# Patient Record
Sex: MALE | Race: WHITE | NOT HISPANIC OR LATINO | Employment: FULL TIME | URBAN - METROPOLITAN AREA
[De-identification: names, ages, dates, MRNs, and addresses within clinical notes are randomized per-mention and may not be internally consistent; named-entity substitution may affect disease eponyms.]

---

## 2019-01-20 ENCOUNTER — APPOINTMENT (EMERGENCY)
Dept: RADIOLOGY | Facility: HOSPITAL | Age: 59
End: 2019-01-20
Payer: COMMERCIAL

## 2019-01-20 ENCOUNTER — HOSPITAL ENCOUNTER (EMERGENCY)
Facility: HOSPITAL | Age: 59
Discharge: HOME | End: 2019-01-20
Attending: EMERGENCY MEDICINE | Admitting: EMERGENCY MEDICINE
Payer: COMMERCIAL

## 2019-01-20 VITALS
SYSTOLIC BLOOD PRESSURE: 162 MMHG | HEART RATE: 72 BPM | DIASTOLIC BLOOD PRESSURE: 80 MMHG | OXYGEN SATURATION: 96 % | TEMPERATURE: 97.9 F | RESPIRATION RATE: 20 BRPM

## 2019-01-20 DIAGNOSIS — M54.50 ACUTE LOW BACK PAIN: Primary | ICD-10-CM

## 2019-01-20 PROCEDURE — 96372 THER/PROPH/DIAG INJ SC/IM: CPT

## 2019-01-20 PROCEDURE — 72100 X-RAY EXAM L-S SPINE 2/3 VWS: CPT

## 2019-01-20 PROCEDURE — 99283 EMERGENCY DEPT VISIT LOW MDM: CPT

## 2019-01-20 RX ORDER — NAPROXEN 500 MG/1
500 TABLET ORAL EVERY 12 HOURS PRN
Qty: 10 TABLET | Refills: 0 | Status: SHIPPED | OUTPATIENT
Start: 2019-01-20 | End: 2019-01-25

## 2019-01-20 RX ORDER — LIDOCAINE 50 MG/G
1 PATCH TOPICAL DAILY
Qty: 30 PATCH | Refills: 0 | Status: SHIPPED | OUTPATIENT
Start: 2019-01-20

## 2019-01-20 RX ORDER — LIDOCAINE 50 MG/G
1 PATCH TOPICAL ONCE
Status: DISCONTINUED | OUTPATIENT
Start: 2019-01-20 | End: 2019-01-20 | Stop reason: HOSPADM

## 2019-01-20 RX ORDER — KETOROLAC TROMETHAMINE 30 MG/ML
30 INJECTION, SOLUTION INTRAMUSCULAR; INTRAVENOUS ONCE
Status: COMPLETED | OUTPATIENT
Start: 2019-01-20 | End: 2019-01-20

## 2019-01-20 RX ORDER — METHOCARBAMOL 500 MG/1
500 TABLET, FILM COATED ORAL 2 TIMES DAILY PRN
Qty: 10 TABLET | Refills: 0 | Status: SHIPPED | OUTPATIENT
Start: 2019-01-20

## 2019-01-20 RX ADMIN — LIDOCAINE 1 PATCH: 50 PATCH TOPICAL at 13:10

## 2019-01-20 RX ADMIN — KETOROLAC TROMETHAMINE 30 MG: 30 INJECTION, SOLUTION INTRAMUSCULAR at 13:09

## 2019-01-20 NOTE — ED PROVIDER NOTES
History  Chief Complaint   Patient presents with    Back Pain     Pt  presents to the ED with complaints of right sided lower back pain that became severe this am  Pt  stated that he awoke Monday with back pain but was able to tolerate it and continue to work  Pain has since worsened  Pt  last took 2 ibuprofen this am at approximately 6 am       Tianna Clark is a 62 y o  male who presents to the ED with complaints of right-sided lower back pain x 6 days  Patient describes the pain as intermittent, throbbing, spasm which is worsened with bending motions  Patient states today he went to bend over and nearly fell to his knees due to pain  Patient states he did take Tylenol prior to arrival without relief  Patient states he has been applying topical medications without relief  Patient does not currently have a primary care provider  Denies injury, fall, numbness, tingling, erythema, edema, urinary bowel incontinence, saddle anesthesia, chest pain, shortness breath, urinary frequency, urinary urgency, hematuria, dysuria, abdominal pain, nausea, vomiting, diarrhea, fever, chills  Denies previous injury or surgery of the back  History provided by:  Patient and spouse  Back Pain   Location:  Lumbar spine  Quality:  Stiffness and stabbing  Radiates to:  Does not radiate  Pain severity:  Moderate  Duration:  6 days  Timing:  Intermittent  Progression:  Worsening  Chronicity:  New  Ineffective treatments:  OTC medications, heating pad and cold packs  Associated symptoms: no abdominal pain, no abdominal swelling, no bladder incontinence, no bowel incontinence, no chest pain, no dysuria, no fever, no headaches, no leg pain, no numbness, no paresthesias, no pelvic pain, no perianal numbness, no tingling, no weakness and no weight loss    Risk factors: no hx of cancer, no recent surgery and no steroid use        None       History reviewed  No pertinent past medical history  History reviewed   No pertinent surgical history  History reviewed  No pertinent family history  I have reviewed and agree with the history as documented  Social History   Substance Use Topics    Smoking status: Current Every Day Smoker     Packs/day: 1 00    Smokeless tobacco: Never Used    Alcohol use No        Review of Systems   Constitutional: Negative for appetite change, chills, fever, unexpected weight change and weight loss  HENT: Negative for congestion, drooling, ear pain, rhinorrhea, sore throat, trouble swallowing and voice change  Eyes: Negative for pain, discharge, redness and visual disturbance  Respiratory: Negative for cough, shortness of breath, wheezing and stridor  Cardiovascular: Negative for chest pain, palpitations and leg swelling  Gastrointestinal: Negative for abdominal pain, blood in stool, bowel incontinence, constipation, diarrhea, nausea and vomiting  Genitourinary: Negative for bladder incontinence, dysuria, flank pain, frequency, hematuria, pelvic pain and urgency  Musculoskeletal: Positive for back pain  Negative for gait problem, joint swelling, neck pain and neck stiffness  Skin: Negative for color change and rash  Neurological: Negative for dizziness, tingling, seizures, weakness, light-headedness, numbness, headaches and paresthesias  Physical Exam  Physical Exam   Constitutional: He is oriented to person, place, and time  Vital signs are normal  He appears well-developed and well-nourished  He is cooperative  Non-toxic appearance  No distress  HENT:   Head: Normocephalic and atraumatic  Right Ear: Hearing, tympanic membrane, external ear and ear canal normal    Left Ear: Hearing, tympanic membrane, external ear and ear canal normal    Nose: Nose normal    Mouth/Throat: Uvula is midline, oropharynx is clear and moist and mucous membranes are normal    Eyes: Pupils are equal, round, and reactive to light   Conjunctivae, EOM and lids are normal    Neck: Trachea normal, normal range of motion, full passive range of motion without pain and phonation normal  Neck supple  Cardiovascular: Normal rate, regular rhythm, intact distal pulses and normal pulses  Pulmonary/Chest: Effort normal and breath sounds normal    Abdominal: Soft  Bowel sounds are normal  There is no tenderness  Musculoskeletal:        Lumbar back: He exhibits decreased range of motion, tenderness and spasm  Back:    Tenderness to palpation along the right SI joint, spasm noted, no erythema or edema  There is mild tenderness palpation the right paraspinal muscles  Posture is upright, and gait is smooth and normal  Spinous processes of lumbar spine palpable, midline, and non-tender  No step-offs  Flexion of the lumbar spine causes mild discomfort  SLR negative b/l  NVI  Neurological: He is alert and oriented to person, place, and time  He has normal strength and normal reflexes  No cranial nerve deficit or sensory deficit  GCS eye subscore is 4  GCS verbal subscore is 5  GCS motor subscore is 6  No saddle anesthesia   Skin: Skin is warm and dry  Capillary refill takes less than 2 seconds  Nursing note and vitals reviewed        Vital Signs  ED Triage Vitals [01/20/19 1230]   Temperature Pulse Respirations Blood Pressure SpO2   97 9 °F (36 6 °C) 72 20 162/80 96 %      Temp Source Heart Rate Source Patient Position - Orthostatic VS BP Location FiO2 (%)   Oral Monitor Sitting Right arm --      Pain Score       8           Vitals:    01/20/19 1230   BP: 162/80   Pulse: 72   Patient Position - Orthostatic VS: Sitting       Visual Acuity      ED Medications  Medications   lidocaine (LIDODERM) 5 % patch 1 patch (1 patch Topical Medication Applied 1/20/19 1310)   ketorolac (TORADOL) injection 30 mg (30 mg Intramuscular Given 1/20/19 1309)       Diagnostic Studies  Results Reviewed     None                 XR lumbar spine 2 or 3 views   ED Interpretation by Jeimy Guzman PA-C (01/20 1317)   No fracture or dislocation  Significant arthritis       Final Result by Matias López MD (01/20 1501)      Degenerative change         Workstation performed: [id number]                    Procedures  Procedures       Phone Contacts  ED Phone Contact    ED Course  ED Course as of Jan 20 1507   Sun Jan 20, 2019   1324 Educated patient regarding diagnosis and management  Advised patient to follow up with PCP  Advised patient to RTER for persistent or worsening symptoms  MDM  Number of Diagnoses or Management Options  Acute low back pain: new and does not require workup  Diagnosis management comments: Gladys Johnson is a 62 y o  male who presents to the ED with complaints of right-sided lower back pain x 6 days  Patient describes the pain as intermittent, throbbing, spasm which is worsened with bending motions  Patient states today he went to bend over and nearly fell to his knees due to pain  Patient states he did take Tylenol prior to arrival without relief  Patient states he has been applying topical medications without relief  Patient does not currently have a primary care provider  Denies injury, fall, numbness, tingling, erythema, edema, urinary bowel incontinence, saddle anesthesia, chest pain, shortness breath, urinary frequency, urinary urgency, hematuria, dysuria, abdominal pain, nausea, vomiting, diarrhea, fever, chills  Denies previous injury or surgery of the back  On physical eamination, there is tenderness to palpation along the right SI joint, spasm noted, no erythema or edema, mild tenderness palpation the right paraspinal muscles  X-ray of lumbar spine significant for degenerative changes, no fracture subluxation, no dislocation  Pain improved with IM Toradol and lidocaine patch  I provided patient with strict RTER precautions  I advised patient follow-up with PCP in 24-48 hours  Patient verbalized understanding       Patient Progress  Patient progress: improved    CritCare Time    Disposition  Final diagnoses:   Acute low back pain     Time reflects when diagnosis was documented in both MDM as applicable and the Disposition within this note     Time User Action Codes Description Comment    1/20/2019  1:23 PM Javier Jono [M54 5] Acute low back pain       ED Disposition     ED Disposition Condition Comment    Discharge  [facility] discharge to home/self care  Condition at discharge: Good        Follow-up Information     Follow up With Specialties Details Why [address] Emergency Department Emergency Medicine Go to If symptoms worsen [address]  [phone] [facility], [address], [place], [place], [address]          Discharge Medication List as of 1/20/2019  1:24 PM      START taking these medications    Details   lidocaine (LIDODERM) 5 % Apply 1 patch topically daily Remove & Discard patch within 12 hours or as directed by MD, Starting Sun 1/20/2019, Print      methocarbamol (ROBAXIN) 500 mg tablet Take 1 tablet (500 mg total) by mouth 2 (two) times a day as needed for muscle spasms, Starting Sun 1/20/2019, Print      naproxen (NAPROSYN) 500 mg tablet Take 1 tablet (500 mg total) by mouth every 12 (twelve) hours as needed for mild pain or moderate pain for up to 5 days, Starting Sun 1/20/2019, Until Fri 1/25/2019, Print           No discharge procedures on file      ED Provider  Electronically Signed by           Rachel Stark PA-C  01/20/19 8027

## 2019-01-20 NOTE — DISCHARGE INSTRUCTIONS
Call [employer] at  [phone]) to obtain a primary care physician  They will be able to schedule you with a physician who sees patients with your insurance and physicians who see patients without insurance  Acute Low Back Pain   WHAT YOU NEED TO KNOW:   Acute low back pain is sudden discomfort in your lower back area that lasts for up to 6 weeks  The discomfort makes it difficult to tolerate activity  DISCHARGE INSTRUCTIONS:   Return to the emergency department if:   · You have severe pain  · You have sudden stiffness and heaviness on both buttocks down to both legs  · You have numbness or weakness in one leg, or pain in both legs  · You have numbness in your genital area or across your lower back  · You cannot control your urine or bowel movements  Contact your healthcare provider if:   · You have a fever  · You have pain at night or when you rest     · Your pain does not get better with treatment  · You have pain that worsens when you cough or sneeze  · You suddenly feel something pop or snap in your back  · You have questions or concerns about your condition or care  Medicines: The following medicines may be ordered by your healthcare provider:  · Acetaminophen  decreases pain  It is available without a doctor's order  Ask how much to take and how often to take it  Follow directions  Acetaminophen can cause liver damage if not taken correctly  · NSAIDs  help decrease swelling and pain  This medicine is available with or without a doctor's order  NSAIDs can cause stomach bleeding or kidney problems in certain people  If you take blood thinner medicine, always ask your healthcare provider if NSAIDs are safe for you  Always read the medicine label and follow directions  · Prescription pain medicine  may be given  Ask your healthcare provider how to take this medicine safely      · Muscle relaxers  decrease pain by relaxing the muscles in your lower spine     · Take your medicine as directed  Contact your healthcare provider if you think your medicine is not helping or if you have side effects  Tell him of her if you are allergic to any medicine  Keep a list of the medicines, vitamins, and herbs you take  Include the amounts, and when and why you take them  Bring the list or the pill bottles to follow-up visits  Carry your medicine list with you in case of an emergency  Self-care:   · Stay active  as much as you can without causing more pain  Bed rest could make your back pain worse  Start with some light exercises such as walking  Avoid heavy lifting until your pain is gone  Ask for more information about the activities or exercises that are right for you  · Ice  helps decrease swelling, pain, and muscle spams  Put crushed ice in a plastic bag  Cover it with a towel  Place it on your lower back for 20 to 30 minutes every 2 hours  Do this for about 2 to 3 days after your pain starts, or as directed  · Heat  helps decrease pain and muscle spasms  Start to use heat after treatment with ice has stopped  Use a small towel dampened with warm water or a heating pad, or sit in a warm bath  Apply heat on the area for 20 to 30 minutes every 2 hours for as many days as directed  Alternate heat and ice  Prevent acute low back pain:   · Use proper body mechanics  ¨ Bend at the hips and knees when you  objects  Do not bend from the waist  Use your leg muscles as you lift the load  Do not use your back  Keep the object close to your chest as you lift it  Try not to twist or lift anything above your waist     ¨ Change your position often when you stand for long periods of time  Rest one foot on a small box or footrest, and then switch to the other foot often  ¨ Try not to sit for long periods of time  When you do, sit in a straight-backed chair with your feet flat on the floor  Never reach, pull, or push while you are sitting      · Do exercises that strengthen your back muscles  Warm up before you exercise  Ask your healthcare provider the best exercises for you  · Maintain a healthy weight  Ask your healthcare provider how much you should weigh  Ask him to help you create a weight loss plan if you are overweight  Follow up with your healthcare provider as directed:  Return for a follow-up visit if you still have pain after 1 to 3 weeks of treatment  You may need to visit an orthopedist if your back pain lasts more than 12 weeks  Write down your questions so you remember to ask them during your visits  © 2017 [facility] Information is for End User's use only and may not be sold, redistributed or otherwise used for commercial purposes  All illustrations and images included in CareNotes® are the copyrighted property of [employer] Inc  or Nayan Brunilda  The above information is an  only  It is not intended as medical advice for individual conditions or treatments  Talk to your doctor, nurse or pharmacist before following any medical regimen to see if it is safe and effective for you  Muscle Spasm   WHAT YOU NEED TO KNOW:   A muscle spasm is a sudden contraction of any muscle or group of muscles  A muscle cramp is a painful muscle spasm  Muscle cramps commonly occur after intense exercise or during pregnancy  They may also be caused by certain medications, dehydration, low calcium or magnesium levels, or another medical condition  DISCHARGE INSTRUCTIONS:   Medicines: You may need the following:  · NSAIDs  help decrease swelling and pain or fever  This medicine is available with or without a doctor's order  NSAIDs can cause stomach bleeding or kidney problems in certain people  If you take blood thinner medicine, always ask your healthcare provider if NSAIDs are safe for you  Always read the medicine label and follow directions  · Take your medicine as directed    Contact your healthcare provider if you think your medicine is not helping or if you have side effects  Tell him of her if you are allergic to any medicine  Keep a list of the medicines, vitamins, and herbs you take  Include the amounts, and when and why you take them  Bring the list or the pill bottles to follow-up visits  Carry your medicine list with you in case of an emergency  Follow up with your healthcare provider as directed: You may need other tests or treatment  You may also be referred to a physical therapist or other specialist  Write down your questions so you remember to ask them during your visits  Self-care:   · Stretch  your muscle to help relieve the cramp  It may be helpful to keep your muscle in the stretched position until the cramp is gone  · Apply heat  to help decrease pain and muscle spasms  Apply heat on the area for 20 to 30 minutes every 2 hours for as many days as directed  · Apply ice  to help decrease swelling and pain  Ice may also help prevent tissue damage  Use an ice pack, or put crushed ice in a plastic bag  Cover it with a towel and place it on your muscle for 15 to 20 minutes every hour or as directed  · Drink more liquids  to help prevent muscle cramps caused by dehydration  Sports drinks may help replace electrolytes you lose through sweat during exercise  Ask your healthcare provider how much liquid to drink each day and which liquids are best for you  · Eat healthy foods , such as fruits, vegetables, whole grains, low-fat dairy products, and lean proteins (meat, beans, and fish)  If you are pregnant, ask your healthcare provider about foods that are high in magnesium and sodium  They may help to relieve cramps during pregnancy  · Massage your muscle  to help relieve the cramp  · Take frequent deep breaths  until the cramp feels better  Lie down while you take the deep breaths so you do not get dizzy or lightheaded    Contact your healthcare provider if:   · You have signs of dehydration, such as a headache, dark yellow urine, dry eyes or mouth, or a fast heartbeat  · You have questions or concerns about your condition or care  Return to the emergency department if:   · You have warmth, swelling, or redness in the cramping muscle  · You have frequent or unrelieved muscle cramps in several different muscles  · You have muscle cramps with numbness, tingling, and burning in your hands and feet  © 2017 [facility] [facility] Information is for End User's use only and may not be sold, redistributed or otherwise used for commercial purposes  All illustrations and images included in CareNotes® are the copyrighted property of A D A M , Inc  or Nayan Worley  The above information is an  only  It is not intended as medical advice for individual conditions or treatments  Talk to your doctor, nurse or pharmacist before following any medical regimen to see if it is safe and effective for you

## 2019-02-04 ENCOUNTER — OFFICE VISIT (OUTPATIENT)
Dept: PODIATRY | Facility: CLINIC | Age: 59
End: 2019-02-04
Payer: COMMERCIAL

## 2019-02-04 VITALS
HEART RATE: 86 BPM | RESPIRATION RATE: 16 BRPM | BODY MASS INDEX: 21.48 KG/M2 | WEIGHT: 145 LBS | DIASTOLIC BLOOD PRESSURE: 93 MMHG | HEIGHT: 69 IN | SYSTOLIC BLOOD PRESSURE: 151 MMHG

## 2019-02-04 DIAGNOSIS — Q66.52 CONGENITAL PES PLANUS OF LEFT FOOT: ICD-10-CM

## 2019-02-04 DIAGNOSIS — M79.671 PAIN IN BOTH FEET: ICD-10-CM

## 2019-02-04 DIAGNOSIS — M79.672 PAIN IN BOTH FEET: ICD-10-CM

## 2019-02-04 DIAGNOSIS — Q66.51 CONGENITAL PES PLANUS OF RIGHT FOOT: ICD-10-CM

## 2019-02-04 DIAGNOSIS — M72.2 PLANTAR FASCIITIS: Primary | ICD-10-CM

## 2019-02-04 PROCEDURE — L3000 FT INSERT UCB BERKELEY SHELL: HCPCS | Performed by: PODIATRIST

## 2019-02-04 PROCEDURE — 99203 OFFICE O/P NEW LOW 30 MIN: CPT | Performed by: PODIATRIST

## 2019-02-04 PROCEDURE — 73620 X-RAY EXAM OF FOOT: CPT | Performed by: PODIATRIST

## 2019-02-04 RX ORDER — MELOXICAM 7.5 MG/1
7.5 TABLET ORAL DAILY
Qty: 10 TABLET | Refills: 0 | Status: SHIPPED | OUTPATIENT
Start: 2019-02-04 | End: 2019-02-14

## 2019-02-04 NOTE — PROGRESS NOTES
Assessment/Plan:  Bilateral plantar fasciitis  Pes planus  Pain upon ambulation  Plan  Foot exam performed  X-rays taken  Patient's feet casted for custom molded foot orthotics  He will stretch daily  He will use oral anti-inflammatory medicine  No problem-specific Assessment & Plan notes found for this encounter  There are no diagnoses linked to this encounter  Subjective:  Patient has pain in his feet with ambulation  He has pain in his heels  This occurs with rising  No history of trauma  It has been ongoing for months  No past medical history on file  No past surgical history on file  No Known Allergies      Current Outpatient Prescriptions:     lidocaine (LIDODERM) 5 %, Apply 1 patch topically daily Remove & Discard patch within 12 hours or as directed by MD (Patient not taking: Reported on 2/4/2019 ), Disp: 30 patch, Rfl: 0    methocarbamol (ROBAXIN) 500 mg tablet, Take 1 tablet (500 mg total) by mouth 2 (two) times a day as needed for muscle spasms (Patient not taking: Reported on 2/4/2019 ), Disp: 10 tablet, Rfl: 0    naproxen (NAPROSYN) 500 mg tablet, Take 1 tablet (500 mg total) by mouth every 12 (twelve) hours as needed for mild pain or moderate pain for up to 5 days, Disp: 10 tablet, Rfl: 0    There is no problem list on file for this patient  Patient ID: Sheri Velázquez is a 62 y o  male  HPI    The following portions of the patient's history were reviewed and updated as appropriate: allergies, current medications, past family history, past medical history, past social history, past surgical history and problem list     Review of Systems      Objective:  Patient's shoes and socks removed     Foot Exam    General  General Appearance: appears stated age and healthy   Orientation: alert and oriented to person, place, and time   Affect: appropriate   Gait: unimpaired       Right Foot/Ankle     Inspection and Palpation  Ecchymosis: none  Tenderness: calcaneus tenderness, bony tenderness and plantar fascia   Swelling: plantar fascia   Arch: pes planus  Hammertoes: fifth toe  Hallux valgus: no  Hallux limitus: yes    Neurovascular  Dorsalis pedis: 3+  Posterior tibial: 3+  Saphenous nerve sensation: normal  Tibial nerve sensation: normal  Superficial peroneal nerve sensation: normal  Deep peroneal nerve sensation: normal  Sural nerve sensation: normal  Achilles reflex: 2+  Babinski reflex: 2+    Muscle Strength  Ankle dorsiflexion: 5  Ankle plantar flexion: 5  Ankle inversion: 5  Ankle eversion: 5  Great toe extension: 5  Great toe flexion: 5      Left Foot/Ankle      Inspection and Palpation  Ecchymosis: none  Tenderness: bony tenderness, plantar fascia and calcaneus tenderness   Swelling: plantar fascia   Arch: pes planus  Hammertoes: fifth toe  Hallux valgus: no  Hallux limitus: yes    Neurovascular  Dorsalis pedis: 3+  Posterior tibial: 3+  Saphenous nerve sensation: normal  Tibial nerve sensation: normal  Superficial peroneal nerve sensation: normal  Deep peroneal nerve sensation: normal  Sural nerve sensation: normal  Achilles reflex: 2+  Babinski reflex: 2+    Muscle Strength  Ankle dorsiflexion: 5  Ankle plantar flexion: 5  Ankle inversion: 5  Ankle eversion: 5  Great toe extension: 5  Great toe flexion: 5        Physical Exam   Constitutional: He appears well-developed and well-nourished  Cardiovascular: Normal rate and regular rhythm  Pulses:       Dorsalis pedis pulses are 3+ on the right side, and 3+ on the left side  Posterior tibial pulses are 3+ on the right side, and 3+ on the left side  Musculoskeletal:        Right foot: There is bony tenderness  Left foot: There is bony tenderness  Patient is pronated in stance and gait    Pain with palpation plantar fascia insertion bilateral     X-ray demonstrates early plantar calcaneal spurring   Neurological:   Reflex Scores:       Achilles reflexes are 2+ on the right side and 2+ on the left side  Vitals reviewed

## 2023-10-26 ENCOUNTER — OFFICE VISIT (OUTPATIENT)
Dept: FAMILY MEDICINE CLINIC | Facility: CLINIC | Age: 63
End: 2023-10-26

## 2023-10-26 VITALS
TEMPERATURE: 97.4 F | BODY MASS INDEX: 21.73 KG/M2 | DIASTOLIC BLOOD PRESSURE: 68 MMHG | SYSTOLIC BLOOD PRESSURE: 120 MMHG | HEIGHT: 68 IN | OXYGEN SATURATION: 98 % | WEIGHT: 143.4 LBS | HEART RATE: 81 BPM

## 2023-10-26 DIAGNOSIS — F17.200 CURRENT EVERY DAY SMOKER: ICD-10-CM

## 2023-10-26 DIAGNOSIS — J40 BRONCHITIS: Primary | ICD-10-CM

## 2023-10-26 PROCEDURE — 99203 OFFICE O/P NEW LOW 30 MIN: CPT | Performed by: INTERNAL MEDICINE

## 2023-10-26 RX ORDER — METHYLPREDNISOLONE 4 MG/1
TABLET ORAL
Qty: 21 EACH | Refills: 0 | Status: SHIPPED | OUTPATIENT
Start: 2023-10-26 | End: 2023-10-26 | Stop reason: SDUPTHER

## 2023-10-26 RX ORDER — AMOXICILLIN 500 MG/1
500 CAPSULE ORAL EVERY 8 HOURS SCHEDULED
Qty: 30 CAPSULE | Refills: 0 | Status: SHIPPED | OUTPATIENT
Start: 2023-10-26 | End: 2023-11-05

## 2023-10-26 RX ORDER — ALBUTEROL SULFATE 90 UG/1
2 AEROSOL, METERED RESPIRATORY (INHALATION) EVERY 6 HOURS PRN
Qty: 6.7 G | Refills: 5 | Status: SHIPPED | OUTPATIENT
Start: 2023-10-26

## 2023-10-26 RX ORDER — AMOXICILLIN 500 MG/1
500 CAPSULE ORAL EVERY 8 HOURS SCHEDULED
Qty: 30 CAPSULE | Refills: 0 | Status: SHIPPED | OUTPATIENT
Start: 2023-10-26 | End: 2023-10-26 | Stop reason: SDUPTHER

## 2023-10-26 RX ORDER — METHYLPREDNISOLONE 4 MG/1
TABLET ORAL
Qty: 21 EACH | Refills: 0 | Status: SHIPPED | OUTPATIENT
Start: 2023-10-26

## 2023-10-26 NOTE — PROGRESS NOTES
Name: Christopher Oliva      : 1960      MRN: [id number]  Encounter Provider: Randy Fink MD  Encounter Date: 10/26/2023   Encounter department: [facility]     1. Bronchitis  -     albuterol (Proventil HFA) 90 mcg/act inhaler; Inhale 2 puffs every 6 (six) hours as needed for wheezing  -     amoxicillin (AMOXIL) 500 mg capsule; Take 1 capsule (500 mg total) by mouth every 8 (eight) hours for 10 days  -     methylPREDNISolone 4 MG tablet therapy pack; Use as directed on package    2. Current every day smoker        Depression Screening and Follow-up Plan: Patient was screened for depression during today's encounter. They screened negative with a PHQ-2 score of 0. Tobacco Cessation Counseling: Tobacco cessation counseling was provided. The patient is sincerely urged to quit consumption of tobacco. He is ready to quit tobacco.         Subjective      Cough  This is a new problem. The current episode started in the past 7 days. The problem has been gradually worsening. The problem occurs every few minutes. The cough is Productive of purulent sputum. Associated symptoms include chest pain, nasal congestion, postnasal drip, shortness of breath (mild) and wheezing (Only on forced expiration). Pertinent negatives include no chills, ear pain, fever, rash, sore throat or weight loss. The symptoms are aggravated by dust and lying down. Risk factors for lung disease include smoking/tobacco exposure. He has tried cool air, rest and OTC cough suppressant for the symptoms. The treatment provided no relief. His past medical history is significant for asthma. There is no history of bronchitis or COPD. Chest Pain   Associated symptoms include a cough and shortness of breath (mild). Pertinent negatives include no abdominal pain, back pain, fever, palpitations or vomiting. Pertinent negatives for past medical history include no seizures.      Review of Systems   Constitutional:  Negative for chills, fever and weight loss. HENT:  Positive for congestion and postnasal drip. Negative for ear pain and sore throat. Eyes:  Negative for pain and visual disturbance. Respiratory:  Positive for cough, chest tightness, shortness of breath (mild) and wheezing (Only on forced expiration). Cardiovascular:  Positive for chest pain. Negative for palpitations. Gastrointestinal:  Negative for abdominal pain and vomiting. Genitourinary:  Negative for dysuria and hematuria. Musculoskeletal:  Negative for arthralgias and back pain. Skin:  Negative for color change and rash. Neurological:  Negative for seizures and syncope. Hematological:  Does not bruise/bleed easily. Psychiatric/Behavioral:  Negative for agitation and decreased concentration. The patient is not hyperactive. All other systems reviewed and are negative. Current Outpatient Medications on File Prior to Visit   Medication Sig    [DISCONTINUED] lidocaine (LIDODERM) 5 % Apply 1 patch topically daily Remove & Discard patch within 12 hours or as directed by MD (Patient not taking: Reported on 2/4/2019 )    [DISCONTINUED] meloxicam (MOBIC) 7.5 mg tablet Take 1 tablet (7.5 mg total) by mouth daily for 10 days    [DISCONTINUED] methocarbamol (ROBAXIN) 500 mg tablet Take 1 tablet (500 mg total) by mouth 2 (two) times a day as needed for muscle spasms (Patient not taking: Reported on 2/4/2019 )    [DISCONTINUED] naproxen (NAPROSYN) 500 mg tablet Take 1 tablet (500 mg total) by mouth every 12 (twelve) hours as needed for mild pain or moderate pain for up to 5 days       Objective     /68 (BP Location: Right arm, Patient Position: Sitting, Cuff Size: Standard)   Pulse 81   Temp (!) 97.4 °F (36.3 °C) (Temporal)   Ht 5' 8" (1.727 m)   Wt 65 kg (143 lb 6.4 oz)   SpO2 98%   BMI 21.80 kg/m²     Physical Exam  Constitutional:       Appearance: He is normal weight. He is not toxic-appearing or diaphoretic.    Eyes:      Extraocular Movements: Extraocular movements intact. Pupils: Pupils are equal, round, and reactive to light. Neck:      Vascular: No JVD. Cardiovascular:      Rate and Rhythm: Normal rate. Heart sounds: Normal heart sounds. Pulmonary:      Effort: No tachypnea or accessory muscle usage. Breath sounds: Examination of the right-lower field reveals rhonchi. Examination of the left-lower field reveals rhonchi. Decreased breath sounds and rhonchi present. No wheezing or rales. Musculoskeletal:         General: Normal range of motion. Right lower leg: No edema. Left lower leg: No edema. Lymphadenopathy:      Cervical: No cervical adenopathy. Skin:     Capillary Refill: Capillary refill takes less than 2 seconds. Coloration: Skin is not cyanotic. Nails: There is no clubbing. Neurological:      General: No focal deficit present. Mental Status: He is alert and oriented to person, place, and time.    Psychiatric:         Mood and Affect: Mood normal.         Behavior: Behavior normal.       Corrinne Balloon, MD

## 2023-10-26 NOTE — ASSESSMENT & PLAN NOTE
Patient has had URI symptoms for several weeks with productive cough and mild shortness of breath.   We will treat with amoxicillin and Medrol Dosepak gave him a written prescription for Proventil inhaler if he needs it

## 2023-12-27 ENCOUNTER — TELEPHONE (OUTPATIENT)
Dept: FAMILY MEDICINE CLINIC | Facility: CLINIC | Age: 63
End: 2023-12-27

## 2023-12-27 NOTE — TELEPHONE ENCOUNTER
Patient experiencing, R shoulder pain, radiates to the front chest. No decreased range of motion or strength. Patient would like to know if you can place a referral to see orthopedics. Patient also still has a deep cough, painful to cough, thinks may have pulled a muscle when diagnosed with bronchitis. Maybe get a chest xray?

## 2024-01-01 ENCOUNTER — APPOINTMENT (INPATIENT)
Dept: RADIOLOGY | Facility: HOSPITAL | Age: 64
DRG: 180 | End: 2024-01-01
Payer: COMMERCIAL

## 2024-01-01 ENCOUNTER — TELEPHONE (OUTPATIENT)
Dept: SURGICAL ONCOLOGY | Facility: CLINIC | Age: 64
End: 2024-01-01

## 2024-01-01 ENCOUNTER — APPOINTMENT (INPATIENT)
Dept: MRI IMAGING | Facility: HOSPITAL | Age: 64
DRG: 180 | End: 2024-01-01
Payer: COMMERCIAL

## 2024-01-01 ENCOUNTER — APPOINTMENT (OUTPATIENT)
Dept: SURGERY | Facility: HOSPITAL | Age: 64
DRG: 180 | End: 2024-01-01
Attending: INTERNAL MEDICINE
Payer: COMMERCIAL

## 2024-01-01 ENCOUNTER — HOSPITAL ENCOUNTER (INPATIENT)
Facility: HOSPITAL | Age: 64
LOS: 13 days | DRG: 180 | End: 2024-02-20
Attending: STUDENT IN AN ORGANIZED HEALTH CARE EDUCATION/TRAINING PROGRAM | Admitting: INTERNAL MEDICINE
Payer: COMMERCIAL

## 2024-01-01 ENCOUNTER — APPOINTMENT (EMERGENCY)
Dept: CT IMAGING | Facility: HOSPITAL | Age: 64
DRG: 180 | End: 2024-01-01
Payer: COMMERCIAL

## 2024-01-01 ENCOUNTER — APPOINTMENT (INPATIENT)
Dept: NON INVASIVE DIAGNOSTICS | Facility: HOSPITAL | Age: 64
DRG: 180 | End: 2024-01-01
Payer: COMMERCIAL

## 2024-01-01 ENCOUNTER — APPOINTMENT (INPATIENT)
Dept: VASCULAR ULTRASOUND | Facility: HOSPITAL | Age: 64
DRG: 180 | End: 2024-01-01
Payer: COMMERCIAL

## 2024-01-01 VITALS
RESPIRATION RATE: 27 BRPM | HEART RATE: 107 BPM | DIASTOLIC BLOOD PRESSURE: 59 MMHG | WEIGHT: 125.66 LBS | TEMPERATURE: 97.9 F | SYSTOLIC BLOOD PRESSURE: 97 MMHG | HEIGHT: 69 IN | BODY MASS INDEX: 18.61 KG/M2 | OXYGEN SATURATION: 96 %

## 2024-01-01 DIAGNOSIS — C79.9 METASTATIC DISEASE (HCC): ICD-10-CM

## 2024-01-01 DIAGNOSIS — J91.0 MALIGNANT PLEURAL EFFUSION: ICD-10-CM

## 2024-01-01 DIAGNOSIS — R09.02 HYPOXIA: Primary | ICD-10-CM

## 2024-01-01 DIAGNOSIS — I82.623 ACUTE DEEP VEIN THROMBOSIS (DVT) OF OTHER VEIN OF BOTH UPPER EXTREMITIES (HCC): ICD-10-CM

## 2024-01-01 DIAGNOSIS — J96.01 ACUTE RESPIRATORY FAILURE WITH HYPOXIA (HCC): ICD-10-CM

## 2024-01-01 DIAGNOSIS — G89.3 CANCER RELATED PAIN: ICD-10-CM

## 2024-01-01 DIAGNOSIS — J90 PLEURAL EFFUSION ON RIGHT: ICD-10-CM

## 2024-01-01 DIAGNOSIS — C34.91 ADENOCARCINOMA OF RIGHT LUNG (HCC): ICD-10-CM

## 2024-01-01 DIAGNOSIS — R52 INTRACTABLE PAIN: ICD-10-CM

## 2024-01-01 DIAGNOSIS — J90 PLEURAL EFFUSION: ICD-10-CM

## 2024-01-01 DIAGNOSIS — C34.90 ADENOCARCINOMA OF LUNG (HCC): ICD-10-CM

## 2024-01-01 LAB
2HR DELTA HS TROPONIN: 1 NG/L
ALBUMIN SERPL BCP-MCNC: 3.1 G/DL (ref 3.5–5)
ALBUMIN SERPL BCP-MCNC: 3.8 G/DL (ref 3.5–5)
ALP SERPL-CCNC: 104 U/L (ref 34–104)
ALP SERPL-CCNC: 84 U/L (ref 34–104)
ALT SERPL W P-5'-P-CCNC: 16 U/L (ref 7–52)
ALT SERPL W P-5'-P-CCNC: 46 U/L (ref 7–52)
ANION GAP SERPL CALCULATED.3IONS-SCNC: 10 MMOL/L
ANION GAP SERPL CALCULATED.3IONS-SCNC: 11 MMOL/L
ANION GAP SERPL CALCULATED.3IONS-SCNC: 9 MMOL/L
ANISOCYTOSIS BLD QL SMEAR: PRESENT
AORTIC ROOT: 3.4 CM
APICAL FOUR CHAMBER EJECTION FRACTION: 62 %
APPEARANCE FLD: ABNORMAL
APTT PPP: 103 SECONDS (ref 23–37)
APTT PPP: 179 SECONDS (ref 23–37)
APTT PPP: 193 SECONDS (ref 23–37)
APTT PPP: 36 SECONDS (ref 23–37)
APTT PPP: 36 SECONDS (ref 23–37)
APTT PPP: 39 SECONDS (ref 23–37)
APTT PPP: 40 SECONDS (ref 23–37)
APTT PPP: 49 SECONDS (ref 23–37)
APTT PPP: 56 SECONDS (ref 23–37)
APTT PPP: 59 SECONDS (ref 23–37)
APTT PPP: 59 SECONDS (ref 23–37)
APTT PPP: 64 SECONDS (ref 23–37)
APTT PPP: 64 SECONDS (ref 23–37)
APTT PPP: 65 SECONDS (ref 23–37)
APTT PPP: 68 SECONDS (ref 23–37)
APTT PPP: 82 SECONDS (ref 23–37)
APTT PPP: 82 SECONDS (ref 23–37)
APTT PPP: 86 SECONDS (ref 23–37)
APTT PPP: 93 SECONDS (ref 23–37)
APTT PPP: 95 SECONDS (ref 23–37)
APTT PPP: 95 SECONDS (ref 23–37)
APTT PPP: >210 SECONDS (ref 23–37)
ARTERIAL PATENCY WRIST A: YES
ASCENDING AORTA: 3.4 CM
AST SERPL W P-5'-P-CCNC: 25 U/L (ref 13–39)
AST SERPL W P-5'-P-CCNC: 48 U/L (ref 13–39)
ATRIAL RATE: 103 BPM
BACTERIA SPEC BFLD CULT: NO GROWTH
BASE EXCESS BLDA CALC-SCNC: 6.4 MMOL/L
BASOPHILS # BLD AUTO: 0.02 THOUSANDS/ÂΜL (ref 0–0.1)
BASOPHILS # BLD AUTO: 0.02 THOUSANDS/ÂΜL (ref 0–0.1)
BASOPHILS # BLD AUTO: 0.04 THOUSANDS/ÂΜL (ref 0–0.1)
BASOPHILS # BLD AUTO: 0.04 THOUSANDS/ÂΜL (ref 0–0.1)
BASOPHILS # BLD MANUAL: 0 THOUSAND/UL (ref 0–0.1)
BASOPHILS # BLD MANUAL: 0.03 THOUSAND/UL (ref 0–0.1)
BASOPHILS NFR BLD AUTO: 0 % (ref 0–1)
BASOPHILS NFR BLD AUTO: 0 % (ref 0–1)
BASOPHILS NFR BLD AUTO: 1 % (ref 0–1)
BASOPHILS NFR BLD AUTO: 1 % (ref 0–1)
BASOPHILS NFR MAR MANUAL: 0 % (ref 0–1)
BASOPHILS NFR MAR MANUAL: 1 % (ref 0–1)
BILIRUB SERPL-MCNC: 0.52 MG/DL (ref 0.2–1)
BILIRUB SERPL-MCNC: 0.91 MG/DL (ref 0.2–1)
BNP SERPL-MCNC: 57 PG/ML (ref 0–100)
BSA FOR ECHO PROCEDURE: 1.7 M2
BUN SERPL-MCNC: 12 MG/DL (ref 5–25)
BUN SERPL-MCNC: 19 MG/DL (ref 5–25)
BUN SERPL-MCNC: 22 MG/DL (ref 5–25)
BUN SERPL-MCNC: 24 MG/DL (ref 5–25)
BUN SERPL-MCNC: 26 MG/DL (ref 5–25)
BUN SERPL-MCNC: 27 MG/DL (ref 5–25)
BUN SERPL-MCNC: 28 MG/DL (ref 5–25)
BUN SERPL-MCNC: 31 MG/DL (ref 5–25)
BUN SERPL-MCNC: 32 MG/DL (ref 5–25)
BURR CELLS BLD QL SMEAR: PRESENT
CALCIUM ALBUM COR SERPL-MCNC: 10.4 MG/DL (ref 8.3–10.1)
CALCIUM SERPL-MCNC: 9.1 MG/DL (ref 8.4–10.2)
CALCIUM SERPL-MCNC: 9.4 MG/DL (ref 8.4–10.2)
CALCIUM SERPL-MCNC: 9.5 MG/DL (ref 8.4–10.2)
CALCIUM SERPL-MCNC: 9.5 MG/DL (ref 8.4–10.2)
CALCIUM SERPL-MCNC: 9.6 MG/DL (ref 8.4–10.2)
CALCIUM SERPL-MCNC: 9.6 MG/DL (ref 8.4–10.2)
CALCIUM SERPL-MCNC: 9.7 MG/DL (ref 8.4–10.2)
CARDIAC TROPONIN I PNL SERPL HS: 27 NG/L
CARDIAC TROPONIN I PNL SERPL HS: 28 NG/L
CHLORIDE SERPL-SCNC: 90 MMOL/L (ref 96–108)
CHLORIDE SERPL-SCNC: 91 MMOL/L (ref 96–108)
CHLORIDE SERPL-SCNC: 93 MMOL/L (ref 96–108)
CHLORIDE SERPL-SCNC: 93 MMOL/L (ref 96–108)
CHLORIDE SERPL-SCNC: 94 MMOL/L (ref 96–108)
CO2 SERPL-SCNC: 28 MMOL/L (ref 21–32)
CO2 SERPL-SCNC: 28 MMOL/L (ref 21–32)
CO2 SERPL-SCNC: 30 MMOL/L (ref 21–32)
CO2 SERPL-SCNC: 30 MMOL/L (ref 21–32)
CO2 SERPL-SCNC: 31 MMOL/L (ref 21–32)
CO2 SERPL-SCNC: 33 MMOL/L (ref 21–32)
CO2 SERPL-SCNC: 35 MMOL/L (ref 21–32)
COLOR FLD: YELLOW
CORTIS AM PEAK SERPL-MCNC: 7 UG/DL (ref 6.7–22.6)
CREAT SERPL-MCNC: 0.52 MG/DL (ref 0.6–1.3)
CREAT SERPL-MCNC: 0.52 MG/DL (ref 0.6–1.3)
CREAT SERPL-MCNC: 0.54 MG/DL (ref 0.6–1.3)
CREAT SERPL-MCNC: 0.56 MG/DL (ref 0.6–1.3)
CREAT SERPL-MCNC: 0.56 MG/DL (ref 0.6–1.3)
CREAT SERPL-MCNC: 0.6 MG/DL (ref 0.6–1.3)
CREAT SERPL-MCNC: 0.62 MG/DL (ref 0.6–1.3)
CREAT SERPL-MCNC: 0.62 MG/DL (ref 0.6–1.3)
CREAT SERPL-MCNC: 0.64 MG/DL (ref 0.6–1.3)
DACRYOCYTES BLD QL SMEAR: PRESENT
DME PARACHUTE DELIVERY DATE REQUESTED: NORMAL
DME PARACHUTE ITEM DESCRIPTION: NORMAL
DME PARACHUTE ITEM DESCRIPTION: NORMAL
DME PARACHUTE ORDER STATUS: NORMAL
DME PARACHUTE SUPPLIER NAME: NORMAL
DME PARACHUTE SUPPLIER PHONE: NORMAL
DOHLE BOD BLD QL SMEAR: PRESENT
E WAVE DECELERATION TIME: 194 MS
E/A RATIO: 1.1
EOSINOPHIL # BLD AUTO: 0 THOUSAND/ÂΜL (ref 0–0.61)
EOSINOPHIL # BLD AUTO: 0.03 THOUSAND/ÂΜL (ref 0–0.61)
EOSINOPHIL # BLD AUTO: 0.09 THOUSAND/ÂΜL (ref 0–0.61)
EOSINOPHIL # BLD AUTO: 0.13 THOUSAND/ÂΜL (ref 0–0.61)
EOSINOPHIL # BLD MANUAL: 0 THOUSAND/UL (ref 0–0.4)
EOSINOPHIL # BLD MANUAL: 0.09 THOUSAND/UL (ref 0–0.4)
EOSINOPHIL # BLD MANUAL: 0.18 THOUSAND/UL (ref 0–0.4)
EOSINOPHIL NFR BLD AUTO: 0 % (ref 0–6)
EOSINOPHIL NFR BLD AUTO: 0 % (ref 0–6)
EOSINOPHIL NFR BLD AUTO: 1 % (ref 0–6)
EOSINOPHIL NFR BLD AUTO: 2 % (ref 0–6)
EOSINOPHIL NFR BLD MANUAL: 0 % (ref 0–6)
EOSINOPHIL NFR BLD MANUAL: 2 % (ref 0–6)
EOSINOPHIL NFR BLD MANUAL: 3 % (ref 0–6)
ERYTHROCYTE [DISTWIDTH] IN BLOOD BY AUTOMATED COUNT: 13.5 % (ref 11.6–15.1)
ERYTHROCYTE [DISTWIDTH] IN BLOOD BY AUTOMATED COUNT: 14.1 % (ref 11.6–15.1)
ERYTHROCYTE [DISTWIDTH] IN BLOOD BY AUTOMATED COUNT: 14.3 % (ref 11.6–15.1)
ERYTHROCYTE [DISTWIDTH] IN BLOOD BY AUTOMATED COUNT: 14.6 % (ref 11.6–15.1)
ERYTHROCYTE [DISTWIDTH] IN BLOOD BY AUTOMATED COUNT: 14.8 % (ref 11.6–15.1)
ERYTHROCYTE [DISTWIDTH] IN BLOOD BY AUTOMATED COUNT: 14.9 % (ref 11.6–15.1)
ERYTHROCYTE [DISTWIDTH] IN BLOOD BY AUTOMATED COUNT: 14.9 % (ref 11.6–15.1)
ERYTHROCYTE [DISTWIDTH] IN BLOOD BY AUTOMATED COUNT: 15 % (ref 11.6–15.1)
ERYTHROCYTE [DISTWIDTH] IN BLOOD BY AUTOMATED COUNT: 15.1 % (ref 11.6–15.1)
FRACTIONAL SHORTENING: 24 (ref 28–44)
GFR SERPL CREATININE-BSD FRML MDRD: 104 ML/MIN/1.73SQ M
GFR SERPL CREATININE-BSD FRML MDRD: 105 ML/MIN/1.73SQ M
GFR SERPL CREATININE-BSD FRML MDRD: 105 ML/MIN/1.73SQ M
GFR SERPL CREATININE-BSD FRML MDRD: 107 ML/MIN/1.73SQ M
GFR SERPL CREATININE-BSD FRML MDRD: 110 ML/MIN/1.73SQ M
GFR SERPL CREATININE-BSD FRML MDRD: 110 ML/MIN/1.73SQ M
GFR SERPL CREATININE-BSD FRML MDRD: 111 ML/MIN/1.73SQ M
GFR SERPL CREATININE-BSD FRML MDRD: 113 ML/MIN/1.73SQ M
GFR SERPL CREATININE-BSD FRML MDRD: 113 ML/MIN/1.73SQ M
GLUCOSE FLD-MCNC: 141 MG/DL
GLUCOSE SERPL-MCNC: 103 MG/DL (ref 65–140)
GLUCOSE SERPL-MCNC: 107 MG/DL (ref 65–140)
GLUCOSE SERPL-MCNC: 107 MG/DL (ref 65–140)
GLUCOSE SERPL-MCNC: 119 MG/DL (ref 65–140)
GLUCOSE SERPL-MCNC: 121 MG/DL (ref 65–140)
GLUCOSE SERPL-MCNC: 121 MG/DL (ref 65–140)
GLUCOSE SERPL-MCNC: 127 MG/DL (ref 65–140)
GLUCOSE SERPL-MCNC: 134 MG/DL (ref 65–140)
GLUCOSE SERPL-MCNC: 142 MG/DL (ref 65–140)
GLUCOSE SERPL-MCNC: 186 MG/DL (ref 65–140)
GLUCOSE SERPL-MCNC: 205 MG/DL (ref 65–140)
GRAM STN SPEC: NORMAL
GRAM STN SPEC: NORMAL
HCO3 BLDA-SCNC: 30.7 MMOL/L (ref 22–28)
HCT VFR BLD AUTO: 25 % (ref 36.5–49.3)
HCT VFR BLD AUTO: 26 % (ref 36.5–49.3)
HCT VFR BLD AUTO: 26.5 % (ref 36.5–49.3)
HCT VFR BLD AUTO: 26.6 % (ref 36.5–49.3)
HCT VFR BLD AUTO: 26.9 % (ref 36.5–49.3)
HCT VFR BLD AUTO: 29.7 % (ref 36.5–49.3)
HCT VFR BLD AUTO: 29.8 % (ref 36.5–49.3)
HCT VFR BLD AUTO: 30 % (ref 36.5–49.3)
HCT VFR BLD AUTO: 34.8 % (ref 36.5–49.3)
HGB BLD-MCNC: 12.2 G/DL (ref 12–17)
HGB BLD-MCNC: 8 G/DL (ref 12–17)
HGB BLD-MCNC: 8.6 G/DL (ref 12–17)
HGB BLD-MCNC: 9.7 G/DL (ref 12–17)
HGB BLD-MCNC: 9.8 G/DL (ref 12–17)
HGB BLD-MCNC: 9.8 G/DL (ref 12–17)
HISTIOCYTES NFR FLD: 2 %
IMM GRANULOCYTES # BLD AUTO: 0.1 THOUSAND/UL (ref 0–0.2)
IMM GRANULOCYTES # BLD AUTO: 0.13 THOUSAND/UL (ref 0–0.2)
IMM GRANULOCYTES # BLD AUTO: 0.15 THOUSAND/UL (ref 0–0.2)
IMM GRANULOCYTES # BLD AUTO: 0.22 THOUSAND/UL (ref 0–0.2)
IMM GRANULOCYTES NFR BLD AUTO: 1 % (ref 0–2)
IMM GRANULOCYTES NFR BLD AUTO: 2 % (ref 0–2)
IMM GRANULOCYTES NFR BLD AUTO: 2 % (ref 0–2)
IMM GRANULOCYTES NFR BLD AUTO: 3 % (ref 0–2)
INR PPP: 1.05 (ref 0.84–1.19)
INR PPP: 1.13 (ref 0.84–1.19)
INTERVENTRICULAR SEPTUM IN DIASTOLE (PARASTERNAL SHORT AXIS VIEW): 1 CM
INTERVENTRICULAR SEPTUM: 1 CM (ref 0.6–1.1)
IPAP: 12
LAAS-AP2: 12.3 CM2
LAAS-AP4: 12.2 CM2
LACTATE SERPL-SCNC: 2 MMOL/L (ref 0.5–2)
LDH FLD L TO P-CCNC: 225 U/L
LDH SERPL-CCNC: 709 U/L (ref 140–271)
LEFT ATRIUM SIZE: 2.4 CM
LEFT ATRIUM VOLUME (MOD BIPLANE): 25 ML
LEFT ATRIUM VOLUME INDEX (MOD BIPLANE): 14.7 ML/M2
LEFT INTERNAL DIMENSION IN SYSTOLE: 2.2 CM (ref 2.1–4)
LEFT VENTRICULAR INTERNAL DIMENSION IN DIASTOLE: 2.9 CM (ref 3.5–6)
LEFT VENTRICULAR POSTERIOR WALL IN END DIASTOLE: 1 CM
LEFT VENTRICULAR STROKE VOLUME: 16 ML
LVSV (TEICH): 16 ML
LYMPHOCYTES # BLD AUTO: 0.16 THOUSAND/UL (ref 0.6–4.47)
LYMPHOCYTES # BLD AUTO: 0.3 THOUSANDS/ÂΜL (ref 0.6–4.47)
LYMPHOCYTES # BLD AUTO: 0.36 THOUSANDS/ÂΜL (ref 0.6–4.47)
LYMPHOCYTES # BLD AUTO: 0.5 THOUSAND/UL (ref 0.6–4.47)
LYMPHOCYTES # BLD AUTO: 0.81 THOUSAND/UL (ref 0.6–4.47)
LYMPHOCYTES # BLD AUTO: 1.07 THOUSAND/UL (ref 0.6–4.47)
LYMPHOCYTES # BLD AUTO: 1.16 THOUSANDS/ÂΜL (ref 0.6–4.47)
LYMPHOCYTES # BLD AUTO: 1.49 THOUSANDS/ÂΜL (ref 0.6–4.47)
LYMPHOCYTES # BLD AUTO: 1.58 THOUSAND/UL (ref 0.6–4.47)
LYMPHOCYTES # BLD AUTO: 13 % (ref 14–44)
LYMPHOCYTES # BLD AUTO: 17 % (ref 14–44)
LYMPHOCYTES # BLD AUTO: 18 % (ref 14–44)
LYMPHOCYTES # BLD AUTO: 6 % (ref 14–44)
LYMPHOCYTES # BLD AUTO: 8 % (ref 14–44)
LYMPHOCYTES NFR BLD AUTO: 10 %
LYMPHOCYTES NFR BLD AUTO: 14 % (ref 14–44)
LYMPHOCYTES NFR BLD AUTO: 19 % (ref 14–44)
LYMPHOCYTES NFR BLD AUTO: 4 % (ref 14–44)
LYMPHOCYTES NFR BLD AUTO: 5 % (ref 14–44)
MAGNESIUM SERPL-MCNC: 2.1 MG/DL (ref 1.9–2.7)
MCH RBC QN AUTO: 29.9 PG (ref 26.8–34.3)
MCH RBC QN AUTO: 30.8 PG (ref 26.8–34.3)
MCH RBC QN AUTO: 30.8 PG (ref 26.8–34.3)
MCH RBC QN AUTO: 30.9 PG (ref 26.8–34.3)
MCH RBC QN AUTO: 31 PG (ref 26.8–34.3)
MCH RBC QN AUTO: 31.1 PG (ref 26.8–34.3)
MCH RBC QN AUTO: 31.3 PG (ref 26.8–34.3)
MCH RBC QN AUTO: 31.3 PG (ref 26.8–34.3)
MCH RBC QN AUTO: 31.9 PG (ref 26.8–34.3)
MCHC RBC AUTO-ENTMCNC: 32 G/DL (ref 31.4–37.4)
MCHC RBC AUTO-ENTMCNC: 32 G/DL (ref 31.4–37.4)
MCHC RBC AUTO-ENTMCNC: 32.3 G/DL (ref 31.4–37.4)
MCHC RBC AUTO-ENTMCNC: 32.3 G/DL (ref 31.4–37.4)
MCHC RBC AUTO-ENTMCNC: 32.5 G/DL (ref 31.4–37.4)
MCHC RBC AUTO-ENTMCNC: 32.9 G/DL (ref 31.4–37.4)
MCHC RBC AUTO-ENTMCNC: 33 G/DL (ref 31.4–37.4)
MCHC RBC AUTO-ENTMCNC: 33.1 G/DL (ref 31.4–37.4)
MCHC RBC AUTO-ENTMCNC: 35.1 G/DL (ref 31.4–37.4)
MCV RBC AUTO: 89 FL (ref 82–98)
MCV RBC AUTO: 93 FL (ref 82–98)
MCV RBC AUTO: 94 FL (ref 82–98)
MCV RBC AUTO: 95 FL (ref 82–98)
MCV RBC AUTO: 95 FL (ref 82–98)
MCV RBC AUTO: 96 FL (ref 82–98)
MCV RBC AUTO: 98 FL (ref 82–98)
METAMYELOCYTES NFR BLD MANUAL: 1 % (ref 0–1)
MONOCYTES # BLD AUTO: 0 THOUSAND/UL (ref 0–1.22)
MONOCYTES # BLD AUTO: 0.03 THOUSAND/UL (ref 0–1.22)
MONOCYTES # BLD AUTO: 0.17 THOUSAND/ÂΜL (ref 0.17–1.22)
MONOCYTES # BLD AUTO: 0.18 THOUSAND/UL (ref 0–1.22)
MONOCYTES # BLD AUTO: 0.29 THOUSAND/ÂΜL (ref 0.17–1.22)
MONOCYTES # BLD AUTO: 0.35 THOUSAND/UL (ref 0–1.22)
MONOCYTES # BLD AUTO: 0.73 THOUSAND/ÂΜL (ref 0.17–1.22)
MONOCYTES # BLD AUTO: 0.76 THOUSAND/ÂΜL (ref 0.17–1.22)
MONOCYTES # BLD AUTO: 0.82 THOUSAND/UL (ref 0–1.22)
MONOCYTES NFR BLD AUTO: 10 % (ref 4–12)
MONOCYTES NFR BLD AUTO: 2 % (ref 4–12)
MONOCYTES NFR BLD AUTO: 4 % (ref 4–12)
MONOCYTES NFR BLD AUTO: 9 % (ref 4–12)
MONOCYTES NFR BLD: 0 % (ref 4–12)
MONOCYTES NFR BLD: 1 % (ref 4–12)
MONOCYTES NFR BLD: 10 % (ref 4–12)
MONOCYTES NFR BLD: 2 % (ref 4–12)
MONOCYTES NFR BLD: 4 % (ref 4–12)
MRSA NOSE QL CULT: NORMAL
MV E'TISSUE VEL-SEP: 11 CM/S
MV PEAK A VEL: 0.71 M/S
MV PEAK E VEL: 78 CM/S
MV STENOSIS PRESSURE HALF TIME: 56 MS
MV VALVE AREA P 1/2 METHOD: 3.93
MYELOCYTES NFR BLD MANUAL: 1 % (ref 0–1)
NEUTROPHILS # BLD AUTO: 5.22 THOUSANDS/ÂΜL (ref 1.85–7.62)
NEUTROPHILS # BLD AUTO: 6.14 THOUSANDS/ÂΜL (ref 1.85–7.62)
NEUTROPHILS # BLD AUTO: 6.79 THOUSANDS/ÂΜL (ref 1.85–7.62)
NEUTROPHILS # BLD AUTO: 7.55 THOUSANDS/ÂΜL (ref 1.85–7.62)
NEUTROPHILS # BLD MANUAL: 2.31 THOUSAND/UL (ref 1.85–7.62)
NEUTROPHILS # BLD MANUAL: 2.43 THOUSAND/UL (ref 1.85–7.62)
NEUTROPHILS # BLD MANUAL: 6.25 THOUSAND/UL (ref 1.85–7.62)
NEUTROPHILS # BLD MANUAL: 6.67 THOUSAND/UL (ref 1.85–7.62)
NEUTROPHILS # BLD MANUAL: 7.99 THOUSAND/UL (ref 1.85–7.62)
NEUTS BAND NFR BLD MANUAL: 17 % (ref 0–8)
NEUTS BAND NFR BLD MANUAL: 2 % (ref 0–8)
NEUTS BAND NFR BLD MANUAL: 23 % (ref 0–8)
NEUTS BAND NFR BLD MANUAL: 3 % (ref 0–8)
NEUTS SEG NFR BLD AUTO: 62 % (ref 43–75)
NEUTS SEG NFR BLD AUTO: 65 % (ref 43–75)
NEUTS SEG NFR BLD AUTO: 69 % (ref 43–75)
NEUTS SEG NFR BLD AUTO: 73 % (ref 43–75)
NEUTS SEG NFR BLD AUTO: 75 % (ref 43–75)
NEUTS SEG NFR BLD AUTO: 76 % (ref 43–75)
NEUTS SEG NFR BLD AUTO: 87 % (ref 43–75)
NEUTS SEG NFR BLD AUTO: 88 %
NEUTS SEG NFR BLD AUTO: 89 % (ref 43–75)
NEUTS SEG NFR BLD AUTO: 91 % (ref 43–75)
NON VENT- BIPAP: ABNORMAL
NRBC BLD AUTO-RTO: 0 /100 WBCS
NRBC BLD AUTO-RTO: 1 /100 WBC (ref 0–2)
NRBC BLD AUTO-RTO: 1 /100 WBCS
NRBC BLD AUTO-RTO: 3 /100 WBC (ref 0–2)
O2 CT BLDA-SCNC: 12.2 ML/DL (ref 16–23)
OSMOLALITY UR/SERPL-RTO: 279 MMOL/KG (ref 282–298)
OTHER FIO2: 90 %
OVALOCYTES BLD QL SMEAR: PRESENT
OXYHGB MFR BLDA: 88.5 % (ref 94–97)
P AXIS: 81 DEGREES
PCO2 BLDA: 43.1 MM HG (ref 36–44)
PEEP MAX SETTING VENT: 8 CM[H2O]
PH BLDA: 7.47 [PH] (ref 7.35–7.45)
PHOSPHATE SERPL-MCNC: 4.4 MG/DL (ref 2.3–4.1)
PLATELET # BLD AUTO: 211 THOUSANDS/UL (ref 149–390)
PLATELET # BLD AUTO: 213 THOUSANDS/UL (ref 149–390)
PLATELET # BLD AUTO: 219 THOUSANDS/UL (ref 149–390)
PLATELET # BLD AUTO: 220 THOUSANDS/UL (ref 149–390)
PLATELET # BLD AUTO: 240 THOUSANDS/UL (ref 149–390)
PLATELET # BLD AUTO: 240 THOUSANDS/UL (ref 149–390)
PLATELET # BLD AUTO: 243 THOUSANDS/UL (ref 149–390)
PLATELET # BLD AUTO: 263 THOUSANDS/UL (ref 149–390)
PLATELET # BLD AUTO: 267 THOUSANDS/UL (ref 149–390)
PLATELET # BLD AUTO: 273 THOUSANDS/UL (ref 149–390)
PLATELET BLD QL SMEAR: ADEQUATE
PMV BLD AUTO: 10.1 FL (ref 8.9–12.7)
PMV BLD AUTO: 10.6 FL (ref 8.9–12.7)
PMV BLD AUTO: 10.8 FL (ref 8.9–12.7)
PMV BLD AUTO: 11.2 FL (ref 8.9–12.7)
PMV BLD AUTO: 8.9 FL (ref 8.9–12.7)
PMV BLD AUTO: 9 FL (ref 8.9–12.7)
PMV BLD AUTO: 9.1 FL (ref 8.9–12.7)
PMV BLD AUTO: 9.3 FL (ref 8.9–12.7)
PMV BLD AUTO: 9.7 FL (ref 8.9–12.7)
PMV BLD AUTO: 9.7 FL (ref 8.9–12.7)
PO2 BLDA: 57.8 MM HG (ref 75–129)
POIKILOCYTOSIS BLD QL SMEAR: PRESENT
POIKILOCYTOSIS BLD QL SMEAR: PRESENT
POLYCHROMASIA BLD QL SMEAR: PRESENT
POTASSIUM SERPL-SCNC: 3.9 MMOL/L (ref 3.5–5.3)
POTASSIUM SERPL-SCNC: 4 MMOL/L (ref 3.5–5.3)
POTASSIUM SERPL-SCNC: 4.1 MMOL/L (ref 3.5–5.3)
POTASSIUM SERPL-SCNC: 4.2 MMOL/L (ref 3.5–5.3)
POTASSIUM SERPL-SCNC: 4.3 MMOL/L (ref 3.5–5.3)
POTASSIUM SERPL-SCNC: 4.4 MMOL/L (ref 3.5–5.3)
POTASSIUM SERPL-SCNC: 4.4 MMOL/L (ref 3.5–5.3)
POTASSIUM SERPL-SCNC: 4.6 MMOL/L (ref 3.5–5.3)
POTASSIUM SERPL-SCNC: 4.7 MMOL/L (ref 3.5–5.3)
PR INTERVAL: 154 MS
PROCALCITONIN SERPL-MCNC: 0.64 NG/ML
PROCALCITONIN SERPL-MCNC: 2.09 NG/ML
PROT FLD-MCNC: 3.6 G/DL
PROT SERPL-MCNC: 6.2 G/DL (ref 6.4–8.4)
PROT SERPL-MCNC: 6.7 G/DL (ref 6.4–8.4)
PROTHROMBIN TIME: 14.3 SECONDS (ref 11.6–14.5)
PROTHROMBIN TIME: 15.2 SECONDS (ref 11.6–14.5)
QRS AXIS: 112 DEGREES
QRSD INTERVAL: 68 MS
QT INTERVAL: 322 MS
QTC INTERVAL: 421 MS
RA PRESSURE ESTIMATED: 5 MMHG
RBC # BLD AUTO: 2.6 MILLION/UL (ref 3.88–5.62)
RBC # BLD AUTO: 2.7 MILLION/UL (ref 3.88–5.62)
RBC # BLD AUTO: 2.75 MILLION/UL (ref 3.88–5.62)
RBC # BLD AUTO: 2.77 MILLION/UL (ref 3.88–5.62)
RBC # BLD AUTO: 2.79 MILLION/UL (ref 3.88–5.62)
RBC # BLD AUTO: 3.15 MILLION/UL (ref 3.88–5.62)
RBC # BLD AUTO: 3.17 MILLION/UL (ref 3.88–5.62)
RBC # BLD AUTO: 3.24 MILLION/UL (ref 3.88–5.62)
RBC # BLD AUTO: 3.9 MILLION/UL (ref 3.88–5.62)
RBC MORPH BLD: PRESENT
RIGHT ATRIAL 2D VOLUME: 23 ML
RIGHT ATRIUM AREA SYSTOLE A4C: 11 CM2
RIGHT VENTRICLE ID DIMENSION: 3.9 CM
RV PSP: 86 MMHG
SCHISTOCYTES BLD QL SMEAR: PRESENT
SITE: ABNORMAL
SL CV LEFT ATRIUM LENGTH A2C: 4.3 CM
SL CV LV EF: 70
SL CV PED ECHO LEFT VENTRICLE DIASTOLIC VOLUME (MOD BIPLANE) 2D: 32 ML
SL CV PED ECHO LEFT VENTRICLE SYSTOLIC VOLUME (MOD BIPLANE) 2D: 16 ML
SMUDGE CELLS BLD QL SMEAR: PRESENT
SODIUM SERPL-SCNC: 129 MMOL/L (ref 135–147)
SODIUM SERPL-SCNC: 131 MMOL/L (ref 135–147)
SODIUM SERPL-SCNC: 131 MMOL/L (ref 135–147)
SODIUM SERPL-SCNC: 133 MMOL/L (ref 135–147)
SODIUM SERPL-SCNC: 134 MMOL/L (ref 135–147)
SPECIMEN SOURCE: ABNORMAL
T WAVE AXIS: 78 DEGREES
TOTAL CELLS COUNTED SPEC: 100
TOXIC GRANULES BLD QL SMEAR: PRESENT
TR MAX PG: 81 MMHG
TR PEAK VELOCITY: 4.5 M/S
TRICUSPID ANNULAR PLANE SYSTOLIC EXCURSION: 1.7 CM
TRICUSPID VALVE PEAK REGURGITATION VELOCITY: 4.5 M/S
TSH SERPL DL<=0.05 MIU/L-ACNC: 1.76 UIU/ML (ref 0.45–4.5)
URATE SERPL-MCNC: 3.6 MG/DL (ref 3.5–8.5)
VARIANT LYMPHS # BLD AUTO: 1 %
VENTRICULAR RATE: 103 BPM
WBC # BLD AUTO: 2.64 THOUSAND/UL (ref 4.31–10.16)
WBC # BLD AUTO: 2.93 THOUSAND/UL (ref 4.31–10.16)
WBC # BLD AUTO: 7.59 THOUSAND/UL (ref 4.31–10.16)
WBC # BLD AUTO: 7.86 THOUSAND/UL (ref 4.31–10.16)
WBC # BLD AUTO: 8.22 THOUSAND/UL (ref 4.31–10.16)
WBC # BLD AUTO: 8.23 THOUSAND/UL (ref 4.31–10.16)
WBC # BLD AUTO: 8.25 THOUSAND/UL (ref 4.31–10.16)
WBC # BLD AUTO: 8.77 THOUSAND/UL (ref 4.31–10.16)
WBC # BLD AUTO: 8.98 THOUSAND/UL (ref 4.31–10.16)
WBC # FLD MANUAL: 1673 /UL

## 2024-01-01 PROCEDURE — 94640 AIRWAY INHALATION TREATMENT: CPT

## 2024-01-01 PROCEDURE — 77412 RADIATION TX DELIVERY LVL 3: CPT | Performed by: STUDENT IN AN ORGANIZED HEALTH CARE EDUCATION/TRAINING PROGRAM

## 2024-01-01 PROCEDURE — 94760 N-INVAS EAR/PLS OXIMETRY 1: CPT

## 2024-01-01 PROCEDURE — 94664 DEMO&/EVAL PT USE INHALER: CPT

## 2024-01-01 PROCEDURE — 84484 ASSAY OF TROPONIN QUANT: CPT

## 2024-01-01 PROCEDURE — 99232 SBSQ HOSP IP/OBS MODERATE 35: CPT | Performed by: INTERNAL MEDICINE

## 2024-01-01 PROCEDURE — 99232 SBSQ HOSP IP/OBS MODERATE 35: CPT | Performed by: STUDENT IN AN ORGANIZED HEALTH CARE EDUCATION/TRAINING PROGRAM

## 2024-01-01 PROCEDURE — 99255 IP/OBS CONSLTJ NEW/EST HI 80: CPT | Performed by: INTERNAL MEDICINE

## 2024-01-01 PROCEDURE — 77334 RADIATION TREATMENT AID(S): CPT | Performed by: INTERNAL MEDICINE

## 2024-01-01 PROCEDURE — 85730 THROMBOPLASTIN TIME PARTIAL: CPT | Performed by: INTERNAL MEDICINE

## 2024-01-01 PROCEDURE — 71045 X-RAY EXAM CHEST 1 VIEW: CPT

## 2024-01-01 PROCEDURE — 77280 THER RAD SIMULAJ FIELD SMPL: CPT | Performed by: INTERNAL MEDICINE

## 2024-01-01 PROCEDURE — 97110 THERAPEUTIC EXERCISES: CPT

## 2024-01-01 PROCEDURE — 73030 X-RAY EXAM OF SHOULDER: CPT

## 2024-01-01 PROCEDURE — NC001 PR NO CHARGE: Performed by: INTERNAL MEDICINE

## 2024-01-01 PROCEDURE — 77331 SPECIAL RADIATION DOSIMETRY: CPT | Performed by: INTERNAL MEDICINE

## 2024-01-01 PROCEDURE — 75989 ABSCESS DRAINAGE UNDER X-RAY: CPT | Performed by: INTERNAL MEDICINE

## 2024-01-01 PROCEDURE — 80053 COMPREHEN METABOLIC PANEL: CPT

## 2024-01-01 PROCEDURE — 80048 BASIC METABOLIC PNL TOTAL CA: CPT | Performed by: INTERNAL MEDICINE

## 2024-01-01 PROCEDURE — 94668 MNPJ CHEST WALL SBSQ: CPT

## 2024-01-01 PROCEDURE — 85007 BL SMEAR W/DIFF WBC COUNT: CPT | Performed by: INTERNAL MEDICINE

## 2024-01-01 PROCEDURE — NC001 PR NO CHARGE: Performed by: NURSE PRACTITIONER

## 2024-01-01 PROCEDURE — C1729 CATH, DRAINAGE: HCPCS

## 2024-01-01 PROCEDURE — 85730 THROMBOPLASTIN TIME PARTIAL: CPT | Performed by: NURSE PRACTITIONER

## 2024-01-01 PROCEDURE — 99233 SBSQ HOSP IP/OBS HIGH 50: CPT | Performed by: INTERNAL MEDICINE

## 2024-01-01 PROCEDURE — 77263 THER RADIOLOGY TX PLNG CPLX: CPT | Performed by: STUDENT IN AN ORGANIZED HEALTH CARE EDUCATION/TRAINING PROGRAM

## 2024-01-01 PROCEDURE — G1004 CDSM NDSC: HCPCS

## 2024-01-01 PROCEDURE — 93306 TTE W/DOPPLER COMPLETE: CPT

## 2024-01-01 PROCEDURE — 83735 ASSAY OF MAGNESIUM: CPT

## 2024-01-01 PROCEDURE — 97163 PT EVAL HIGH COMPLEX 45 MIN: CPT

## 2024-01-01 PROCEDURE — NC001 PR NO CHARGE: Performed by: STUDENT IN AN ORGANIZED HEALTH CARE EDUCATION/TRAINING PROGRAM

## 2024-01-01 PROCEDURE — 99233 SBSQ HOSP IP/OBS HIGH 50: CPT | Performed by: FAMILY MEDICINE

## 2024-01-01 PROCEDURE — 88341 IMHCHEM/IMCYTCHM EA ADD ANTB: CPT | Performed by: PATHOLOGY

## 2024-01-01 PROCEDURE — 99291 CRITICAL CARE FIRST HOUR: CPT | Performed by: INTERNAL MEDICINE

## 2024-01-01 PROCEDURE — 93970 EXTREMITY STUDY: CPT | Performed by: SURGERY

## 2024-01-01 PROCEDURE — 96374 THER/PROPH/DIAG INJ IV PUSH: CPT

## 2024-01-01 PROCEDURE — 94669 MECHANICAL CHEST WALL OSCILL: CPT

## 2024-01-01 PROCEDURE — 77412 RADIATION TX DELIVERY LVL 3: CPT | Performed by: INTERNAL MEDICINE

## 2024-01-01 PROCEDURE — 85027 COMPLETE CBC AUTOMATED: CPT

## 2024-01-01 PROCEDURE — 88112 CYTOPATH CELL ENHANCE TECH: CPT | Performed by: PATHOLOGY

## 2024-01-01 PROCEDURE — 0W993ZZ DRAINAGE OF RIGHT PLEURAL CAVITY, PERCUTANEOUS APPROACH: ICD-10-PCS | Performed by: STUDENT IN AN ORGANIZED HEALTH CARE EDUCATION/TRAINING PROGRAM

## 2024-01-01 PROCEDURE — 97116 GAIT TRAINING THERAPY: CPT

## 2024-01-01 PROCEDURE — 84157 ASSAY OF PROTEIN OTHER: CPT | Performed by: STUDENT IN AN ORGANIZED HEALTH CARE EDUCATION/TRAINING PROGRAM

## 2024-01-01 PROCEDURE — 77307 TELETHX ISODOSE PLAN CPLX: CPT | Performed by: INTERNAL MEDICINE

## 2024-01-01 PROCEDURE — 84550 ASSAY OF BLOOD/URIC ACID: CPT | Performed by: NURSE PRACTITIONER

## 2024-01-01 PROCEDURE — 93970 EXTREMITY STUDY: CPT

## 2024-01-01 PROCEDURE — 83615 LACTATE (LD) (LDH) ENZYME: CPT | Performed by: STUDENT IN AN ORGANIZED HEALTH CARE EDUCATION/TRAINING PROGRAM

## 2024-01-01 PROCEDURE — 99233 SBSQ HOSP IP/OBS HIGH 50: CPT | Performed by: STUDENT IN AN ORGANIZED HEALTH CARE EDUCATION/TRAINING PROGRAM

## 2024-01-01 PROCEDURE — 94002 VENT MGMT INPAT INIT DAY: CPT

## 2024-01-01 PROCEDURE — 87205 SMEAR GRAM STAIN: CPT | Performed by: STUDENT IN AN ORGANIZED HEALTH CARE EDUCATION/TRAINING PROGRAM

## 2024-01-01 PROCEDURE — 99223 1ST HOSP IP/OBS HIGH 75: CPT | Performed by: NURSE PRACTITIONER

## 2024-01-01 PROCEDURE — 89051 BODY FLUID CELL COUNT: CPT | Performed by: STUDENT IN AN ORGANIZED HEALTH CARE EDUCATION/TRAINING PROGRAM

## 2024-01-01 PROCEDURE — 99232 SBSQ HOSP IP/OBS MODERATE 35: CPT | Performed by: FAMILY MEDICINE

## 2024-01-01 PROCEDURE — 99285 EMERGENCY DEPT VISIT HI MDM: CPT

## 2024-01-01 PROCEDURE — 82945 GLUCOSE OTHER FLUID: CPT | Performed by: STUDENT IN AN ORGANIZED HEALTH CARE EDUCATION/TRAINING PROGRAM

## 2024-01-01 PROCEDURE — 97530 THERAPEUTIC ACTIVITIES: CPT

## 2024-01-01 PROCEDURE — 99233 SBSQ HOSP IP/OBS HIGH 50: CPT | Performed by: NURSE PRACTITIONER

## 2024-01-01 PROCEDURE — 77387 GUIDANCE FOR RADJ TX DLVR: CPT | Performed by: INTERNAL MEDICINE

## 2024-01-01 PROCEDURE — 85610 PROTHROMBIN TIME: CPT | Performed by: INTERNAL MEDICINE

## 2024-01-01 PROCEDURE — 85025 COMPLETE CBC W/AUTO DIFF WBC: CPT

## 2024-01-01 PROCEDURE — 36600 WITHDRAWAL OF ARTERIAL BLOOD: CPT

## 2024-01-01 PROCEDURE — 99255 IP/OBS CONSLTJ NEW/EST HI 80: CPT | Performed by: STUDENT IN AN ORGANIZED HEALTH CARE EDUCATION/TRAINING PROGRAM

## 2024-01-01 PROCEDURE — 85049 AUTOMATED PLATELET COUNT: CPT | Performed by: NURSE PRACTITIONER

## 2024-01-01 PROCEDURE — G6002 STEREOSCOPIC X-RAY GUIDANCE: HCPCS | Performed by: INTERNAL MEDICINE

## 2024-01-01 PROCEDURE — 93010 ELECTROCARDIOGRAM REPORT: CPT | Performed by: INTERNAL MEDICINE

## 2024-01-01 PROCEDURE — 84443 ASSAY THYROID STIM HORMONE: CPT | Performed by: NURSE PRACTITIONER

## 2024-01-01 PROCEDURE — 87070 CULTURE OTHR SPECIMN AEROBIC: CPT | Performed by: STUDENT IN AN ORGANIZED HEALTH CARE EDUCATION/TRAINING PROGRAM

## 2024-01-01 PROCEDURE — 85027 COMPLETE CBC AUTOMATED: CPT | Performed by: INTERNAL MEDICINE

## 2024-01-01 PROCEDURE — 96376 TX/PRO/DX INJ SAME DRUG ADON: CPT

## 2024-01-01 PROCEDURE — 72156 MRI NECK SPINE W/O & W/DYE: CPT

## 2024-01-01 PROCEDURE — 0W9930Z DRAINAGE OF RIGHT PLEURAL CAVITY WITH DRAINAGE DEVICE, PERCUTANEOUS APPROACH: ICD-10-PCS | Performed by: INTERNAL MEDICINE

## 2024-01-01 PROCEDURE — 85730 THROMBOPLASTIN TIME PARTIAL: CPT

## 2024-01-01 PROCEDURE — 32555 ASPIRATE PLEURA W/ IMAGING: CPT | Performed by: STUDENT IN AN ORGANIZED HEALTH CARE EDUCATION/TRAINING PROGRAM

## 2024-01-01 PROCEDURE — 82805 BLOOD GASES W/O2 SATURATION: CPT | Performed by: FAMILY MEDICINE

## 2024-01-01 PROCEDURE — 83930 ASSAY OF BLOOD OSMOLALITY: CPT | Performed by: NURSE PRACTITIONER

## 2024-01-01 PROCEDURE — 3E03305 INTRODUCTION OF OTHER ANTINEOPLASTIC INTO PERIPHERAL VEIN, PERCUTANEOUS APPROACH: ICD-10-PCS | Performed by: INTERNAL MEDICINE

## 2024-01-01 PROCEDURE — 77290 THER RAD SIMULAJ FIELD CPLX: CPT | Performed by: STUDENT IN AN ORGANIZED HEALTH CARE EDUCATION/TRAINING PROGRAM

## 2024-01-01 PROCEDURE — 77412 RADIATION TX DELIVERY LVL 3: CPT | Performed by: RADIOLOGY

## 2024-01-01 PROCEDURE — 93306 TTE W/DOPPLER COMPLETE: CPT | Performed by: INTERNAL MEDICINE

## 2024-01-01 PROCEDURE — 85025 COMPLETE CBC W/AUTO DIFF WBC: CPT | Performed by: INTERNAL MEDICINE

## 2024-01-01 PROCEDURE — 85610 PROTHROMBIN TIME: CPT

## 2024-01-01 PROCEDURE — 87081 CULTURE SCREEN ONLY: CPT

## 2024-01-01 PROCEDURE — 94003 VENT MGMT INPAT SUBQ DAY: CPT

## 2024-01-01 PROCEDURE — 88305 TISSUE EXAM BY PATHOLOGIST: CPT | Performed by: PATHOLOGY

## 2024-01-01 PROCEDURE — 71275 CT ANGIOGRAPHY CHEST: CPT

## 2024-01-01 PROCEDURE — 85007 BL SMEAR W/DIFF WBC COUNT: CPT

## 2024-01-01 PROCEDURE — 72157 MRI CHEST SPINE W/O & W/DYE: CPT

## 2024-01-01 PROCEDURE — 85730 THROMBOPLASTIN TIME PARTIAL: CPT | Performed by: FAMILY MEDICINE

## 2024-01-01 PROCEDURE — 93005 ELECTROCARDIOGRAM TRACING: CPT

## 2024-01-01 PROCEDURE — 80048 BASIC METABOLIC PNL TOTAL CA: CPT

## 2024-01-01 PROCEDURE — 87040 BLOOD CULTURE FOR BACTERIA: CPT

## 2024-01-01 PROCEDURE — 32550 INSERT PLEURAL CATH: CPT | Performed by: INTERNAL MEDICINE

## 2024-01-01 PROCEDURE — 82533 TOTAL CORTISOL: CPT | Performed by: NURSE PRACTITIONER

## 2024-01-01 PROCEDURE — 99418 PROLNG IP/OBS E/M EA 15 MIN: CPT | Performed by: STUDENT IN AN ORGANIZED HEALTH CARE EDUCATION/TRAINING PROGRAM

## 2024-01-01 PROCEDURE — 84145 PROCALCITONIN (PCT): CPT

## 2024-01-01 PROCEDURE — 88342 IMHCHEM/IMCYTCHM 1ST ANTB: CPT | Performed by: PATHOLOGY

## 2024-01-01 PROCEDURE — 99238 HOSP IP/OBS DSCHRG MGMT 30/<: CPT | Performed by: NURSE PRACTITIONER

## 2024-01-01 PROCEDURE — 83605 ASSAY OF LACTIC ACID: CPT

## 2024-01-01 PROCEDURE — 84100 ASSAY OF PHOSPHORUS: CPT

## 2024-01-01 PROCEDURE — A9585 GADOBUTROL INJECTION: HCPCS | Performed by: INTERNAL MEDICINE

## 2024-01-01 PROCEDURE — 97167 OT EVAL HIGH COMPLEX 60 MIN: CPT

## 2024-01-01 PROCEDURE — 82948 REAGENT STRIP/BLOOD GLUCOSE: CPT

## 2024-01-01 PROCEDURE — 99231 SBSQ HOSP IP/OBS SF/LOW 25: CPT | Performed by: INTERNAL MEDICINE

## 2024-01-01 PROCEDURE — 83880 ASSAY OF NATRIURETIC PEPTIDE: CPT

## 2024-01-01 PROCEDURE — 99285 EMERGENCY DEPT VISIT HI MDM: CPT | Performed by: STUDENT IN AN ORGANIZED HEALTH CARE EDUCATION/TRAINING PROGRAM

## 2024-01-01 PROCEDURE — 72158 MRI LUMBAR SPINE W/O & W/DYE: CPT

## 2024-01-01 PROCEDURE — 36415 COLL VENOUS BLD VENIPUNCTURE: CPT

## 2024-01-01 RX ORDER — AMOXICILLIN 250 MG
2 CAPSULE ORAL 2 TIMES DAILY
Status: DISCONTINUED | OUTPATIENT
Start: 2024-01-01 | End: 2024-01-01

## 2024-01-01 RX ORDER — POLYETHYLENE GLYCOL 3350 17 G/17G
17 POWDER, FOR SOLUTION ORAL DAILY
Status: DISCONTINUED | OUTPATIENT
Start: 2024-01-01 | End: 2024-01-01

## 2024-01-01 RX ORDER — GABAPENTIN 100 MG/1
100 CAPSULE ORAL EVERY MORNING
Status: DISCONTINUED | OUTPATIENT
Start: 2024-01-01 | End: 2024-01-01

## 2024-01-01 RX ORDER — HEPARIN SODIUM 1000 [USP'U]/ML
2400 INJECTION, SOLUTION INTRAVENOUS; SUBCUTANEOUS EVERY 6 HOURS PRN
Status: DISCONTINUED | OUTPATIENT
Start: 2024-01-01 | End: 2024-01-01

## 2024-01-01 RX ORDER — IPRATROPIUM BROMIDE AND ALBUTEROL SULFATE 2.5; .5 MG/3ML; MG/3ML
3 SOLUTION RESPIRATORY (INHALATION)
Status: DISCONTINUED | OUTPATIENT
Start: 2024-01-01 | End: 2024-01-01

## 2024-01-01 RX ORDER — HYDROMORPHONE HYDROCHLORIDE 4 MG/1
4 TABLET ORAL EVERY 8 HOURS SCHEDULED
Status: DISCONTINUED | OUTPATIENT
Start: 2024-01-01 | End: 2024-01-01

## 2024-01-01 RX ORDER — FOLIC ACID 1 MG/1
1 TABLET ORAL DAILY
Status: DISCONTINUED | OUTPATIENT
Start: 2024-01-01 | End: 2024-01-01

## 2024-01-01 RX ORDER — HYDROMORPHONE HCL/PF 1 MG/ML
1 SYRINGE (ML) INJECTION
Status: DISCONTINUED | OUTPATIENT
Start: 2024-01-01 | End: 2024-01-01

## 2024-01-01 RX ORDER — LIDOCAINE 50 MG/G
2 PATCH TOPICAL DAILY
Status: DISCONTINUED | OUTPATIENT
Start: 2024-01-01 | End: 2024-01-01 | Stop reason: HOSPADM

## 2024-01-01 RX ORDER — BISACODYL 10 MG
10 SUPPOSITORY, RECTAL RECTAL DAILY
Status: COMPLETED | OUTPATIENT
Start: 2024-01-01 | End: 2024-01-01

## 2024-01-01 RX ORDER — HYDROMORPHONE HCL/PF 1 MG/ML
1 SYRINGE (ML) INJECTION ONCE
Status: COMPLETED | OUTPATIENT
Start: 2024-01-01 | End: 2024-01-01

## 2024-01-01 RX ORDER — LEVALBUTEROL INHALATION SOLUTION 1.25 MG/3ML
1.25 SOLUTION RESPIRATORY (INHALATION)
Status: DISCONTINUED | OUTPATIENT
Start: 2024-01-01 | End: 2024-01-01

## 2024-01-01 RX ORDER — SENNOSIDES 8.6 MG
1 TABLET ORAL
Status: DISCONTINUED | OUTPATIENT
Start: 2024-01-01 | End: 2024-01-01

## 2024-01-01 RX ORDER — METHYLPREDNISOLONE SODIUM SUCCINATE 40 MG/ML
40 INJECTION, POWDER, LYOPHILIZED, FOR SOLUTION INTRAMUSCULAR; INTRAVENOUS EVERY 12 HOURS SCHEDULED
Status: DISCONTINUED | OUTPATIENT
Start: 2024-01-01 | End: 2024-01-01

## 2024-01-01 RX ORDER — HYDROMORPHONE HYDROCHLORIDE 4 MG/1
8 TABLET ORAL
Status: DISCONTINUED | OUTPATIENT
Start: 2024-01-01 | End: 2024-01-01

## 2024-01-01 RX ORDER — GABAPENTIN 100 MG/1
100 CAPSULE ORAL 3 TIMES DAILY
Status: DISCONTINUED | OUTPATIENT
Start: 2024-01-01 | End: 2024-01-01

## 2024-01-01 RX ORDER — HEPARIN SODIUM 1000 [USP'U]/ML
4800 INJECTION, SOLUTION INTRAVENOUS; SUBCUTANEOUS EVERY 6 HOURS PRN
Status: DISCONTINUED | OUTPATIENT
Start: 2024-01-01 | End: 2024-01-01

## 2024-01-01 RX ORDER — GABAPENTIN 100 MG/1
200 CAPSULE ORAL
Status: DISCONTINUED | OUTPATIENT
Start: 2024-01-01 | End: 2024-01-01

## 2024-01-01 RX ORDER — HYDROMORPHONE HCL/PF 1 MG/ML
1 SYRINGE (ML) INJECTION EVERY 2 HOUR PRN
Status: DISCONTINUED | OUTPATIENT
Start: 2024-01-01 | End: 2024-01-01 | Stop reason: HOSPADM

## 2024-01-01 RX ORDER — SODIUM CHLORIDE 9 MG/ML
20 INJECTION, SOLUTION INTRAVENOUS ONCE
Qty: 1000 ML | Refills: 0 | Status: DISCONTINUED | OUTPATIENT
Start: 2024-01-01 | End: 2024-01-01

## 2024-01-01 RX ORDER — HYDROMORPHONE HYDROCHLORIDE 2 MG/1
2 TABLET ORAL EVERY 6 HOURS
Status: DISCONTINUED | OUTPATIENT
Start: 2024-01-01 | End: 2024-01-01

## 2024-01-01 RX ORDER — HYDROMORPHONE HCL IN WATER/PF 6 MG/30 ML
0.2 PATIENT CONTROLLED ANALGESIA SYRINGE INTRAVENOUS EVERY 4 HOURS PRN
Status: DISCONTINUED | OUTPATIENT
Start: 2024-01-01 | End: 2024-01-01

## 2024-01-01 RX ORDER — ACETAMINOPHEN 325 MG/1
975 TABLET ORAL EVERY 8 HOURS SCHEDULED
Status: DISCONTINUED | OUTPATIENT
Start: 2024-01-01 | End: 2024-01-01

## 2024-01-01 RX ORDER — AMOXICILLIN 250 MG
1 CAPSULE ORAL 2 TIMES DAILY
Status: DISCONTINUED | OUTPATIENT
Start: 2024-01-01 | End: 2024-01-01

## 2024-01-01 RX ORDER — METHYLPREDNISOLONE SODIUM SUCCINATE 40 MG/ML
40 INJECTION, POWDER, LYOPHILIZED, FOR SOLUTION INTRAMUSCULAR; INTRAVENOUS DAILY
Status: DISCONTINUED | OUTPATIENT
Start: 2024-01-01 | End: 2024-01-01

## 2024-01-01 RX ORDER — OXYCODONE HYDROCHLORIDE 5 MG/1
5 TABLET ORAL EVERY 6 HOURS PRN
Status: DISCONTINUED | OUTPATIENT
Start: 2024-01-01 | End: 2024-01-01

## 2024-01-01 RX ORDER — HYDROMORPHONE HYDROCHLORIDE 2 MG/1
2 TABLET ORAL EVERY 4 HOURS PRN
Status: DISCONTINUED | OUTPATIENT
Start: 2024-01-01 | End: 2024-01-01

## 2024-01-01 RX ORDER — DEXMEDETOMIDINE HYDROCHLORIDE 4 UG/ML
.1-.7 INJECTION, SOLUTION INTRAVENOUS
Status: DISCONTINUED | OUTPATIENT
Start: 2024-01-01 | End: 2024-01-01 | Stop reason: HOSPADM

## 2024-01-01 RX ORDER — FUROSEMIDE 10 MG/ML
40 INJECTION INTRAMUSCULAR; INTRAVENOUS ONCE
Status: COMPLETED | OUTPATIENT
Start: 2024-01-01 | End: 2024-01-01

## 2024-01-01 RX ORDER — HYDROMORPHONE HCL/PF 1 MG/ML
1 SYRINGE (ML) INJECTION EVERY 6 HOURS
Status: DISCONTINUED | OUTPATIENT
Start: 2024-01-01 | End: 2024-01-01

## 2024-01-01 RX ORDER — HYDROMORPHONE HYDROCHLORIDE 4 MG/1
8 TABLET ORAL EVERY 6 HOURS SCHEDULED
Status: DISCONTINUED | OUTPATIENT
Start: 2024-01-01 | End: 2024-01-01

## 2024-01-01 RX ORDER — CHLORHEXIDINE GLUCONATE ORAL RINSE 1.2 MG/ML
15 SOLUTION DENTAL EVERY 12 HOURS SCHEDULED
Status: DISCONTINUED | OUTPATIENT
Start: 2024-01-01 | End: 2024-01-01

## 2024-01-01 RX ORDER — MUSCLE RUB CREAM 100; 150 MG/G; MG/G
CREAM TOPICAL 3 TIMES DAILY
Status: DISCONTINUED | OUTPATIENT
Start: 2024-01-01 | End: 2024-01-01 | Stop reason: HOSPADM

## 2024-01-01 RX ORDER — HEPARIN SODIUM 1000 [USP'U]/ML
4800 INJECTION, SOLUTION INTRAVENOUS; SUBCUTANEOUS ONCE
Status: COMPLETED | OUTPATIENT
Start: 2024-01-01 | End: 2024-01-01

## 2024-01-01 RX ORDER — METHYLPREDNISOLONE SODIUM SUCCINATE 40 MG/ML
40 INJECTION, POWDER, LYOPHILIZED, FOR SOLUTION INTRAMUSCULAR; INTRAVENOUS EVERY 8 HOURS SCHEDULED
Status: DISCONTINUED | OUTPATIENT
Start: 2024-01-01 | End: 2024-01-01

## 2024-01-01 RX ORDER — GABAPENTIN 100 MG/1
200 CAPSULE ORAL 2 TIMES DAILY
Status: DISCONTINUED | OUTPATIENT
Start: 2024-01-01 | End: 2024-01-01

## 2024-01-01 RX ORDER — LORAZEPAM 2 MG/ML
1 INJECTION INTRAMUSCULAR
Status: DISCONTINUED | OUTPATIENT
Start: 2024-01-01 | End: 2024-01-01 | Stop reason: HOSPADM

## 2024-01-01 RX ORDER — ACETAMINOPHEN 325 MG/1
650 TABLET ORAL EVERY 6 HOURS PRN
Status: DISCONTINUED | OUTPATIENT
Start: 2024-01-01 | End: 2024-01-01

## 2024-01-01 RX ORDER — LORAZEPAM 2 MG/ML
1 INJECTION INTRAMUSCULAR ONCE
Status: COMPLETED | OUTPATIENT
Start: 2024-01-01 | End: 2024-01-01

## 2024-01-01 RX ORDER — HEPARIN SODIUM 10000 [USP'U]/100ML
3-30 INJECTION, SOLUTION INTRAVENOUS
Status: DISCONTINUED | OUTPATIENT
Start: 2024-01-01 | End: 2024-01-01

## 2024-01-01 RX ORDER — HYDROMORPHONE HYDROCHLORIDE 2 MG/1
2 TABLET ORAL EVERY 8 HOURS SCHEDULED
Status: DISCONTINUED | OUTPATIENT
Start: 2024-01-01 | End: 2024-01-01

## 2024-01-01 RX ORDER — FUROSEMIDE 10 MG/ML
20 INJECTION INTRAMUSCULAR; INTRAVENOUS ONCE
Status: COMPLETED | OUTPATIENT
Start: 2024-01-01 | End: 2024-01-01

## 2024-01-01 RX ORDER — BISACODYL 10 MG
10 SUPPOSITORY, RECTAL RECTAL DAILY PRN
Status: DISCONTINUED | OUTPATIENT
Start: 2024-01-01 | End: 2024-01-01

## 2024-01-01 RX ORDER — POLYETHYLENE GLYCOL 3350 17 G/17G
17 POWDER, FOR SOLUTION ORAL EVERY 12 HOURS
Status: DISCONTINUED | OUTPATIENT
Start: 2024-01-01 | End: 2024-01-01

## 2024-01-01 RX ORDER — LIDOCAINE HYDROCHLORIDE AND EPINEPHRINE 10; 10 MG/ML; UG/ML
20 INJECTION, SOLUTION INFILTRATION; PERINEURAL ONCE
Status: DISCONTINUED | OUTPATIENT
Start: 2024-01-01 | End: 2024-01-01

## 2024-01-01 RX ORDER — GLYCOPYRROLATE 0.2 MG/ML
0.1 INJECTION INTRAMUSCULAR; INTRAVENOUS EVERY 4 HOURS PRN
Status: DISCONTINUED | OUTPATIENT
Start: 2024-01-01 | End: 2024-01-01 | Stop reason: HOSPADM

## 2024-01-01 RX ORDER — HYDROMORPHONE HCL/PF 1 MG/ML
0.5 SYRINGE (ML) INJECTION EVERY 4 HOURS PRN
Status: DISCONTINUED | OUTPATIENT
Start: 2024-01-01 | End: 2024-01-01

## 2024-01-01 RX ORDER — OXYCODONE HYDROCHLORIDE 10 MG/1
10 TABLET ORAL EVERY 6 HOURS PRN
Status: DISCONTINUED | OUTPATIENT
Start: 2024-01-01 | End: 2024-01-01

## 2024-01-01 RX ORDER — HYDROMORPHONE HYDROCHLORIDE 4 MG/1
4 TABLET ORAL EVERY 4 HOURS PRN
Status: DISCONTINUED | OUTPATIENT
Start: 2024-01-01 | End: 2024-01-01

## 2024-01-01 RX ORDER — FLUTICASONE PROPIONATE 50 MCG
1 SPRAY, SUSPENSION (ML) NASAL DAILY
Status: DISCONTINUED | OUTPATIENT
Start: 2024-01-01 | End: 2024-01-01

## 2024-01-01 RX ORDER — HYDROMORPHONE HCL/PF 1 MG/ML
1 SYRINGE (ML) INJECTION EVERY 4 HOURS PRN
Status: DISCONTINUED | OUTPATIENT
Start: 2024-01-01 | End: 2024-01-01

## 2024-01-01 RX ORDER — ENOXAPARIN SODIUM 100 MG/ML
40 INJECTION SUBCUTANEOUS DAILY
Status: DISCONTINUED | OUTPATIENT
Start: 2024-01-01 | End: 2024-01-01

## 2024-01-01 RX ORDER — GADOBUTROL 604.72 MG/ML
6 INJECTION INTRAVENOUS
Status: COMPLETED | OUTPATIENT
Start: 2024-01-01 | End: 2024-01-01

## 2024-01-01 RX ORDER — ECHINACEA PURPUREA EXTRACT 125 MG
1 TABLET ORAL
Status: DISCONTINUED | OUTPATIENT
Start: 2024-01-01 | End: 2024-01-01

## 2024-01-01 RX ORDER — POLYETHYLENE GLYCOL 3350 17 G/17G
17 POWDER, FOR SOLUTION ORAL DAILY PRN
Status: DISCONTINUED | OUTPATIENT
Start: 2024-01-01 | End: 2024-01-01

## 2024-01-01 RX ORDER — HYDROMORPHONE HYDROCHLORIDE 4 MG/1
8 TABLET ORAL EVERY 4 HOURS PRN
Status: DISCONTINUED | OUTPATIENT
Start: 2024-01-01 | End: 2024-01-01

## 2024-01-01 RX ORDER — NICOTINE 21 MG/24HR
14 PATCH, TRANSDERMAL 24 HOURS TRANSDERMAL DAILY
Status: DISCONTINUED | OUTPATIENT
Start: 2024-01-01 | End: 2024-01-01 | Stop reason: HOSPADM

## 2024-01-01 RX ORDER — GABAPENTIN 100 MG/1
100 CAPSULE ORAL
Status: DISCONTINUED | OUTPATIENT
Start: 2024-01-01 | End: 2024-01-01

## 2024-01-01 RX ORDER — ENOXAPARIN SODIUM 100 MG/ML
40 INJECTION SUBCUTANEOUS ONCE
Status: COMPLETED | OUTPATIENT
Start: 2024-01-01 | End: 2024-01-01

## 2024-01-01 RX ORDER — ALBUTEROL SULFATE 2.5 MG/3ML
SOLUTION RESPIRATORY (INHALATION)
Status: DISCONTINUED
Start: 2024-01-01 | End: 2024-01-01 | Stop reason: WASHOUT

## 2024-01-01 RX ORDER — KETOROLAC TROMETHAMINE 30 MG/ML
15 INJECTION, SOLUTION INTRAMUSCULAR; INTRAVENOUS ONCE
Status: COMPLETED | OUTPATIENT
Start: 2024-01-01 | End: 2024-01-01

## 2024-01-01 RX ORDER — DEXAMETHASONE 4 MG/1
4 TABLET ORAL EVERY 12 HOURS SCHEDULED
Status: COMPLETED | OUTPATIENT
Start: 2024-01-01 | End: 2024-01-01

## 2024-01-01 RX ADMIN — GLYCOPYRROLATE 0.1 MG: 0.2 INJECTION INTRAMUSCULAR; INTRAVENOUS at 23:40

## 2024-01-01 RX ADMIN — HYDROMORPHONE HYDROCHLORIDE 8 MG: 4 TABLET ORAL at 05:03

## 2024-01-01 RX ADMIN — MENTHOL, UNSPECIFIED FORM AND METHYL SALICYLATE: 10; 150 CREAM TOPICAL at 07:58

## 2024-01-01 RX ADMIN — GABAPENTIN 200 MG: 100 CAPSULE ORAL at 17:39

## 2024-01-01 RX ADMIN — HYDROMORPHONE HYDROCHLORIDE 4 MG: 4 TABLET ORAL at 22:22

## 2024-01-01 RX ADMIN — HYDROMORPHONE HYDROCHLORIDE 8 MG: 4 TABLET ORAL at 12:59

## 2024-01-01 RX ADMIN — HYDROMORPHONE HYDROCHLORIDE 8 MG: 4 TABLET ORAL at 23:59

## 2024-01-01 RX ADMIN — IPRATROPIUM BROMIDE 0.5 MG: 0.5 SOLUTION RESPIRATORY (INHALATION) at 13:53

## 2024-01-01 RX ADMIN — HYDROMORPHONE HYDROCHLORIDE 6 MG: 2 TABLET ORAL at 09:00

## 2024-01-01 RX ADMIN — HYDROMORPHONE HYDROCHLORIDE 8 MG: 4 TABLET ORAL at 17:40

## 2024-01-01 RX ADMIN — LEVALBUTEROL HYDROCHLORIDE 1.25 MG: 1.25 SOLUTION RESPIRATORY (INHALATION) at 07:42

## 2024-01-01 RX ADMIN — HYDROMORPHONE HYDROCHLORIDE 8 MG: 4 TABLET ORAL at 00:29

## 2024-01-01 RX ADMIN — LEVALBUTEROL HYDROCHLORIDE 1.25 MG: 1.25 SOLUTION RESPIRATORY (INHALATION) at 19:45

## 2024-01-01 RX ADMIN — NICOTINE 14 MG: 14 PATCH, EXTENDED RELEASE TRANSDERMAL at 08:26

## 2024-01-01 RX ADMIN — HEPARIN SODIUM 18 UNITS/KG/HR: 10000 INJECTION, SOLUTION INTRAVENOUS at 13:05

## 2024-01-01 RX ADMIN — POLYETHYLENE GLYCOL 3350 17 G: 17 POWDER, FOR SOLUTION ORAL at 21:12

## 2024-01-01 RX ADMIN — NICOTINE 1 PATCH: 7 PATCH, EXTENDED RELEASE TRANSDERMAL at 08:46

## 2024-01-01 RX ADMIN — HYDROMORPHONE HYDROCHLORIDE 8 MG: 4 TABLET ORAL at 12:27

## 2024-01-01 RX ADMIN — HYDROMORPHONE HYDROCHLORIDE 6 MG: 2 TABLET ORAL at 07:44

## 2024-01-01 RX ADMIN — LEVALBUTEROL HYDROCHLORIDE 1.25 MG: 1.25 SOLUTION RESPIRATORY (INHALATION) at 19:30

## 2024-01-01 RX ADMIN — POLYETHYLENE GLYCOL 3350 17 G: 17 POWDER, FOR SOLUTION ORAL at 08:58

## 2024-01-01 RX ADMIN — HYDROMORPHONE HYDROCHLORIDE 6 MG: 2 TABLET ORAL at 21:47

## 2024-01-01 RX ADMIN — LEVALBUTEROL HYDROCHLORIDE 1.25 MG: 1.25 SOLUTION RESPIRATORY (INHALATION) at 13:31

## 2024-01-01 RX ADMIN — GABAPENTIN 100 MG: 100 CAPSULE ORAL at 21:56

## 2024-01-01 RX ADMIN — HYDROMORPHONE HYDROCHLORIDE 2 MG: 2 TABLET ORAL at 05:44

## 2024-01-01 RX ADMIN — GABAPENTIN 100 MG: 100 CAPSULE ORAL at 09:05

## 2024-01-01 RX ADMIN — HYDROMORPHONE HYDROCHLORIDE 8 MG: 4 TABLET ORAL at 02:14

## 2024-01-01 RX ADMIN — LEVALBUTEROL HYDROCHLORIDE 1.25 MG: 1.25 SOLUTION RESPIRATORY (INHALATION) at 08:10

## 2024-01-01 RX ADMIN — POLYETHYLENE GLYCOL 3350 17 G: 17 POWDER, FOR SOLUTION ORAL at 08:46

## 2024-01-01 RX ADMIN — METHYLPREDNISOLONE SODIUM SUCCINATE 40 MG: 40 INJECTION, POWDER, FOR SOLUTION INTRAMUSCULAR; INTRAVENOUS at 21:26

## 2024-01-01 RX ADMIN — LIDOCAINE 5% 2 PATCH: 700 PATCH TOPICAL at 08:58

## 2024-01-01 RX ADMIN — FLUTICASONE PROPIONATE 1 SPRAY: 50 SPRAY, METERED NASAL at 16:48

## 2024-01-01 RX ADMIN — ACETAMINOPHEN 975 MG: 325 TABLET, FILM COATED ORAL at 13:57

## 2024-01-01 RX ADMIN — BISACODYL 10 MG: 10 SUPPOSITORY RECTAL at 12:50

## 2024-01-01 RX ADMIN — GABAPENTIN 100 MG: 100 CAPSULE ORAL at 09:43

## 2024-01-01 RX ADMIN — GABAPENTIN 100 MG: 100 CAPSULE ORAL at 13:35

## 2024-01-01 RX ADMIN — LEVALBUTEROL HYDROCHLORIDE 1.25 MG: 1.25 SOLUTION RESPIRATORY (INHALATION) at 19:39

## 2024-01-01 RX ADMIN — IPRATROPIUM BROMIDE 0.5 MG: 0.5 SOLUTION RESPIRATORY (INHALATION) at 08:10

## 2024-01-01 RX ADMIN — MENTHOL, UNSPECIFIED FORM AND METHYL SALICYLATE: 10; 150 CREAM TOPICAL at 17:36

## 2024-01-01 RX ADMIN — SODIUM CHLORIDE 500 ML: 0.9 INJECTION, SOLUTION INTRAVENOUS at 19:56

## 2024-01-01 RX ADMIN — HYDROMORPHONE HYDROCHLORIDE 1 MG: 1 INJECTION, SOLUTION INTRAMUSCULAR; INTRAVENOUS; SUBCUTANEOUS at 11:50

## 2024-01-01 RX ADMIN — ACETAMINOPHEN 975 MG: 325 TABLET, FILM COATED ORAL at 05:39

## 2024-01-01 RX ADMIN — MENTHOL, UNSPECIFIED FORM AND METHYL SALICYLATE: 10; 150 CREAM TOPICAL at 21:38

## 2024-01-01 RX ADMIN — POLYETHYLENE GLYCOL 3350 17 G: 17 POWDER, FOR SOLUTION ORAL at 22:14

## 2024-01-01 RX ADMIN — HYDROMORPHONE HYDROCHLORIDE 8 MG: 4 TABLET ORAL at 18:05

## 2024-01-01 RX ADMIN — ACETAMINOPHEN 975 MG: 325 TABLET, FILM COATED ORAL at 22:57

## 2024-01-01 RX ADMIN — IPRATROPIUM BROMIDE 0.5 MG: 0.5 SOLUTION RESPIRATORY (INHALATION) at 07:28

## 2024-01-01 RX ADMIN — HYDROMORPHONE HYDROCHLORIDE 6 MG: 2 TABLET ORAL at 00:13

## 2024-01-01 RX ADMIN — MENTHOL, UNSPECIFIED FORM AND METHYL SALICYLATE 1 APPLICATION: 10; 150 CREAM TOPICAL at 21:48

## 2024-01-01 RX ADMIN — HYDROMORPHONE HYDROCHLORIDE 1 MG: 1 INJECTION, SOLUTION INTRAMUSCULAR; INTRAVENOUS; SUBCUTANEOUS at 23:45

## 2024-01-01 RX ADMIN — LEVALBUTEROL HYDROCHLORIDE 1.25 MG: 1.25 SOLUTION RESPIRATORY (INHALATION) at 07:00

## 2024-01-01 RX ADMIN — FLUTICASONE PROPIONATE 1 SPRAY: 50 SPRAY, METERED NASAL at 09:00

## 2024-01-01 RX ADMIN — GABAPENTIN 100 MG: 100 CAPSULE ORAL at 15:11

## 2024-01-01 RX ADMIN — HYDROMORPHONE HYDROCHLORIDE 1 MG: 1 INJECTION, SOLUTION INTRAMUSCULAR; INTRAVENOUS; SUBCUTANEOUS at 13:28

## 2024-01-01 RX ADMIN — Medication 1 MG/HR: at 23:23

## 2024-01-01 RX ADMIN — FOSAPREPITANT DIMEGLUMINE 150 MG: 150 INJECTION, POWDER, LYOPHILIZED, FOR SOLUTION INTRAVENOUS at 08:50

## 2024-01-01 RX ADMIN — IPRATROPIUM BROMIDE 0.5 MG: 0.5 SOLUTION RESPIRATORY (INHALATION) at 19:58

## 2024-01-01 RX ADMIN — IPRATROPIUM BROMIDE 0.5 MG: 0.5 SOLUTION RESPIRATORY (INHALATION) at 19:45

## 2024-01-01 RX ADMIN — LIDOCAINE 5% 2 PATCH: 700 PATCH TOPICAL at 09:06

## 2024-01-01 RX ADMIN — SENNOSIDES, DOCUSATE SODIUM 2 TABLET: 8.6; 5 TABLET ORAL at 18:05

## 2024-01-01 RX ADMIN — CYANOCOBALAMIN TAB 500 MCG 1000 MCG: 500 TAB at 09:06

## 2024-01-01 RX ADMIN — IPRATROPIUM BROMIDE 0.5 MG: 0.5 SOLUTION RESPIRATORY (INHALATION) at 08:03

## 2024-01-01 RX ADMIN — FUROSEMIDE 20 MG: 10 INJECTION, SOLUTION INTRAMUSCULAR; INTRAVENOUS at 10:37

## 2024-01-01 RX ADMIN — SENNOSIDES, DOCUSATE SODIUM 2 TABLET: 8.6; 5 TABLET ORAL at 18:24

## 2024-01-01 RX ADMIN — POLYETHYLENE GLYCOL 3350 17 G: 17 POWDER, FOR SOLUTION ORAL at 21:02

## 2024-01-01 RX ADMIN — IPRATROPIUM BROMIDE 0.5 MG: 0.5 SOLUTION RESPIRATORY (INHALATION) at 14:28

## 2024-01-01 RX ADMIN — NICOTINE 1 PATCH: 7 PATCH, EXTENDED RELEASE TRANSDERMAL at 08:17

## 2024-01-01 RX ADMIN — IPRATROPIUM BROMIDE 0.5 MG: 0.5 SOLUTION RESPIRATORY (INHALATION) at 07:06

## 2024-01-01 RX ADMIN — IPRATROPIUM BROMIDE 0.5 MG: 0.5 SOLUTION RESPIRATORY (INHALATION) at 13:34

## 2024-01-01 RX ADMIN — MENTHOL, UNSPECIFIED FORM AND METHYL SALICYLATE: 10; 150 CREAM TOPICAL at 16:18

## 2024-01-01 RX ADMIN — MENTHOL, UNSPECIFIED FORM AND METHYL SALICYLATE: 10; 150 CREAM TOPICAL at 17:48

## 2024-01-01 RX ADMIN — HYDROMORPHONE HYDROCHLORIDE 6 MG: 2 TABLET ORAL at 16:30

## 2024-01-01 RX ADMIN — SENNOSIDES, DOCUSATE SODIUM 2 TABLET: 8.6; 5 TABLET ORAL at 17:14

## 2024-01-01 RX ADMIN — HYDROMORPHONE HYDROCHLORIDE 0.2 MG: 0.2 INJECTION, SOLUTION INTRAMUSCULAR; INTRAVENOUS; SUBCUTANEOUS at 12:53

## 2024-01-01 RX ADMIN — HYDROMORPHONE HYDROCHLORIDE 8 MG: 4 TABLET ORAL at 05:14

## 2024-01-01 RX ADMIN — LEVALBUTEROL HYDROCHLORIDE 1.25 MG: 1.25 SOLUTION RESPIRATORY (INHALATION) at 19:00

## 2024-01-01 RX ADMIN — BISACODYL 10 MG: 10 SUPPOSITORY RECTAL at 10:49

## 2024-01-01 RX ADMIN — GABAPENTIN 100 MG: 100 CAPSULE ORAL at 21:16

## 2024-01-01 RX ADMIN — CYANOCOBALAMIN TAB 500 MCG 1000 MCG: 500 TAB at 09:43

## 2024-01-01 RX ADMIN — HEPARIN SODIUM 4800 UNITS: 1000 INJECTION INTRAVENOUS; SUBCUTANEOUS at 15:32

## 2024-01-01 RX ADMIN — ACETAMINOPHEN 975 MG: 325 TABLET, FILM COATED ORAL at 05:58

## 2024-01-01 RX ADMIN — METHYLPREDNISOLONE SODIUM SUCCINATE 40 MG: 40 INJECTION, POWDER, FOR SOLUTION INTRAMUSCULAR; INTRAVENOUS at 23:44

## 2024-01-01 RX ADMIN — ACETAMINOPHEN 975 MG: 325 TABLET, FILM COATED ORAL at 21:47

## 2024-01-01 RX ADMIN — MENTHOL, UNSPECIFIED FORM AND METHYL SALICYLATE: 10; 150 CREAM TOPICAL at 08:59

## 2024-01-01 RX ADMIN — FLUTICASONE PROPIONATE 1 SPRAY: 50 SPRAY, METERED NASAL at 07:58

## 2024-01-01 RX ADMIN — FOLIC ACID 1 MG: 1 TABLET ORAL at 09:06

## 2024-01-01 RX ADMIN — LIDOCAINE 5% 2 PATCH: 700 PATCH TOPICAL at 08:54

## 2024-01-01 RX ADMIN — HYDROMORPHONE HYDROCHLORIDE 4 MG: 4 TABLET ORAL at 03:58

## 2024-01-01 RX ADMIN — MENTHOL, UNSPECIFIED FORM AND METHYL SALICYLATE: 10; 150 CREAM TOPICAL at 16:57

## 2024-01-01 RX ADMIN — HYDROMORPHONE HYDROCHLORIDE 8 MG: 4 TABLET ORAL at 05:40

## 2024-01-01 RX ADMIN — NICOTINE 14 MG: 14 PATCH, EXTENDED RELEASE TRANSDERMAL at 08:50

## 2024-01-01 RX ADMIN — LEVALBUTEROL HYDROCHLORIDE 1.25 MG: 1.25 SOLUTION RESPIRATORY (INHALATION) at 20:16

## 2024-01-01 RX ADMIN — HYDROMORPHONE HYDROCHLORIDE 2 MG: 2 TABLET ORAL at 14:26

## 2024-01-01 RX ADMIN — IPRATROPIUM BROMIDE 0.5 MG: 0.5 SOLUTION RESPIRATORY (INHALATION) at 08:44

## 2024-01-01 RX ADMIN — ACETAMINOPHEN 975 MG: 325 TABLET, FILM COATED ORAL at 05:12

## 2024-01-01 RX ADMIN — SENNOSIDES, DOCUSATE SODIUM 2 TABLET: 8.6; 5 TABLET ORAL at 17:40

## 2024-01-01 RX ADMIN — FUROSEMIDE 40 MG: 10 INJECTION, SOLUTION INTRAMUSCULAR; INTRAVENOUS at 13:51

## 2024-01-01 RX ADMIN — HYDROMORPHONE HYDROCHLORIDE 0.2 MG: 0.2 INJECTION, SOLUTION INTRAMUSCULAR; INTRAVENOUS; SUBCUTANEOUS at 16:25

## 2024-01-01 RX ADMIN — ACETAMINOPHEN 975 MG: 325 TABLET, FILM COATED ORAL at 05:03

## 2024-01-01 RX ADMIN — HEPARIN SODIUM 15 UNITS/KG/HR: 10000 INJECTION, SOLUTION INTRAVENOUS at 22:26

## 2024-01-01 RX ADMIN — GABAPENTIN 100 MG: 100 CAPSULE ORAL at 21:55

## 2024-01-01 RX ADMIN — LEVALBUTEROL HYDROCHLORIDE 1.25 MG: 1.25 SOLUTION RESPIRATORY (INHALATION) at 20:02

## 2024-01-01 RX ADMIN — POLYETHYLENE GLYCOL 3350 17 G: 17 POWDER, FOR SOLUTION ORAL at 21:55

## 2024-01-01 RX ADMIN — LIDOCAINE 5% 2 PATCH: 700 PATCH TOPICAL at 08:51

## 2024-01-01 RX ADMIN — ACETAMINOPHEN 975 MG: 325 TABLET, FILM COATED ORAL at 17:40

## 2024-01-01 RX ADMIN — LEVALBUTEROL HYDROCHLORIDE 1.25 MG: 1.25 SOLUTION RESPIRATORY (INHALATION) at 20:59

## 2024-01-01 RX ADMIN — METHYLPREDNISOLONE SODIUM SUCCINATE 40 MG: 40 INJECTION, POWDER, FOR SOLUTION INTRAMUSCULAR; INTRAVENOUS at 08:54

## 2024-01-01 RX ADMIN — HYDROMORPHONE HYDROCHLORIDE 6 MG: 2 TABLET ORAL at 13:35

## 2024-01-01 RX ADMIN — MENTHOL, UNSPECIFIED FORM AND METHYL SALICYLATE: 10; 150 CREAM TOPICAL at 20:35

## 2024-01-01 RX ADMIN — ACETAMINOPHEN 975 MG: 325 TABLET, FILM COATED ORAL at 05:13

## 2024-01-01 RX ADMIN — HYDROMORPHONE HYDROCHLORIDE 8 MG: 4 TABLET ORAL at 01:06

## 2024-01-01 RX ADMIN — HYDROMORPHONE HYDROCHLORIDE 6 MG: 2 TABLET ORAL at 03:23

## 2024-01-01 RX ADMIN — NICOTINE 14 MG: 14 PATCH, EXTENDED RELEASE TRANSDERMAL at 08:59

## 2024-01-01 RX ADMIN — HYDROMORPHONE HYDROCHLORIDE 0.2 MG: 0.2 INJECTION, SOLUTION INTRAMUSCULAR; INTRAVENOUS; SUBCUTANEOUS at 10:49

## 2024-01-01 RX ADMIN — HYDROMORPHONE HYDROCHLORIDE 6 MG: 2 TABLET ORAL at 21:16

## 2024-01-01 RX ADMIN — HYDROMORPHONE HYDROCHLORIDE 8 MG: 4 TABLET ORAL at 05:47

## 2024-01-01 RX ADMIN — ACETAMINOPHEN 975 MG: 325 TABLET, FILM COATED ORAL at 13:10

## 2024-01-01 RX ADMIN — IPRATROPIUM BROMIDE 0.5 MG: 0.5 SOLUTION RESPIRATORY (INHALATION) at 20:59

## 2024-01-01 RX ADMIN — SENNOSIDES, DOCUSATE SODIUM 2 TABLET: 8.6; 5 TABLET ORAL at 08:58

## 2024-01-01 RX ADMIN — HYDROMORPHONE HYDROCHLORIDE 8 MG: 4 TABLET ORAL at 17:17

## 2024-01-01 RX ADMIN — FOLIC ACID 1 MG: 1 TABLET ORAL at 09:43

## 2024-01-01 RX ADMIN — HYDROMORPHONE HYDROCHLORIDE 8 MG: 4 TABLET ORAL at 01:43

## 2024-01-01 RX ADMIN — POLYETHYLENE GLYCOL 3350 17 G: 17 POWDER, FOR SOLUTION ORAL at 21:47

## 2024-01-01 RX ADMIN — HEPARIN SODIUM 17 UNITS/KG/HR: 10000 INJECTION, SOLUTION INTRAVENOUS at 20:16

## 2024-01-01 RX ADMIN — CEFTRIAXONE SODIUM 2000 MG: 10 INJECTION, POWDER, FOR SOLUTION INTRAVENOUS at 17:39

## 2024-01-01 RX ADMIN — IPRATROPIUM BROMIDE 0.5 MG: 0.5 SOLUTION RESPIRATORY (INHALATION) at 14:33

## 2024-01-01 RX ADMIN — CEFTRIAXONE SODIUM 2000 MG: 10 INJECTION, POWDER, FOR SOLUTION INTRAVENOUS at 17:46

## 2024-01-01 RX ADMIN — HYDROMORPHONE HYDROCHLORIDE 0.5 MG: 1 INJECTION, SOLUTION INTRAMUSCULAR; INTRAVENOUS; SUBCUTANEOUS at 15:32

## 2024-01-01 RX ADMIN — ENOXAPARIN SODIUM 40 MG: 40 INJECTION SUBCUTANEOUS at 08:45

## 2024-01-01 RX ADMIN — DEXAMETHASONE 4 MG: 4 TABLET ORAL at 21:02

## 2024-01-01 RX ADMIN — LORAZEPAM 1 MG: 2 INJECTION INTRAMUSCULAR; INTRAVENOUS at 23:24

## 2024-01-01 RX ADMIN — IPRATROPIUM BROMIDE 0.5 MG: 0.5 SOLUTION RESPIRATORY (INHALATION) at 19:39

## 2024-01-01 RX ADMIN — POLYETHYLENE GLYCOL 3350 17 G: 17 POWDER, FOR SOLUTION ORAL at 09:44

## 2024-01-01 RX ADMIN — HYDROMORPHONE HYDROCHLORIDE 8 MG: 4 TABLET ORAL at 18:22

## 2024-01-01 RX ADMIN — GABAPENTIN 100 MG: 100 CAPSULE ORAL at 21:47

## 2024-01-01 RX ADMIN — HYDROMORPHONE HYDROCHLORIDE 0.2 MG: 0.2 INJECTION, SOLUTION INTRAMUSCULAR; INTRAVENOUS; SUBCUTANEOUS at 22:39

## 2024-01-01 RX ADMIN — HYDROMORPHONE HYDROCHLORIDE 8 MG: 4 TABLET ORAL at 17:34

## 2024-01-01 RX ADMIN — SENNOSIDES, DOCUSATE SODIUM 2 TABLET: 8.6; 5 TABLET ORAL at 08:59

## 2024-01-01 RX ADMIN — ACETAMINOPHEN 975 MG: 325 TABLET, FILM COATED ORAL at 13:35

## 2024-01-01 RX ADMIN — ACETAMINOPHEN 975 MG: 325 TABLET, FILM COATED ORAL at 21:12

## 2024-01-01 RX ADMIN — HYDROMORPHONE HYDROCHLORIDE 8 MG: 4 TABLET ORAL at 05:48

## 2024-01-01 RX ADMIN — DEXAMETHASONE 4 MG: 4 TABLET ORAL at 09:06

## 2024-01-01 RX ADMIN — SENNOSIDES AND DOCUSATE SODIUM 1 TABLET: 8.6; 5 TABLET ORAL at 08:46

## 2024-01-01 RX ADMIN — HYDROMORPHONE HYDROCHLORIDE 8 MG: 4 TABLET ORAL at 17:14

## 2024-01-01 RX ADMIN — HYDROMORPHONE HYDROCHLORIDE 4 MG: 4 TABLET ORAL at 13:38

## 2024-01-01 RX ADMIN — POLYETHYLENE GLYCOL 3350 17 G: 17 POWDER, FOR SOLUTION ORAL at 09:05

## 2024-01-01 RX ADMIN — HYDROMORPHONE HYDROCHLORIDE 8 MG: 4 TABLET ORAL at 00:14

## 2024-01-01 RX ADMIN — HEPARIN SODIUM 4800 UNITS: 1000 INJECTION INTRAVENOUS; SUBCUTANEOUS at 13:03

## 2024-01-01 RX ADMIN — MENTHOL, UNSPECIFIED FORM AND METHYL SALICYLATE: 10; 150 CREAM TOPICAL at 20:23

## 2024-01-01 RX ADMIN — ACETAMINOPHEN 975 MG: 325 TABLET, FILM COATED ORAL at 13:51

## 2024-01-01 RX ADMIN — POLYETHYLENE GLYCOL 3350 17 G: 17 POWDER, FOR SOLUTION ORAL at 20:10

## 2024-01-01 RX ADMIN — HYDROMORPHONE HYDROCHLORIDE 6 MG: 2 TABLET ORAL at 19:57

## 2024-01-01 RX ADMIN — ACETAMINOPHEN 975 MG: 325 TABLET, FILM COATED ORAL at 05:14

## 2024-01-01 RX ADMIN — FLUTICASONE PROPIONATE 1 SPRAY: 50 SPRAY, METERED NASAL at 09:07

## 2024-01-01 RX ADMIN — LEVALBUTEROL HYDROCHLORIDE 1.25 MG: 1.25 SOLUTION RESPIRATORY (INHALATION) at 13:17

## 2024-01-01 RX ADMIN — METHYLPREDNISOLONE SODIUM SUCCINATE 40 MG: 40 INJECTION, POWDER, FOR SOLUTION INTRAMUSCULAR; INTRAVENOUS at 08:46

## 2024-01-01 RX ADMIN — HEPARIN SODIUM 4800 UNITS: 1000 INJECTION INTRAVENOUS; SUBCUTANEOUS at 13:12

## 2024-01-01 RX ADMIN — ACETAMINOPHEN 975 MG: 325 TABLET, FILM COATED ORAL at 05:19

## 2024-01-01 RX ADMIN — HYDROMORPHONE HYDROCHLORIDE 1 MG: 1 INJECTION, SOLUTION INTRAMUSCULAR; INTRAVENOUS; SUBCUTANEOUS at 19:55

## 2024-01-01 RX ADMIN — SENNOSIDES, DOCUSATE SODIUM 2 TABLET: 8.6; 5 TABLET ORAL at 08:51

## 2024-01-01 RX ADMIN — HYDROMORPHONE HYDROCHLORIDE 8 MG: 4 TABLET ORAL at 18:24

## 2024-01-01 RX ADMIN — MENTHOL, UNSPECIFIED FORM AND METHYL SALICYLATE: 10; 150 CREAM TOPICAL at 21:07

## 2024-01-01 RX ADMIN — NICOTINE 14 MG: 14 PATCH, EXTENDED RELEASE TRANSDERMAL at 10:48

## 2024-01-01 RX ADMIN — HYDROMORPHONE HYDROCHLORIDE 8 MG: 4 TABLET ORAL at 12:16

## 2024-01-01 RX ADMIN — HYDROMORPHONE HYDROCHLORIDE 1 MG: 1 INJECTION, SOLUTION INTRAMUSCULAR; INTRAVENOUS; SUBCUTANEOUS at 23:24

## 2024-01-01 RX ADMIN — ACETAMINOPHEN 975 MG: 325 TABLET, FILM COATED ORAL at 13:15

## 2024-01-01 RX ADMIN — HYDROMORPHONE HYDROCHLORIDE 6 MG: 2 TABLET ORAL at 20:42

## 2024-01-01 RX ADMIN — GABAPENTIN 100 MG: 100 CAPSULE ORAL at 08:51

## 2024-01-01 RX ADMIN — IPRATROPIUM BROMIDE 0.5 MG: 0.5 SOLUTION RESPIRATORY (INHALATION) at 07:09

## 2024-01-01 RX ADMIN — NICOTINE 14 MG: 14 PATCH, EXTENDED RELEASE TRANSDERMAL at 09:06

## 2024-01-01 RX ADMIN — MENTHOL, UNSPECIFIED FORM AND METHYL SALICYLATE: 10; 150 CREAM TOPICAL at 09:07

## 2024-01-01 RX ADMIN — GABAPENTIN 100 MG: 100 CAPSULE ORAL at 08:25

## 2024-01-01 RX ADMIN — IPRATROPIUM BROMIDE 0.5 MG: 0.5 SOLUTION RESPIRATORY (INHALATION) at 20:02

## 2024-01-01 RX ADMIN — HYDROMORPHONE HYDROCHLORIDE 2 MG: 2 TABLET ORAL at 21:54

## 2024-01-01 RX ADMIN — NICOTINE 14 MG: 14 PATCH, EXTENDED RELEASE TRANSDERMAL at 07:57

## 2024-01-01 RX ADMIN — ACETAMINOPHEN 975 MG: 325 TABLET, FILM COATED ORAL at 23:00

## 2024-01-01 RX ADMIN — METHYLPREDNISOLONE SODIUM SUCCINATE 40 MG: 40 INJECTION, POWDER, FOR SOLUTION INTRAMUSCULAR; INTRAVENOUS at 16:16

## 2024-01-01 RX ADMIN — CARBOPLATIN 575.5 MG: 600 INJECTION, SOLUTION INTRAVENOUS at 09:56

## 2024-01-01 RX ADMIN — GABAPENTIN 200 MG: 100 CAPSULE ORAL at 14:07

## 2024-01-01 RX ADMIN — HEPARIN SODIUM 23 UNITS/KG/HR: 10000 INJECTION, SOLUTION INTRAVENOUS at 18:28

## 2024-01-01 RX ADMIN — IPRATROPIUM BROMIDE 0.5 MG: 0.5 SOLUTION RESPIRATORY (INHALATION) at 07:16

## 2024-01-01 RX ADMIN — SENNOSIDES, DOCUSATE SODIUM 2 TABLET: 8.6; 5 TABLET ORAL at 09:05

## 2024-01-01 RX ADMIN — HYDROMORPHONE HYDROCHLORIDE 8 MG: 4 TABLET ORAL at 00:05

## 2024-01-01 RX ADMIN — HYDROMORPHONE HYDROCHLORIDE 8 MG: 4 TABLET ORAL at 09:07

## 2024-01-01 RX ADMIN — NICOTINE 14 MG: 14 PATCH, EXTENDED RELEASE TRANSDERMAL at 08:51

## 2024-01-01 RX ADMIN — LEVALBUTEROL HYDROCHLORIDE 1.25 MG: 1.25 SOLUTION RESPIRATORY (INHALATION) at 19:32

## 2024-01-01 RX ADMIN — LEVALBUTEROL HYDROCHLORIDE 1.25 MG: 1.25 SOLUTION RESPIRATORY (INHALATION) at 08:04

## 2024-01-01 RX ADMIN — SENNOSIDES, DOCUSATE SODIUM 2 TABLET: 8.6; 5 TABLET ORAL at 08:25

## 2024-01-01 RX ADMIN — GABAPENTIN 200 MG: 100 CAPSULE ORAL at 20:10

## 2024-01-01 RX ADMIN — GABAPENTIN 200 MG: 100 CAPSULE ORAL at 21:37

## 2024-01-01 RX ADMIN — HYDROMORPHONE HYDROCHLORIDE 8 MG: 4 TABLET ORAL at 17:35

## 2024-01-01 RX ADMIN — HYDROMORPHONE HYDROCHLORIDE 8 MG: 4 TABLET ORAL at 00:02

## 2024-01-01 RX ADMIN — MENTHOL, UNSPECIFIED FORM AND METHYL SALICYLATE: 10; 150 CREAM TOPICAL at 09:44

## 2024-01-01 RX ADMIN — GABAPENTIN 100 MG: 100 CAPSULE ORAL at 16:30

## 2024-01-01 RX ADMIN — HYDROMORPHONE HYDROCHLORIDE 1 MG: 1 INJECTION, SOLUTION INTRAMUSCULAR; INTRAVENOUS; SUBCUTANEOUS at 16:57

## 2024-01-01 RX ADMIN — HYDROMORPHONE HYDROCHLORIDE 1 MG: 1 INJECTION, SOLUTION INTRAMUSCULAR; INTRAVENOUS; SUBCUTANEOUS at 22:22

## 2024-01-01 RX ADMIN — MENTHOL, UNSPECIFIED FORM AND METHYL SALICYLATE: 10; 150 CREAM TOPICAL at 08:54

## 2024-01-01 RX ADMIN — NICOTINE 14 MG: 14 PATCH, EXTENDED RELEASE TRANSDERMAL at 08:52

## 2024-01-01 RX ADMIN — HYDROMORPHONE HYDROCHLORIDE 6 MG: 2 TABLET ORAL at 13:53

## 2024-01-01 RX ADMIN — KETOROLAC TROMETHAMINE 15 MG: 30 INJECTION, SOLUTION INTRAMUSCULAR; INTRAVENOUS at 12:05

## 2024-01-01 RX ADMIN — MENTHOL, UNSPECIFIED FORM AND METHYL SALICYLATE: 10; 150 CREAM TOPICAL at 16:24

## 2024-01-01 RX ADMIN — GABAPENTIN 100 MG: 100 CAPSULE ORAL at 13:00

## 2024-01-01 RX ADMIN — ACETAMINOPHEN 975 MG: 325 TABLET, FILM COATED ORAL at 21:36

## 2024-01-01 RX ADMIN — HYDROMORPHONE HYDROCHLORIDE 6 MG: 2 TABLET ORAL at 05:13

## 2024-01-01 RX ADMIN — HEPARIN SODIUM 17 UNITS/KG/HR: 10000 INJECTION, SOLUTION INTRAVENOUS at 00:29

## 2024-01-01 RX ADMIN — LEVALBUTEROL HYDROCHLORIDE 1.25 MG: 1.25 SOLUTION RESPIRATORY (INHALATION) at 13:34

## 2024-01-01 RX ADMIN — ACETAMINOPHEN 975 MG: 325 TABLET, FILM COATED ORAL at 05:48

## 2024-01-01 RX ADMIN — MENTHOL, UNSPECIFIED FORM AND METHYL SALICYLATE: 10; 150 CREAM TOPICAL at 22:15

## 2024-01-01 RX ADMIN — SENNOSIDES, DOCUSATE SODIUM 2 TABLET: 8.6; 5 TABLET ORAL at 18:22

## 2024-01-01 RX ADMIN — IPRATROPIUM BROMIDE 0.5 MG: 0.5 SOLUTION RESPIRATORY (INHALATION) at 13:10

## 2024-01-01 RX ADMIN — LIDOCAINE 5% 2 PATCH: 700 PATCH TOPICAL at 09:45

## 2024-01-01 RX ADMIN — ACETAMINOPHEN 975 MG: 325 TABLET, FILM COATED ORAL at 22:14

## 2024-01-01 RX ADMIN — LIDOCAINE 5% 2 PATCH: 700 PATCH TOPICAL at 08:52

## 2024-01-01 RX ADMIN — HYDROMORPHONE HYDROCHLORIDE 6 MG: 2 TABLET ORAL at 10:12

## 2024-01-01 RX ADMIN — HYDROMORPHONE HYDROCHLORIDE 8 MG: 4 TABLET ORAL at 23:14

## 2024-01-01 RX ADMIN — GADOBUTROL 6 ML: 604.72 INJECTION INTRAVENOUS at 21:53

## 2024-01-01 RX ADMIN — LEVALBUTEROL HYDROCHLORIDE 1.25 MG: 1.25 SOLUTION RESPIRATORY (INHALATION) at 07:16

## 2024-01-01 RX ADMIN — LEVALBUTEROL HYDROCHLORIDE 1.25 MG: 1.25 SOLUTION RESPIRATORY (INHALATION) at 20:41

## 2024-01-01 RX ADMIN — LEVALBUTEROL HYDROCHLORIDE 1.25 MG: 1.25 SOLUTION RESPIRATORY (INHALATION) at 07:09

## 2024-01-01 RX ADMIN — ENOXAPARIN SODIUM 40 MG: 40 INJECTION SUBCUTANEOUS at 08:50

## 2024-01-01 RX ADMIN — HYDROMORPHONE HYDROCHLORIDE 0.5 MG: 1 INJECTION, SOLUTION INTRAMUSCULAR; INTRAVENOUS; SUBCUTANEOUS at 15:11

## 2024-01-01 RX ADMIN — IPRATROPIUM BROMIDE 0.5 MG: 0.5 SOLUTION RESPIRATORY (INHALATION) at 19:32

## 2024-01-01 RX ADMIN — NICOTINE 14 MG: 14 PATCH, EXTENDED RELEASE TRANSDERMAL at 09:44

## 2024-01-01 RX ADMIN — MENTHOL, UNSPECIFIED FORM AND METHYL SALICYLATE: 10; 150 CREAM TOPICAL at 16:12

## 2024-01-01 RX ADMIN — ENOXAPARIN SODIUM 40 MG: 40 INJECTION SUBCUTANEOUS at 12:05

## 2024-01-01 RX ADMIN — CHLORHEXIDINE GLUCONATE 15 ML: 1.2 SOLUTION ORAL at 20:23

## 2024-01-01 RX ADMIN — LORAZEPAM 1 MG: 2 INJECTION INTRAMUSCULAR; INTRAVENOUS at 23:44

## 2024-01-01 RX ADMIN — HYDROMORPHONE HYDROCHLORIDE 2 MG: 2 TABLET ORAL at 05:39

## 2024-01-01 RX ADMIN — MENTHOL, UNSPECIFIED FORM AND METHYL SALICYLATE: 10; 150 CREAM TOPICAL at 21:58

## 2024-01-01 RX ADMIN — GABAPENTIN 100 MG: 100 CAPSULE ORAL at 08:58

## 2024-01-01 RX ADMIN — ACETAMINOPHEN 975 MG: 325 TABLET, FILM COATED ORAL at 14:07

## 2024-01-01 RX ADMIN — SENNOSIDES, DOCUSATE SODIUM 2 TABLET: 8.6; 5 TABLET ORAL at 17:17

## 2024-01-01 RX ADMIN — SENNOSIDES, DOCUSATE SODIUM 2 TABLET: 8.6; 5 TABLET ORAL at 10:13

## 2024-01-01 RX ADMIN — HYDROMORPHONE HYDROCHLORIDE 0.5 MG: 1 INJECTION, SOLUTION INTRAMUSCULAR; INTRAVENOUS; SUBCUTANEOUS at 12:23

## 2024-01-01 RX ADMIN — LEVALBUTEROL HYDROCHLORIDE 1.25 MG: 1.25 SOLUTION RESPIRATORY (INHALATION) at 07:28

## 2024-01-01 RX ADMIN — LEVALBUTEROL HYDROCHLORIDE 1.25 MG: 1.25 SOLUTION RESPIRATORY (INHALATION) at 13:53

## 2024-01-01 RX ADMIN — ACETAMINOPHEN 975 MG: 325 TABLET, FILM COATED ORAL at 21:55

## 2024-01-01 RX ADMIN — SENNOSIDES, DOCUSATE SODIUM 2 TABLET: 8.6; 5 TABLET ORAL at 09:43

## 2024-01-01 RX ADMIN — ACETAMINOPHEN 975 MG: 325 TABLET, FILM COATED ORAL at 14:02

## 2024-01-01 RX ADMIN — HYDROMORPHONE HYDROCHLORIDE 8 MG: 4 TABLET ORAL at 00:03

## 2024-01-01 RX ADMIN — SENNOSIDES, DOCUSATE SODIUM 2 TABLET: 8.6; 5 TABLET ORAL at 08:17

## 2024-01-01 RX ADMIN — IPRATROPIUM BROMIDE 0.5 MG: 0.5 SOLUTION RESPIRATORY (INHALATION) at 13:50

## 2024-01-01 RX ADMIN — POLYETHYLENE GLYCOL 3350 17 G: 17 POWDER, FOR SOLUTION ORAL at 11:32

## 2024-01-01 RX ADMIN — HYDROMORPHONE HYDROCHLORIDE 6 MG: 2 TABLET ORAL at 21:12

## 2024-01-01 RX ADMIN — HYDROMORPHONE HYDROCHLORIDE 6 MG: 2 TABLET ORAL at 20:10

## 2024-01-01 RX ADMIN — HYDROMORPHONE HYDROCHLORIDE 6 MG: 2 TABLET ORAL at 17:40

## 2024-01-01 RX ADMIN — HYDROMORPHONE HYDROCHLORIDE 4 MG: 4 TABLET ORAL at 18:24

## 2024-01-01 RX ADMIN — IPRATROPIUM BROMIDE 0.5 MG: 0.5 SOLUTION RESPIRATORY (INHALATION) at 07:00

## 2024-01-01 RX ADMIN — HYDROMORPHONE HYDROCHLORIDE 4 MG: 4 TABLET ORAL at 11:32

## 2024-01-01 RX ADMIN — HEPARIN SODIUM 2400 UNITS: 1000 INJECTION INTRAVENOUS; SUBCUTANEOUS at 21:56

## 2024-01-01 RX ADMIN — DEXAMETHASONE 4 MG: 4 TABLET ORAL at 21:37

## 2024-01-01 RX ADMIN — IPRATROPIUM BROMIDE 0.5 MG: 0.5 SOLUTION RESPIRATORY (INHALATION) at 19:30

## 2024-01-01 RX ADMIN — POLYETHYLENE GLYCOL 3350 17 G: 17 POWDER, FOR SOLUTION ORAL at 08:17

## 2024-01-01 RX ADMIN — IPRATROPIUM BROMIDE 0.5 MG: 0.5 SOLUTION RESPIRATORY (INHALATION) at 20:16

## 2024-01-01 RX ADMIN — DEXAMETHASONE 4 MG: 4 TABLET ORAL at 22:14

## 2024-01-01 RX ADMIN — FLUTICASONE PROPIONATE 1 SPRAY: 50 SPRAY, METERED NASAL at 09:44

## 2024-01-01 RX ADMIN — IOHEXOL 85 ML: 350 INJECTION, SOLUTION INTRAVENOUS at 18:44

## 2024-01-01 RX ADMIN — ACETAMINOPHEN 975 MG: 325 TABLET, FILM COATED ORAL at 17:39

## 2024-01-01 RX ADMIN — HEPARIN SODIUM 2400 UNITS: 1000 INJECTION INTRAVENOUS; SUBCUTANEOUS at 13:32

## 2024-01-01 RX ADMIN — GABAPENTIN 100 MG: 100 CAPSULE ORAL at 08:01

## 2024-01-01 RX ADMIN — OXYCODONE HYDROCHLORIDE 5 MG: 5 TABLET ORAL at 04:20

## 2024-01-01 RX ADMIN — HYDROMORPHONE HYDROCHLORIDE 6 MG: 2 TABLET ORAL at 16:11

## 2024-01-01 RX ADMIN — LEVALBUTEROL HYDROCHLORIDE 1.25 MG: 1.25 SOLUTION RESPIRATORY (INHALATION) at 19:57

## 2024-01-01 RX ADMIN — LEVALBUTEROL HYDROCHLORIDE 1.25 MG: 1.25 SOLUTION RESPIRATORY (INHALATION) at 13:10

## 2024-01-01 RX ADMIN — IPRATROPIUM BROMIDE 0.5 MG: 0.5 SOLUTION RESPIRATORY (INHALATION) at 13:31

## 2024-01-01 RX ADMIN — BISACODYL 10 MG: 10 SUPPOSITORY RECTAL at 08:51

## 2024-01-01 RX ADMIN — FOLIC ACID 1 MG: 1 TABLET ORAL at 08:01

## 2024-01-01 RX ADMIN — ACETAMINOPHEN 975 MG: 325 TABLET, FILM COATED ORAL at 21:56

## 2024-01-01 RX ADMIN — LEVALBUTEROL HYDROCHLORIDE 1.25 MG: 1.25 SOLUTION RESPIRATORY (INHALATION) at 08:44

## 2024-01-01 RX ADMIN — LIDOCAINE 5% 2 PATCH: 700 PATCH TOPICAL at 10:48

## 2024-01-01 RX ADMIN — LIDOCAINE 5% 2 PATCH: 700 PATCH TOPICAL at 08:24

## 2024-01-01 RX ADMIN — IPRATROPIUM BROMIDE 0.5 MG: 0.5 SOLUTION RESPIRATORY (INHALATION) at 19:57

## 2024-01-01 RX ADMIN — MENTHOL, UNSPECIFIED FORM AND METHYL SALICYLATE: 10; 150 CREAM TOPICAL at 10:38

## 2024-01-01 RX ADMIN — POLYETHYLENE GLYCOL 3350 17 G: 17 POWDER, FOR SOLUTION ORAL at 21:37

## 2024-01-01 RX ADMIN — LEVALBUTEROL HYDROCHLORIDE 1.25 MG: 1.25 SOLUTION RESPIRATORY (INHALATION) at 19:58

## 2024-01-01 RX ADMIN — ONDANSETRON: 2 INJECTION INTRAMUSCULAR; INTRAVENOUS at 08:13

## 2024-01-01 RX ADMIN — HEPARIN SODIUM 17 UNITS/KG/HR: 10000 INJECTION, SOLUTION INTRAVENOUS at 20:27

## 2024-01-01 RX ADMIN — SENNOSIDES, DOCUSATE SODIUM 2 TABLET: 8.6; 5 TABLET ORAL at 17:35

## 2024-01-01 RX ADMIN — CYANOCOBALAMIN TAB 500 MCG 1000 MCG: 500 TAB at 08:58

## 2024-01-01 RX ADMIN — ENOXAPARIN SODIUM 40 MG: 40 INJECTION SUBCUTANEOUS at 08:17

## 2024-01-01 RX ADMIN — IPRATROPIUM BROMIDE 0.5 MG: 0.5 SOLUTION RESPIRATORY (INHALATION) at 13:17

## 2024-01-01 RX ADMIN — HYDROMORPHONE HYDROCHLORIDE 4 MG: 4 TABLET ORAL at 08:48

## 2024-01-01 RX ADMIN — DEXAMETHASONE 4 MG: 4 TABLET ORAL at 08:59

## 2024-01-01 RX ADMIN — LIDOCAINE 5% 2 PATCH: 700 PATCH TOPICAL at 08:59

## 2024-01-01 RX ADMIN — IPRATROPIUM BROMIDE 0.5 MG: 0.5 SOLUTION RESPIRATORY (INHALATION) at 07:42

## 2024-01-01 RX ADMIN — HYDROMORPHONE HYDROCHLORIDE 8 MG: 4 TABLET ORAL at 12:05

## 2024-01-01 RX ADMIN — HYDROMORPHONE HYDROCHLORIDE 8 MG: 4 TABLET ORAL at 12:21

## 2024-01-01 RX ADMIN — IPRATROPIUM BROMIDE 0.5 MG: 0.5 SOLUTION RESPIRATORY (INHALATION) at 07:10

## 2024-01-01 RX ADMIN — LEVALBUTEROL HYDROCHLORIDE 1.25 MG: 1.25 SOLUTION RESPIRATORY (INHALATION) at 07:06

## 2024-01-01 RX ADMIN — LEVALBUTEROL HYDROCHLORIDE 1.25 MG: 1.25 SOLUTION RESPIRATORY (INHALATION) at 13:50

## 2024-01-01 RX ADMIN — LEVALBUTEROL HYDROCHLORIDE 1.25 MG: 1.25 SOLUTION RESPIRATORY (INHALATION) at 14:28

## 2024-01-01 RX ADMIN — HYDROMORPHONE HYDROCHLORIDE 8 MG: 4 TABLET ORAL at 11:58

## 2024-01-01 RX ADMIN — SENNOSIDES AND DOCUSATE SODIUM 1 TABLET: 8.6; 5 TABLET ORAL at 11:32

## 2024-01-01 RX ADMIN — CYANOCOBALAMIN TAB 500 MCG 1000 MCG: 500 TAB at 08:00

## 2024-01-01 RX ADMIN — GABAPENTIN 200 MG: 100 CAPSULE ORAL at 22:14

## 2024-01-01 RX ADMIN — METHYLPREDNISOLONE SODIUM SUCCINATE 40 MG: 40 INJECTION, POWDER, FOR SOLUTION INTRAMUSCULAR; INTRAVENOUS at 05:44

## 2024-01-01 RX ADMIN — LEVALBUTEROL HYDROCHLORIDE 1.25 MG: 1.25 SOLUTION RESPIRATORY (INHALATION) at 14:33

## 2024-01-01 RX ADMIN — ACETAMINOPHEN 975 MG: 325 TABLET, FILM COATED ORAL at 05:46

## 2024-01-01 RX ADMIN — POLYETHYLENE GLYCOL 3350 17 G: 17 POWDER, FOR SOLUTION ORAL at 08:50

## 2024-01-01 RX ADMIN — HYDROMORPHONE HYDROCHLORIDE 8 MG: 4 TABLET ORAL at 05:20

## 2024-01-01 RX ADMIN — IPRATROPIUM BROMIDE 0.5 MG: 0.5 SOLUTION RESPIRATORY (INHALATION) at 19:00

## 2024-01-01 RX ADMIN — LEVALBUTEROL HYDROCHLORIDE 1.25 MG: 1.25 SOLUTION RESPIRATORY (INHALATION) at 13:00

## 2024-01-01 RX ADMIN — SENNOSIDES, DOCUSATE SODIUM 2 TABLET: 8.6; 5 TABLET ORAL at 17:33

## 2024-01-01 RX ADMIN — GABAPENTIN 100 MG: 100 CAPSULE ORAL at 16:11

## 2024-01-01 RX ADMIN — HYDROMORPHONE HYDROCHLORIDE 6 MG: 2 TABLET ORAL at 08:17

## 2024-01-01 RX ADMIN — HYDROMORPHONE HYDROCHLORIDE 1 MG: 1 INJECTION, SOLUTION INTRAMUSCULAR; INTRAVENOUS; SUBCUTANEOUS at 18:02

## 2024-01-01 RX ADMIN — GABAPENTIN 200 MG: 100 CAPSULE ORAL at 13:57

## 2024-01-01 RX ADMIN — FUROSEMIDE 40 MG: 10 INJECTION, SOLUTION INTRAMUSCULAR; INTRAVENOUS at 14:50

## 2024-01-01 RX ADMIN — SENNOSIDES AND DOCUSATE SODIUM 1 TABLET: 8.6; 5 TABLET ORAL at 17:18

## 2024-01-01 RX ADMIN — HYDROMORPHONE HYDROCHLORIDE 8 MG: 4 TABLET ORAL at 05:58

## 2024-01-01 RX ADMIN — DEXMEDETOMIDINE HYDROCHLORIDE 0.1 MCG/KG/HR: 4 INJECTION, SOLUTION INTRAVENOUS at 16:54

## 2024-01-01 RX ADMIN — GABAPENTIN 200 MG: 100 CAPSULE ORAL at 21:02

## 2024-01-01 RX ADMIN — ACETAMINOPHEN 975 MG: 325 TABLET, FILM COATED ORAL at 13:00

## 2024-01-01 RX ADMIN — ACETAMINOPHEN 975 MG: 325 TABLET, FILM COATED ORAL at 21:16

## 2024-01-01 RX ADMIN — METHYLPREDNISOLONE SODIUM SUCCINATE 40 MG: 40 INJECTION, POWDER, FOR SOLUTION INTRAMUSCULAR; INTRAVENOUS at 20:23

## 2024-01-01 RX ADMIN — MENTHOL, UNSPECIFIED FORM AND METHYL SALICYLATE: 10; 150 CREAM TOPICAL at 16:31

## 2024-01-01 RX ADMIN — OXYCODONE HYDROCHLORIDE 10 MG: 10 TABLET ORAL at 22:12

## 2024-01-01 RX ADMIN — HEPARIN SODIUM 2400 UNITS: 1000 INJECTION INTRAVENOUS; SUBCUTANEOUS at 07:03

## 2024-01-01 RX ADMIN — HYDROMORPHONE HYDROCHLORIDE 8 MG: 4 TABLET ORAL at 17:32

## 2024-01-01 RX ADMIN — DEXAMETHASONE 4 MG: 4 TABLET ORAL at 08:01

## 2024-01-01 RX ADMIN — HEPARIN SODIUM 17 UNITS/KG/HR: 10000 INJECTION, SOLUTION INTRAVENOUS at 21:54

## 2024-01-01 RX ADMIN — HYDROMORPHONE HYDROCHLORIDE 6 MG: 2 TABLET ORAL at 20:24

## 2024-01-01 RX ADMIN — IPRATROPIUM BROMIDE 0.5 MG: 0.5 SOLUTION RESPIRATORY (INHALATION) at 20:41

## 2024-01-01 RX ADMIN — POLYETHYLENE GLYCOL 3350 17 G: 17 POWDER, FOR SOLUTION ORAL at 10:14

## 2024-01-01 RX ADMIN — HEPARIN SODIUM 17 UNITS/KG/HR: 10000 INJECTION, SOLUTION INTRAVENOUS at 22:15

## 2024-01-01 RX ADMIN — HYDROMORPHONE HYDROCHLORIDE 0.2 MG: 0.2 INJECTION, SOLUTION INTRAMUSCULAR; INTRAVENOUS; SUBCUTANEOUS at 20:37

## 2024-01-01 RX ADMIN — POLYETHYLENE GLYCOL 3350 17 G: 17 POWDER, FOR SOLUTION ORAL at 21:56

## 2024-01-01 RX ADMIN — HEPARIN SODIUM 2400 UNITS: 1000 INJECTION INTRAVENOUS; SUBCUTANEOUS at 07:47

## 2024-01-01 RX ADMIN — MENTHOL, UNSPECIFIED FORM AND METHYL SALICYLATE: 10; 150 CREAM TOPICAL at 09:01

## 2024-01-01 RX ADMIN — HYDROMORPHONE HYDROCHLORIDE 6 MG: 2 TABLET ORAL at 05:09

## 2024-01-01 RX ADMIN — FOLIC ACID 1 MG: 1 TABLET ORAL at 08:59

## 2024-01-01 RX ADMIN — LEVALBUTEROL HYDROCHLORIDE 1.25 MG: 1.25 SOLUTION RESPIRATORY (INHALATION) at 07:10

## 2024-01-01 RX ADMIN — IPRATROPIUM BROMIDE 0.5 MG: 0.5 SOLUTION RESPIRATORY (INHALATION) at 13:00

## 2024-01-01 RX ADMIN — LIDOCAINE 5% 2 PATCH: 700 PATCH TOPICAL at 07:54

## 2024-01-01 RX ADMIN — HYDROMORPHONE HYDROCHLORIDE 4 MG: 4 TABLET ORAL at 05:11

## 2024-01-01 RX ADMIN — PEMETREXED DISODIUM 688 MG: 500 INJECTION, POWDER, LYOPHILIZED, FOR SOLUTION INTRAVENOUS at 09:32

## 2024-01-01 RX ADMIN — ACETAMINOPHEN 975 MG: 325 TABLET, FILM COATED ORAL at 05:09

## 2024-01-03 ENCOUNTER — APPOINTMENT (OUTPATIENT)
Dept: RADIOLOGY | Facility: MEDICAL CENTER | Age: 64
End: 2024-01-03
Payer: COMMERCIAL

## 2024-01-03 ENCOUNTER — OFFICE VISIT (OUTPATIENT)
Dept: FAMILY MEDICINE CLINIC | Facility: CLINIC | Age: 64
End: 2024-01-03
Payer: COMMERCIAL

## 2024-01-03 ENCOUNTER — APPOINTMENT (OUTPATIENT)
Dept: LAB | Facility: MEDICAL CENTER | Age: 64
End: 2024-01-03
Payer: COMMERCIAL

## 2024-01-03 VITALS
TEMPERATURE: 98.3 F | BODY MASS INDEX: 21.22 KG/M2 | HEART RATE: 48 BPM | SYSTOLIC BLOOD PRESSURE: 130 MMHG | HEIGHT: 68 IN | OXYGEN SATURATION: 89 % | RESPIRATION RATE: 16 BRPM | DIASTOLIC BLOOD PRESSURE: 74 MMHG | WEIGHT: 140 LBS

## 2024-01-03 DIAGNOSIS — G89.29 NECK PAIN, CHRONIC: Primary | ICD-10-CM

## 2024-01-03 DIAGNOSIS — R05.3 CHRONIC COUGH: ICD-10-CM

## 2024-01-03 DIAGNOSIS — G89.29 NECK PAIN, CHRONIC: ICD-10-CM

## 2024-01-03 DIAGNOSIS — M54.2 NECK PAIN, CHRONIC: ICD-10-CM

## 2024-01-03 DIAGNOSIS — F17.200 CURRENT EVERY DAY SMOKER: ICD-10-CM

## 2024-01-03 DIAGNOSIS — M54.2 NECK PAIN, CHRONIC: Primary | ICD-10-CM

## 2024-01-03 PROBLEM — J40 BRONCHITIS: Status: RESOLVED | Noted: 2023-10-26 | Resolved: 2024-01-03

## 2024-01-03 LAB
ALBUMIN SERPL BCP-MCNC: 4.1 G/DL (ref 3.5–5)
ALP SERPL-CCNC: 69 U/L (ref 34–104)
ALT SERPL W P-5'-P-CCNC: 11 U/L (ref 7–52)
ANION GAP SERPL CALCULATED.3IONS-SCNC: 10 MMOL/L
AST SERPL W P-5'-P-CCNC: 19 U/L (ref 13–39)
BASOPHILS # BLD AUTO: 0.05 THOUSANDS/ÂΜL (ref 0–0.1)
BASOPHILS NFR BLD AUTO: 1 % (ref 0–1)
BILIRUB SERPL-MCNC: 0.53 MG/DL (ref 0.2–1)
BUN SERPL-MCNC: 12 MG/DL (ref 5–25)
CALCIUM SERPL-MCNC: 9.5 MG/DL (ref 8.4–10.2)
CHLORIDE SERPL-SCNC: 97 MMOL/L (ref 96–108)
CO2 SERPL-SCNC: 28 MMOL/L (ref 21–32)
CREAT SERPL-MCNC: 0.84 MG/DL (ref 0.6–1.3)
EOSINOPHIL # BLD AUTO: 0.22 THOUSAND/ÂΜL (ref 0–0.61)
EOSINOPHIL NFR BLD AUTO: 3 % (ref 0–6)
ERYTHROCYTE [DISTWIDTH] IN BLOOD BY AUTOMATED COUNT: 13.4 % (ref 11.6–15.1)
GFR SERPL CREATININE-BSD FRML MDRD: 93 ML/MIN/1.73SQ M
GLUCOSE SERPL-MCNC: 94 MG/DL (ref 65–140)
HCT VFR BLD AUTO: 39.4 % (ref 36.5–49.3)
HGB BLD-MCNC: 13.2 G/DL (ref 12–17)
IMM GRANULOCYTES # BLD AUTO: 0.03 THOUSAND/UL (ref 0–0.2)
IMM GRANULOCYTES NFR BLD AUTO: 0 % (ref 0–2)
LYMPHOCYTES # BLD AUTO: 1.71 THOUSANDS/ÂΜL (ref 0.6–4.47)
LYMPHOCYTES NFR BLD AUTO: 22 % (ref 14–44)
MCH RBC QN AUTO: 30.9 PG (ref 26.8–34.3)
MCHC RBC AUTO-ENTMCNC: 33.5 G/DL (ref 31.4–37.4)
MCV RBC AUTO: 92 FL (ref 82–98)
MONOCYTES # BLD AUTO: 0.68 THOUSAND/ÂΜL (ref 0.17–1.22)
MONOCYTES NFR BLD AUTO: 9 % (ref 4–12)
NEUTROPHILS # BLD AUTO: 5.05 THOUSANDS/ÂΜL (ref 1.85–7.62)
NEUTS SEG NFR BLD AUTO: 65 % (ref 43–75)
NRBC BLD AUTO-RTO: 0 /100 WBCS
PLATELET # BLD AUTO: 241 THOUSANDS/UL (ref 149–390)
PMV BLD AUTO: 9 FL (ref 8.9–12.7)
POTASSIUM SERPL-SCNC: 4.1 MMOL/L (ref 3.5–5.3)
PROT SERPL-MCNC: 7 G/DL (ref 6.4–8.4)
RBC # BLD AUTO: 4.27 MILLION/UL (ref 3.88–5.62)
SODIUM SERPL-SCNC: 135 MMOL/L (ref 135–147)
WBC # BLD AUTO: 7.74 THOUSAND/UL (ref 4.31–10.16)

## 2024-01-03 PROCEDURE — 72050 X-RAY EXAM NECK SPINE 4/5VWS: CPT

## 2024-01-03 PROCEDURE — 85025 COMPLETE CBC W/AUTO DIFF WBC: CPT

## 2024-01-03 PROCEDURE — 80053 COMPREHEN METABOLIC PANEL: CPT

## 2024-01-03 PROCEDURE — 36415 COLL VENOUS BLD VENIPUNCTURE: CPT

## 2024-01-03 PROCEDURE — 99214 OFFICE O/P EST MOD 30 MIN: CPT | Performed by: INTERNAL MEDICINE

## 2024-01-03 PROCEDURE — 71046 X-RAY EXAM CHEST 2 VIEWS: CPT

## 2024-01-03 RX ORDER — CYCLOBENZAPRINE HCL 10 MG
10 TABLET ORAL 3 TIMES DAILY PRN
Qty: 30 TABLET | Refills: 0 | Status: SHIPPED | OUTPATIENT
Start: 2024-01-03

## 2024-01-03 RX ORDER — PREDNISONE 20 MG/1
TABLET ORAL
Qty: 18 TABLET | Refills: 0 | Status: SHIPPED | OUTPATIENT
Start: 2024-01-03

## 2024-01-03 NOTE — ASSESSMENT & PLAN NOTE
Patient is having neck pain the radiates into his shoulder keeps him awake at night frequently. Has insurance so will get x-rays and blood work

## 2024-01-03 NOTE — PROGRESS NOTES
Name: Abdulaziz Gomez      : 1960      MRN: [id number]  Encounter Provider: Genesis Leonard MD  Encounter Date: 1/3/2024   Encounter department: [facility]    Assessment & Plan     1. Neck pain, chronic  Assessment & Plan:  Patient is having neck pain the radiates into his shoulder keeps him awake at night frequently. Has insurance so will get x-rays and blood work    Orders:  -     XR spine cervical complete 4 or 5 vw non injury; Future; Expected date: 2024  -     predniSONE 20 mg tablet; 1 Po TID X 3 days then 1 PO BID X 3 days then 1 PO daily X 3 days  -     cyclobenzaprine (FLEXERIL) 10 mg tablet; Take 1 tablet (10 mg total) by mouth 3 (three) times a day as needed for muscle spasms  -     CBC and differential; Future  -     Comprehensive metabolic panel; Future    2. Chronic cough  Assessment & Plan:  He does have a smokers cough so will also check a chest x-ray    Orders:  -     XR chest pa & lateral; Future; Expected date: 2024  -     CBC and differential; Future  -     Comprehensive metabolic panel; Future    3. Current every day smoker  Assessment & Plan:  He is cut back dramatically on his smoking but unfortunately has a long history of being is             Subjective      Sore Throat   Associated symptoms include neck pain. Pertinent negatives include no abdominal pain, coughing, ear pain, shortness of breath or vomiting.   Neck Pain   This is a chronic problem. The current episode started more than 1 month ago. The problem occurs daily. The problem has been gradually worsening. The pain is associated with an unknown factor. The pain is present in the right side. The quality of the pain is described as burning and shooting. The pain is at a severity of 7/10. The pain is moderate. The symptoms are aggravated by position. The pain is Worse during the night. Pertinent negatives include no chest pain or fever. Associated symptoms comments: Right shoulder pain. He has tried  "acetaminophen, chiropractic manipulation and NSAIDs for the symptoms. The treatment provided no relief.     Review of Systems   Constitutional:  Negative for chills and fever.   HENT:  Negative for ear pain and sore throat.    Eyes:  Negative for pain and visual disturbance.   Respiratory:  Negative for cough and shortness of breath.    Cardiovascular:  Negative for chest pain and palpitations.   Gastrointestinal:  Negative for abdominal pain and vomiting.   Genitourinary:  Negative for dysuria and hematuria.   Musculoskeletal:  Positive for neck pain. Negative for arthralgias and back pain.        R shoulder pain   Skin:  Negative for color change and rash.   Neurological:  Negative for seizures and syncope.   All other systems reviewed and are negative.      Current Outpatient Medications on File Prior to Visit   Medication Sig    albuterol (Proventil HFA) 90 mcg/act inhaler Inhale 2 puffs every 6 (six) hours as needed for wheezing (Patient not taking: Reported on 1/3/2024)    [DISCONTINUED] methylPREDNISolone 4 MG tablet therapy pack Use as directed on package (Patient not taking: Reported on 1/3/2024)       Objective     /74 (BP Location: Right arm, Patient Position: Sitting, Cuff Size: Large)   Pulse (!) 48   Temp 98.3 °F (36.8 °C) (Tympanic)   Resp 16   Ht 5' 8\" (1.727 m)   Wt 63.5 kg (140 lb)   SpO2 (!) 89%   BMI 21.29 kg/m²     Physical Exam  Constitutional:       General: He is not in acute distress.     Appearance: He is well-developed.   HENT:      Right Ear: External ear normal.      Left Ear: External ear normal.      Nose: Nose normal.      Mouth/Throat:      Pharynx: No oropharyngeal exudate.   Eyes:      Pupils: Pupils are equal, round, and reactive to light.   Neck:      Thyroid: No thyromegaly.      Vascular: No JVD.   Cardiovascular:      Rate and Rhythm: Normal rate and regular rhythm.      Heart sounds: Normal heart sounds. No murmur heard.     No gallop.   Pulmonary:      Effort: " Pulmonary effort is normal. No respiratory distress.      Breath sounds: Normal breath sounds. No wheezing or rales.   Abdominal:      General: Bowel sounds are normal. There is no distension.      Palpations: Abdomen is soft. There is no mass.      Tenderness: There is no abdominal tenderness.   Musculoskeletal:         General: No tenderness. Normal range of motion.      Cervical back: Normal range of motion and neck supple.   Lymphadenopathy:      Cervical: No cervical adenopathy.   Skin:     Findings: No rash.   Neurological:      General: No focal deficit present.      Mental Status: He is alert and oriented to person, place, and time.      Cranial Nerves: No cranial nerve deficit.      Coordination: Coordination normal.   Psychiatric:         Mood and Affect: Mood normal.         Behavior: Behavior normal.         Thought Content: Thought content normal.         Judgment: Judgment normal.       Genesis Leonard MD

## 2024-01-03 NOTE — LETTER
[facility]  [address] [place] [place] [address]      January 8, 2024    MRN: [id number]     Phone: [phone]     Dear  Patricia,    You recently had a(n) Diagnostic Imaging performed on 1/3/2024 at  [facility] that was requested by Genesis Leonard MD. The study was reviewed by a radiologist, which is a physician who specializes in medical imaging. The radiologist issued a report describing his or her findings. In that report there was a finding that the radiologist felt warranted further discussion with your health care provider and that discussion would be beneficial to you.      The results were sent to Genesis Leonard MD on 01/03/2024  8:15 PM. We recommend that you contact Genesis Leonard MD at [phone] or set up an appointment to discuss the results of the imaging test. If you have already heard from Genesis Leonard MD regarding the results of your study, you can disregard this letter.     This letter is not meant to alarm you, but intended to encourage you to follow-up on your results with the provider that sent you for the imaging study. In addition, we have enclosed answers to frequently asked questions by other patients who have also received a letter to review results with their health care provider (see page two).      Thank you for choosing [facility] for your medical imaging needs.                                                                                                                                                        FREQUENTLY ASKED QUESTIONS    Why am I receiving this letter?  [place] State Law requires us to notify patients who have findings on imaging exams that may require more testing or follow-up with a health professional within the next 3 months.        How serious is the finding on the imaging test?  This letter is sent to all patients who may need follow-up or more testing within the next 3 months.   Receiving this letter does not necessarily mean you have a life-threatening imaging finding or disease.  Recommendations in the radiologist’s imaging report are general in nature and it is up to your healthcare provider to say whether those recommendations make sense for your situation.  You are strongly encouraged to talk to your health care provider about the results and ask whether additional steps need to be taken.    Where can I get a copy of the final report for my recent radiology exam?  To get a full copy of the report you can access your records online at [email or web address] or please contact [facility]’s Medical Records Department at [phone] Monday through Friday between 8 am and 6 pm.         What do I need to do now?           Please contact your health care provider who requested the imaging study to discuss what further actions (if any) are needed.  You may have already heard from (your ordering provider) in regard to this test in which case you can disregard this letter.        NOTICE IN ACCORDANCE WITH THE [place] STATE “PATIENT TEST RESULT INFORMATION ACT OF 2018”    You are receiving this notice as a result of a determination by your diagnostic imaging service that further discussions of your test results are warranted and would be beneficial to you.    The complete results of your test or tests have been or will be sent to the health care practitioner that ordered the test or tests. It is recommended that you contact your health care practitioner to discuss your results as soon as possible.

## 2024-01-04 ENCOUNTER — TELEPHONE (OUTPATIENT)
Age: 64
End: 2024-01-04

## 2024-01-04 DIAGNOSIS — R91.8 MASS OF RIGHT LUNG: Primary | ICD-10-CM

## 2024-01-11 ENCOUNTER — HOSPITAL ENCOUNTER (OUTPATIENT)
Dept: RADIOLOGY | Facility: MEDICAL CENTER | Age: 64
Discharge: HOME | End: 2024-01-11
Payer: COMMERCIAL

## 2024-01-11 DIAGNOSIS — R22.2 CHEST MASS: Primary | ICD-10-CM

## 2024-01-11 DIAGNOSIS — R91.8 MASS OF RIGHT LUNG: ICD-10-CM

## 2024-01-11 DIAGNOSIS — M89.8X9 BONE MASS: Primary | ICD-10-CM

## 2024-01-11 PROCEDURE — 71260 CT THORAX DX C+: CPT

## 2024-01-11 PROCEDURE — G1004 CDSM NDSC: HCPCS

## 2024-01-11 RX ADMIN — IOHEXOL 85 ML: 350 INJECTION, SOLUTION INTRAVENOUS at 10:42

## 2024-01-16 NOTE — PRE-PROCEDURE INSTRUCTIONS
Pre-procedure Instructions for Interventional Radiology  [address]   [address]  INTERVENTIONAL RADIOLOGY [phone]    You are scheduled for a/an Sternal Mass Biopsy.    On Tuesday 1/23/24.    Your arrival time is 1PM.  Our Interventional Radiology nurse will notify you a few days before your procedure with the exact arrival time and location to arrive.    To prepare for your procedure:  Please arrange for someone to drive you home after the procedure and stay with you until the next morning if you are instructed to do so.  This is typically for patients receiving some type of sedative or anesthetic for the procedure.  DO NOT EAT OR DRINK ANYTHING after midnight on the evening before your procedure including candy & gum.  ONLY SIPS OF WATER with your medications are allowed on the morning of your procedure.  TAKE ALL OF YOUR REGULAR MEDICATIONS THE MORNING OF YOUR PROCEDURE with sips of water!  We may call you to stop some of your blood sugar, blood pressure and blood thinning medications depending on the procedure.  Please take all of these medications unless we instruct you to stop them.  If you have an allergy to x-ray dye, please contact Interventional Radiology for an x-ray dye preparation which usually consists of an oral steroid and Benadryl.    The day of your procedure:  Bring a list of the medications you take at home.  Bring medications you take for breathing problems (such as inhalers), medications for chest pain, or both.  Bring your insurance card and a form of photo ID.  Bring a case for your glasses or contacts.  Please leave all valuables such as credit cards and jewelry at home.  Report to the registration desk in the main lobby at the [facility].  Ask to be directed to the After Procedure Unit on the 2nd floor.  While your procedure is being performed your family may wait in the Waiting Room on the 2nd floor.  Be prepared to stay overnight just in  case. Sometimes procedures will indicate the need for further observation or treatment.   If you are scheduled for a follow-up visit with the Interventional Radiologist after your procedure, you will be called with a date and time.    Special Instructions (Medications to alter or stop taking before your procedure etc.):

## 2024-01-22 ENCOUNTER — TELEPHONE (OUTPATIENT)
Age: 64
End: 2024-01-22

## 2024-01-22 DIAGNOSIS — K76.9 LESION OF LIVER: Primary | ICD-10-CM

## 2024-01-22 NOTE — TELEPHONE ENCOUNTER
Tawny from Radiology calling for clarification MRI order. She states the imaging was ordered for abdomen and pelvis, and the imaging is for a liver lesion. She states it only needs to be the abdomen. She wants to know if this can be changed. Please follow up.  Radiology [phone]

## 2024-01-23 ENCOUNTER — HOSPITAL ENCOUNTER (OUTPATIENT)
Dept: CT IMAGING | Facility: HOSPITAL | Age: 64
Discharge: HOME | End: 2024-01-23
Attending: RADIOLOGY
Payer: COMMERCIAL

## 2024-01-23 VITALS
BODY MASS INDEX: 19.45 KG/M2 | TEMPERATURE: 97.9 F | RESPIRATION RATE: 15 BRPM | SYSTOLIC BLOOD PRESSURE: 137 MMHG | DIASTOLIC BLOOD PRESSURE: 79 MMHG | HEART RATE: 87 BPM | OXYGEN SATURATION: 95 % | HEIGHT: 69 IN | WEIGHT: 131.3 LBS

## 2024-01-23 DIAGNOSIS — M89.8X9 BONE MASS: ICD-10-CM

## 2024-01-23 LAB
INR PPP: 1.13 (ref 0.84–1.19)
PROTHROMBIN TIME: 14.3 SECONDS (ref 11.6–14.5)

## 2024-01-23 PROCEDURE — 77012 CT SCAN FOR NEEDLE BIOPSY: CPT | Performed by: RADIOLOGY

## 2024-01-23 PROCEDURE — 99152 MOD SED SAME PHYS/QHP 5/>YRS: CPT | Performed by: RADIOLOGY

## 2024-01-23 PROCEDURE — 88305 TISSUE EXAM BY PATHOLOGIST: CPT | Performed by: PATHOLOGY

## 2024-01-23 PROCEDURE — 85610 PROTHROMBIN TIME: CPT | Performed by: RADIOLOGY

## 2024-01-23 PROCEDURE — 88341 IMHCHEM/IMCYTCHM EA ADD ANTB: CPT | Performed by: PATHOLOGY

## 2024-01-23 PROCEDURE — 88342 IMHCHEM/IMCYTCHM 1ST ANTB: CPT | Performed by: PATHOLOGY

## 2024-01-23 PROCEDURE — 20220 BONE BIOPSY TROCAR/NDL SUPFC: CPT | Performed by: RADIOLOGY

## 2024-01-23 RX ORDER — FENTANYL CITRATE 50 UG/ML
INJECTION, SOLUTION INTRAMUSCULAR; INTRAVENOUS AS NEEDED
Status: COMPLETED | OUTPATIENT
Start: 2024-01-23 | End: 2024-01-23

## 2024-01-23 RX ORDER — SODIUM CHLORIDE 9 MG/ML
75 INJECTION, SOLUTION INTRAVENOUS CONTINUOUS
Status: DISCONTINUED | OUTPATIENT
Start: 2024-01-23 | End: 2024-01-27 | Stop reason: HOSPADM

## 2024-01-23 RX ORDER — MIDAZOLAM HYDROCHLORIDE 2 MG/2ML
INJECTION, SOLUTION INTRAMUSCULAR; INTRAVENOUS AS NEEDED
Status: COMPLETED | OUTPATIENT
Start: 2024-01-23 | End: 2024-01-23

## 2024-01-23 RX ORDER — LIDOCAINE HYDROCHLORIDE 10 MG/ML
INJECTION, SOLUTION EPIDURAL; INFILTRATION; INTRACAUDAL; PERINEURAL AS NEEDED
Status: COMPLETED | OUTPATIENT
Start: 2024-01-23 | End: 2024-01-23

## 2024-01-23 RX ADMIN — MIDAZOLAM HYDROCHLORIDE 0.5 MG: 1 INJECTION, SOLUTION INTRAMUSCULAR; INTRAVENOUS at 14:46

## 2024-01-23 RX ADMIN — MIDAZOLAM HYDROCHLORIDE 0.5 MG: 1 INJECTION, SOLUTION INTRAMUSCULAR; INTRAVENOUS at 14:53

## 2024-01-23 RX ADMIN — MIDAZOLAM HYDROCHLORIDE 0.5 MG: 1 INJECTION, SOLUTION INTRAMUSCULAR; INTRAVENOUS at 14:34

## 2024-01-23 RX ADMIN — FENTANYL CITRATE 25 MCG: 50 INJECTION, SOLUTION INTRAMUSCULAR; INTRAVENOUS at 14:46

## 2024-01-23 RX ADMIN — FENTANYL CITRATE 25 MCG: 50 INJECTION, SOLUTION INTRAMUSCULAR; INTRAVENOUS at 14:53

## 2024-01-23 RX ADMIN — FENTANYL CITRATE 25 MCG: 50 INJECTION, SOLUTION INTRAMUSCULAR; INTRAVENOUS at 14:34

## 2024-01-23 RX ADMIN — LIDOCAINE HYDROCHLORIDE 10 ML: 10 INJECTION, SOLUTION EPIDURAL; INFILTRATION; INTRACAUDAL at 14:41

## 2024-01-23 NOTE — LETTER
[facility] CT SCAN  [address] [address]  Dept: [phone]    January 23, 2024     Patient: Abdulaziz Gomez   YOB: 1960   Date of Visit: 1/23/2024       To Whom it May Concern:    Abdulaziz Gomez is under my professional care. He was seen in the hospital from 1/23/2024 to 01/23/24. He may return to work on 1/25/2024 without limitations.    If you have any questions or concerns, please don't hesitate to call.         Sincerely,          Jhonatan Tang MD

## 2024-01-23 NOTE — BRIEF OP NOTE (RAD/CATH)
INTERVENTIONAL RADIOLOGY PROCEDURE NOTE    Date: 1/23/2024    Procedure:   Procedure Summary       Date: 01/23/24 Room / Location: [facility] CT Scan    Anesthesia Start:  Anesthesia Stop:     Procedure: IR BIOPSY BONE Diagnosis:       Bone mass      (Sternal mass extending into mediastinum)    Scheduled Providers:  Responsible Provider:     Anesthesia Type: Not recorded ASA Status: Not recorded            Preoperative diagnosis:   1. Bone mass         Postoperative diagnosis: Same.    Surgeon: Jhonatan Tang MD     Assistant: None. No qualified resident was available.    Blood loss: 20 mL    Specimens: none     Findings: successful biopsy of a destructive sternal lesion.  Specimen sent in formalin and rpmi    Complications: None immediate.    Anesthesia: conscious sedation

## 2024-01-23 NOTE — SEDATION DOCUMENTATION
Pt tolerated sternum bone mass biopsy. Vitals stable. Access sites slight bleeding during procedure. Sites closed with exofin. Dry dressing placed in APU. Pt recovering for 1hr.

## 2024-01-23 NOTE — DISCHARGE INSTRUCTIONS
POST BIOPSY    Care after your procedure:    1. Limit your activities for 24 hours after your biopsy.    2. No driving day of biopsy.    3. Return to your normal diet.Small sips of flat soda will help with mild nausea.    4. Remove band-aid or dressing 24 hours after procedure.     Contact Interventional Radiology at [phone] ([place] PATIENTS: Contact Interventional Radiology at [phone]) ([place] PATIENTS: Contact Interventional Radiology at [phone]) if:    1. Difficulty breathing, nausea or vomiting.    2. Chills or fever above 101 degrees F.     3. Pain at biopsy site not relieved by medication.     4. Develop any redness, swelling, heat, unusual drainage, heavy bruising or bleeding from biopsy site.     Procedural Sedation   WHAT YOU NEED TO KNOW:   Procedural sedation is medicine used during procedures to help you feel relaxed and calm. You will remember little to none of the procedure. After sedation you may feel tired, weak, or unsteady on your feet. You may also have trouble concentrating or short-term memory loss. These symptoms should go away in 24 hours or less.   DISCHARGE INSTRUCTIONS:   Call 911 or have someone else call for any of the following:   You have sudden trouble breathing.     You cannot be woken.     Contact Interventional Radiology at [phone]   [place] PATIENTS: Contact Interventional Radiology at [phone] [place] PATIENTS: Contact Interventional Radiology at [phone]) if any of the following occur:      You have a severe headache or dizziness.     Your heart is beating faster than usual.    You have a fever or chills.     Your skin is itchy, swollen, or you have a rash.     You have nausea or are vomiting for more than 8 hours after the procedure.      You have questions or concerns about your condition or care.  Self-care:   Have someone stay with you for 24 hours. This person can drive you to errands and help you do things around the house. This person can  also watch for problems.      Rest and do quiet activities for 24 hours. Do not exercise, ride a bike, or play sports. Stand up slowly to prevent dizziness and falls. Take short walks around the house with another person. Slowly return to your usual activities the next day.      Do not drive or use dangerous machines or tools for 24 hours. You may injure yourself or others. Examples include a lawnmower, saw, or drill. Do not return to work for 24 hours if you use dangerous machines or tools for work.      Do not make important decisions for 24 hours. For example, do not sign important papers or invest money.      Drink liquids as directed. Liquids help flush the sedation medicine out of your body. Ask how much liquid to drink each day and which liquids are best for you.      Eat small, frequent meals to prevent nausea and vomiting. Start with clear liquids such as juice or broth. If you do not vomit after clear liquids, you can eat your usual foods.      Do not drink alcohol or take medicines that make you drowsy. This includes medicines that help you sleep and anxiety medicines. Ask your healthcare provider if it is safe for you to take pain medicine.  Follow up with your healthcare provider as directed: Write down your questions so you remember to ask them during your visits.

## 2024-01-24 ENCOUNTER — TELEPHONE (OUTPATIENT)
Dept: FAMILY MEDICINE CLINIC | Facility: CLINIC | Age: 64
End: 2024-01-24

## 2024-01-24 DIAGNOSIS — M89.8X9 BONE PAIN: Primary | ICD-10-CM

## 2024-01-24 RX ORDER — OXYCODONE HYDROCHLORIDE AND ACETAMINOPHEN 5; 325 MG/1; MG/1
1 TABLET ORAL EVERY 6 HOURS PRN
Qty: 60 TABLET | Refills: 0 | Status: SHIPPED | OUTPATIENT
Start: 2024-01-24

## 2024-01-25 DIAGNOSIS — M89.8X9 BONE PAIN: ICD-10-CM

## 2024-01-25 DIAGNOSIS — K76.9 LIVER LESION: Primary | ICD-10-CM

## 2024-01-25 DIAGNOSIS — C80.1 CANCER (HCC): ICD-10-CM

## 2024-01-26 DIAGNOSIS — C79.51 NON-SMALL CELL LUNG CANCER METASTATIC TO BONE (HCC): Primary | ICD-10-CM

## 2024-01-26 DIAGNOSIS — C34.90 NON-SMALL CELL LUNG CANCER METASTATIC TO BONE (HCC): Primary | ICD-10-CM

## 2024-01-26 PROCEDURE — 88305 TISSUE EXAM BY PATHOLOGIST: CPT | Performed by: PATHOLOGY

## 2024-01-26 PROCEDURE — 88341 IMHCHEM/IMCYTCHM EA ADD ANTB: CPT | Performed by: PATHOLOGY

## 2024-01-26 PROCEDURE — 88342 IMHCHEM/IMCYTCHM 1ST ANTB: CPT | Performed by: PATHOLOGY

## 2024-01-27 ENCOUNTER — HOSPITAL ENCOUNTER (OUTPATIENT)
Dept: RADIOLOGY | Facility: HOSPITAL | Age: 64
Discharge: HOME | End: 2024-01-27
Payer: COMMERCIAL

## 2024-01-27 ENCOUNTER — HOSPITAL ENCOUNTER (OUTPATIENT)
Dept: RADIOLOGY | Facility: HOSPITAL | Age: 64
Discharge: HOME | End: 2024-01-27
Attending: INTERNAL MEDICINE
Payer: COMMERCIAL

## 2024-01-27 DIAGNOSIS — M89.8X9 BONE PAIN: ICD-10-CM

## 2024-01-27 DIAGNOSIS — C80.1 CANCER (HCC): ICD-10-CM

## 2024-01-27 DIAGNOSIS — K76.9 LIVER LESION: ICD-10-CM

## 2024-01-27 PROCEDURE — G1004 CDSM NDSC: HCPCS

## 2024-01-27 PROCEDURE — 74183 MRI ABD W/O CNTR FLWD CNTR: CPT

## 2024-01-27 PROCEDURE — A9585 GADOBUTROL INJECTION: HCPCS | Performed by: INTERNAL MEDICINE

## 2024-01-27 RX ORDER — GADOBUTROL 604.72 MG/ML
6 INJECTION INTRAVENOUS
Status: COMPLETED | OUTPATIENT
Start: 2024-01-27 | End: 2024-01-27

## 2024-01-27 RX ADMIN — GADOBUTROL 6 ML: 604.72 INJECTION INTRAVENOUS at 10:04

## 2024-02-01 ENCOUNTER — DOCUMENTATION (OUTPATIENT)
Dept: HEMATOLOGY ONCOLOGY | Facility: CLINIC | Age: 64
End: 2024-02-01

## 2024-02-01 ENCOUNTER — RADIATION ONCOLOGY CONSULT (OUTPATIENT)
Dept: RADIATION ONCOLOGY | Facility: HOSPITAL | Age: 64
End: 2024-02-01
Attending: INTERNAL MEDICINE
Payer: COMMERCIAL

## 2024-02-01 ENCOUNTER — PATIENT OUTREACH (OUTPATIENT)
Dept: HEMATOLOGY ONCOLOGY | Facility: CLINIC | Age: 64
End: 2024-02-01

## 2024-02-01 ENCOUNTER — PATIENT OUTREACH (OUTPATIENT)
Dept: OTHER | Facility: CLINIC | Age: 64
End: 2024-02-01

## 2024-02-01 ENCOUNTER — TELEPHONE (OUTPATIENT)
Dept: HEMATOLOGY ONCOLOGY | Facility: CLINIC | Age: 64
End: 2024-02-01

## 2024-02-01 VITALS
TEMPERATURE: 98 F | WEIGHT: 133 LBS | OXYGEN SATURATION: 92 % | RESPIRATION RATE: 18 BRPM | SYSTOLIC BLOOD PRESSURE: 140 MMHG | BODY MASS INDEX: 19.64 KG/M2 | HEART RATE: 92 BPM | DIASTOLIC BLOOD PRESSURE: 82 MMHG

## 2024-02-01 DIAGNOSIS — C79.51 NON-SMALL CELL LUNG CANCER METASTATIC TO BONE (HCC): Primary | ICD-10-CM

## 2024-02-01 DIAGNOSIS — M25.551 PAIN OF RIGHT HIP: Primary | ICD-10-CM

## 2024-02-01 DIAGNOSIS — C79.51 METASTATIC MALIGNANT NEOPLASM TO STERNUM (HCC): Primary | ICD-10-CM

## 2024-02-01 DIAGNOSIS — C34.90 NON-SMALL CELL LUNG CANCER METASTATIC TO BONE (HCC): Primary | ICD-10-CM

## 2024-02-01 LAB — SCAN RESULT: NORMAL

## 2024-02-01 PROCEDURE — 99245 OFF/OP CONSLTJ NEW/EST HI 55: CPT | Performed by: INTERNAL MEDICINE

## 2024-02-01 PROCEDURE — 99211 OFF/OP EST MAY X REQ PHY/QHP: CPT | Performed by: INTERNAL MEDICINE

## 2024-02-01 PROCEDURE — G0463 HOSPITAL OUTPT CLINIC VISIT: HCPCS | Performed by: INTERNAL MEDICINE

## 2024-02-01 RX ORDER — HYDROMORPHONE HYDROCHLORIDE 8 MG/1
1 TABLET, EXTENDED RELEASE ORAL DAILY
Qty: 30 TABLET | Refills: 0 | Status: ON HOLD | OUTPATIENT
Start: 2024-02-01

## 2024-02-01 NOTE — PROGRESS NOTES
Abdulaziz Gomez 1960 is a 63 y.o. male presents for radiation oncology consult for sternal metastasis, referred by Dr. Genesis Leonard.     Patient has history of smoking, he presented to [facility] on 1/3/24 with neck pain radiating to his shoulder, he had XR chest that revealed 7 cm mass in right paratracheal region. XR cervical spine revealed no acute osseous abnormality. He underwent CT chest and biopsy of sternum that revealed metastatic non-small cell carcinoma. He is referred by [facility] to Rad Onc and Med Onc. No appts are scheduled with Medical Oncology yet.      1/3/24 XR chest pa & lateral   7 cm masslike opacity in the high right paratracheal region. Recommend further evaluation with a chest CT with contrast to exclude malignancy.   Small right and trace left effusion.      1/11/24 CT chest w contrast  1. There is a destructive mass centered in the right upper-anterior mediastinum, with resultant destruction of the manubrium and sternum, and adjacent right medial pleural thickening that is likely involved. The mass also circumscribes and narrows the right brachiocephalic vein, SVC and left brachiocephalic vein.  The mass extends inferiorly and posteriorly to involve the right paratracheal and subcarinal mediastinum, as well as the right hilum. Findings are highly suspicious for malignant involvement, of which differential considerations include primary lung cancer, sarcoma, or lymphoma. Further management considerations may include subspecialty consultation, tissue sampling, and/or FDG PET/CT.   2. Right supraclavicular lymphadenopathy, suspicious for peggy involvement. In addition to the mediastinal/right hilar direct infiltration by the mass as described above, there is another discrete/separate 1.1 cm right hilar lymph node that is also likely involved.   3. 2 mm right upper lobe nodule, and a 3 mm right upper lobe groundglass nodule. Recommend attention to on subsequent follow-up.   3.  Small right and trace left pleural effusions, similar to the chest radiograph of 1/3/2024.   4. There are a few subcentimeter low-attenuation hepatic lesions, which are too small to characterize. However, metastatic disease is not excluded in light of the above. Consider further evaluation with abdominal MRI.   5. 1.5 cm intermediate attenuation lesion arising from the posterior right kidney, which is incompletely characterized on this single phase postcontrast examination. This can be evaluated on MRI as recommended above.       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  On immunostaining, the tumor cells are positive for CK7, Luís-EP4, MOC31, CK19, PAX8, CK5/6, and GATA3 with focal staining for CDX2.  They are negative for CK20, TTF-1, Napsin A, p40, calretinin, WT-1, NKX3.1, SATB2, uroplakin, and CA 19.9.  These results are nonspecific.  The positive staining for Luís-EP4 and MOC31 suggest this is an adenocarcinoma.  The differential would include, upper gastrointestinal/pancreatobiliary, urinary tract/renal and lung.  Clinical and radiographic correlation suggested.  Intradepartmental consultation is in agreement with the diagnosis of carcinoma        2/1/2024 -  Cancer Staged     Staging form: Lung, AJCC 8th Edition  - Clinical: Stage IV (cTX, cN3, cM1) - Signed by Felecia Vega MD on 2/1/2024  Staging comments: No PET/CT or MRI brain at time of staging, pending workup           Review of Systems:  Review of Systems   Constitutional:  Positive for appetite change (decreased) and unexpected weight change (about 10 lbs over the last few months).   Eyes: Negative.    Respiratory:  Positive for cough (with yellowish phlegm) and shortness of breath (with activity).    Cardiovascular:  Positive for chest pain (soreness).   Gastrointestinal:  Positive for constipation. Negative for nausea and vomiting.   Endocrine: Negative.    Genitourinary: Negative.    Musculoskeletal:  Positive for arthralgias, back pain (lower back) and neck pain (right side of neck with numbness).        8-9/10 pain, taking percocet with minimal relief   Skin: Negative.    Allergic/Immunologic: Negative.    Neurological:  Positive for headaches (occasional from neck pain).   Hematological: Negative.    Psychiatric/Behavioral:  Positive for sleep disturbance (wakes up from pain frequently).        Clinical Trial: no    Pain assessment: 9/10 pain, lower back, neck, left leg    PFT: n/a    Prior Radiation: no    Teaching:     MST: completed     Implantable Devices (Port, pacemaker, pain stimulator): no    Hip Replacement: no    Health Maintenance   Topic Date Due    Hepatitis C Screening  Never done    Pneumococcal Vaccine: Pediatrics (0 to 5 Years) and At-Risk Patients (6 to 64 Years) (1 - PCV) Never done    HIV Screening  Never done    Annual Physical  Never done    Zoster Vaccine (1 of 2) Never done    DTaP,Tdap,and Td Vaccines (1 - Tdap) Never done    Colorectal Cancer Screening  Never done    COVID-19 Vaccine (3 - Pfizer risk series) 08/07/2021    Influenza Vaccine (1) Never done    BMI: Adult  01/23/2025    Depression Screening  02/01/2025    HIB Vaccine  Aged Out    IPV Vaccine  Aged Out    Hepatitis A Vaccine  Aged  Out    Meningococcal ACWY Vaccine  Aged Out    HPV Vaccine  Aged Out    Lung Cancer Screening  Discontinued       Past Medical History:   Diagnosis Date    Metastatic non-small cell lung cancer (HCC)        Past Surgical History:   Procedure Laterality Date    IR BIOPSY BONE  1/23/2024    TONSILLECTOMY Bilateral        Family History   Problem Relation Age of Onset    Diabetes Mother     Heart disease Brother        Social History     Tobacco Use    Smoking status: Every Day     Current packs/day: 1.00     Average packs/day: 1 pack/day for 50.1 years (50.1 ttl pk-yrs)     Types: Cigarettes     Start date: 1974     Passive exposure: Past    Smokeless tobacco: Never   Vaping Use    Vaping status: Never Used   Substance Use Topics    Alcohol use: No    Drug use: No          Current Outpatient Medications:     oxyCODONE-acetaminophen (Percocet) 5-325 mg per tablet, Take 1 tablet by mouth every 6 (six) hours as needed for moderate pain Max Daily Amount: 4 tablets, Disp: 60 tablet, Rfl: 0    albuterol (Proventil HFA) 90 mcg/act inhaler, Inhale 2 puffs every 6 (six) hours as needed for wheezing (Patient not taking: Reported on 1/3/2024), Disp: 6.7 g, Rfl: 5    cyclobenzaprine (FLEXERIL) 10 mg tablet, Take 1 tablet (10 mg total) by mouth 3 (three) times a day as needed for muscle spasms (Patient not taking: Reported on 2/1/2024), Disp: 30 tablet, Rfl: 0    predniSONE 20 mg tablet, 1 Po TID X 3 days then 1 PO BID X 3 days then 1 PO daily X 3 days (Patient not taking: Reported on 2/1/2024), Disp: 18 tablet, Rfl: 0    No Known Allergies     Vitals:    02/01/24 0753   BP: 140/82   BP Location: Left arm   Pulse: 92   Resp: 18   Temp: 98 °F (36.7 °C)   TempSrc: Temporal   SpO2: 92%   Weight: 60.3 kg (133 lb)

## 2024-02-01 NOTE — PROGRESS NOTES
Intake received/ Chart reviewed for completion of workup    Pathology completed: 01/23/24    Imaging completed: 01/27/24 MRI abdomen w wo contrast  01/27/24 XR follow up  01/11/24 CT chest w contrast  01/03/24 XR chest pa & lateral     MRI brain scheduled for 02/05/24    All records needed are in patients chart. No records retrieval needed at this time.    Forwarded to care coordinator for scheduling of Hem/Onc appointment ASAP after PET CT  (Stage IV) and move up PET CT currently scheduled for 02/21/23. ONN sent message to Rad/Onc billing to prior authorize PET CT.  Can you also please schedule palliative appointment if not already done. Thank you.     Patient is willing to see Med/Onc at [facility] but would like treatment at [facility]. He is seeing Radiation at [facility].

## 2024-02-01 NOTE — PROGRESS NOTES
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 male current smoker with newly diagnosed likely metastatic NSCLC referred by family medicine for right neck/shoulder pain with CT Chest notable for a 3.9cm destructive mass centered int he right upper anterior mediastinum with involvement of the sternum/manumbrian and upper mediastinal vessels. IR biopsy on 24 showed metastatic NSCLC. There is also note of right supraclavicular lymphadenopathy, several lung nodules, and an MRI Abdomen suspicious for multifocal metastatic disease. He has not yet established care with oncology and has not been fully staged. PET/CT and MRI Brain pending.     We discussed his cancer diagnosis and suggestion of metastatic disease based on MRI abdomen. I explained that he needs to establish care with medical oncology and complete staging. A PET/CT and MRI Brain are pending. I explained that for metastatic NSCLC, systemic therapy is the mainstay of treatment. Palliative radiation therapy is indicated for local symptom control from his destructive upper anterior mediastinal/parasternal mass.     We discussed various palliative regimens for the known mass which can range from 5-10 sessions and the associated risks, benefits, and alternatives. Side effects may include but are not limited to fatigue, radiation esophagitis, radiation pneumonitis, acute skin reaction, pulmonary fibrosis, increased risk of cardiac events and secondary malignancy, injury to nerves, bone weakness/fracture, hypothyroidism, hoarseness, and scar  tissue. We discussed that the pain/disease may persist/worsen despite radiation therapy, especially if it is related to a different focus of disease or degenerative (he is an  by [place]). We discussed that his staging imaging is incomplete and there may be other areas that are not clearly perceived on his current imaging. I also explained that palliative radiation therapy would not cover all disease, but solely the symptomatic area.     We discussed that these recommendations may change/be updated pending the PET/CT results. The patient agreed to proceed with radiation therapy, and informed consent was signed. CT simulation to be scheduled at [place]. A palliative care referral was placed. Nurse navigation was engaged to arrange medical oncology referral. Social work referral was recommended, NN to arrange.    Thank you for the opportunity to participate in the care of this patient.    Felecia Vega MD  Department of Radiation Oncology  [facility]    Orders Placed This Encounter   Procedures   • NM PET CT skull base to mid thigh   • MRI brain w wo contrast   • Ambulatory referral to Palliative Care   • Radiation Simulation Treatment     1. Non-small cell lung cancer metastatic to bone (HCC)  Ambulatory Referral to Radiation Oncology    NM PET CT skull base to mid thigh    MRI brain w wo contrast    Ambulatory referral to Palliative Care    Radiation Simulation Treatment          Total Time Spent  55 minutes spent reviewing EMR in preparation for visit, with the patient, ordering of staging imaging, coordination of medical oncology appointment, review of radiology, and documentation.     CHIEF COMPLAINT  Chief Complaint   Patient presents with   • Consult     Radiation Oncology       History of Present Illness  Presents for radiation oncology consult for sternal metastasis, referred by Dr. Genesis Leonard.      Patient has history of smoking, he presented to family medicine on 1/3/24  with neck pain radiating to his shoulder, he had XR chest that revealed 7 cm mass in right paratracheal region. XR cervical spine revealed no acute osseous abnormality. He underwent CT chest and biopsy of sternum that revealed metastatic non-small cell carcinoma. He is referred by family medicine to Rad Onc and Med Onc. No appts are scheduled with Medical Oncology yet.      1/3/24 XR chest pa & lateral   7 cm masslike opacity in the high right paratracheal region. Recommend further evaluation with a chest CT with contrast to exclude malignancy.   Small right and trace left effusion.      1/11/24 CT chest w contrast  1. There is a destructive mass centered in the right upper-anterior mediastinum, with resultant destruction of the manubrium and sternum, and adjacent right medial pleural thickening that is likely involved. The mass also circumscribes and narrows the right brachiocephalic vein, SVC and left brachiocephalic vein.  The mass extends inferiorly and posteriorly to involve the right paratracheal and subcarinal mediastinum, as well as the right hilum. Findings are highly suspicious for malignant involvement, of which differential considerations include primary lung cancer, sarcoma, or lymphoma. Further management considerations may include subspecialty consultation, tissue sampling, and/or FDG PET/CT.   2. Right supraclavicular lymphadenopathy, suspicious for peggy involvement. In addition to the mediastinal/right hilar direct infiltration by the mass as described above, there is another discrete/separate 1.1 cm right hilar lymph node that is also likely involved.   3. 2 mm right upper lobe nodule, and a 3 mm right upper lobe groundglass nodule. Recommend attention to on subsequent follow-up.   3. Small right and trace left pleural effusions, similar to the chest radiograph of 1/3/2024.   4. There are a few subcentimeter low-attenuation hepatic lesions, which are too small to characterize. However, metastatic  disease is not excluded in light of the above. Consider further evaluation with abdominal MRI.   5. 1.5 cm intermediate attenuation lesion arising from the posterior right kidney, which is incompletely characterized on this single phase postcontrast examination. This can be evaluated on MRI as recommended above.     1/23/24 Bone Biopsy, sternum: metastatic non-small cell carcinoma     1/27/24 MRI abdomen w wo contrast  Impression   In segment 4B of the liver, there is a 1.1 cm mildly T2 hyperintense, and hypoenhancing liver lesion suspicious for metastasis (series 7 image 19, series 11 images 60-63, images 178-183.)     Similarly, there are 2 additional adjacent lesions in the right hepatic dome, each 1 cm, also suspicious for metastatic disease (series 7 image 9 and series 11 image 30, 150.)     Today, the patient notes generally being healthy in the past. He has pain in the right neck. He is a current daily smoker, and has reduced this over time. He has shortness of breath.    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  On immunostaining, the tumor cells are positive for CK7, Luís-EP4, MOC31, CK19, PAX8, CK5/6, and GATA3 with focal staining for CDX2.  They are negative for CK20, TTF-1, Napsin A, p40, calretinin, WT-1, NKX3.1, SATB2, uroplakin, and CA 19.9.  These results are nonspecific.  The positive staining for Luís-EP4 and MOC31 suggest this is an adenocarcinoma.  The differential would include, upper gastrointestinal/pancreatobiliary, urinary tract/renal and lung.  Clinical and radiographic correlation suggested.  Intradepartmental consultation is in agreement with the diagnosis of carcinoma      2/1/2024 -  Cancer Staged    Staging form: Lung, AJCC 8th Edition  - Clinical: Stage  IV (cTX, cN3, cM1) - Signed by Felecia Vega MD on 2024  Staging comments: No PET/CT or MRI brain at time of staging, pending workup         Historical Information   Past Medical History:   Diagnosis Date   • Metastatic non-small cell lung cancer (HCC)      Past Surgical History:   Procedure Laterality Date   • IR BIOPSY BONE  2024   • TONSILLECTOMY Bilateral      Family History   Problem Relation Age of Onset   • Diabetes Mother    • Heart disease Brother      Social History   Social History     Substance and Sexual Activity   Alcohol Use No     Social History     Substance and Sexual Activity   Drug Use No     Social History     Tobacco Use   Smoking Status Every Day   • Current packs/day: 1.00   • Average packs/day: 1 pack/day for 50.1 years (50.1 ttl pk-yrs)   • Types: Cigarettes   • Start date:    • Passive exposure: Past   Smokeless Tobacco Never     Meds/Allergies     Current Outpatient Medications:   •  oxyCODONE-acetaminophen (Percocet) 5-325 mg per tablet, Take 1 tablet by mouth every 6 (six) hours as needed for moderate pain Max Daily Amount: 4 tablets, Disp: 60 tablet, Rfl: 0  •  albuterol (Proventil HFA) 90 mcg/act inhaler, Inhale 2 puffs every 6 (six) hours as needed for wheezing (Patient not taking: Reported on 1/3/2024), Disp: 6.7 g, Rfl: 5  •  HYDROmorphone HCl ER 8 MG TB24, Take 1 tablet by mouth in the morning Max Daily Amount: 1 tablet, Disp: 30 tablet, Rfl: 0  No Known Allergies    Pathology:  See above.    Review of Systems  Refer to nursing note    OBJECTIVE:   /82 (BP Location: Left arm)   Pulse 92   Temp 98 °F (36.7 °C) (Temporal)   Resp 18   Wt 60.3 kg (133 lb)   SpO2 92%   BMI 19.64 kg/m²   Pain Assessment:  Mild to moderate  Performance Status: ECO - Symptomatic but completely ambulatory    Physical Exam  General Appearance:  Alert, cooperative, no distress, appears stated age  HEENT: left eye deviation.   Lungs: Respirations unlabored, no cyanosis, able to  "speak in complete sentences without dyspnea.  Abdomen: Non-distended  Skin: No generalized rash or dermatitis  Neurologic: ANOx3, speech and cognition intact.    Portions of the record may have been created with voice recognition software.  Occasional wrong word or \"sound a like\" substitutions may have occurred due to the inherent limitations of voice recognition software.  Read the chart carefully and recognize, using context, where substitutions have occurred.  "

## 2024-02-01 NOTE — LETTER
2024     Genesis Leonard MD  [address] [facility]  [address] [place] [address]    Patient: Abdulaziz Gomez   YOB: 1960   Date of Visit: 2024       Dear Dr. Leonard:    Thank you for referring Abdulaziz Gomez to me for evaluation. Below are my notes for this consultation.    If you have questions, please do not hesitate to call me. I look forward to following your patient along with you.         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 male current smoker with newly diagnosed likely metastatic NSCLC referred by family medicine for right neck/shoulder pain with CT Chest notable for a 3.9cm destructive mass centered int he right upper anterior mediastinum with involvement of the sternum/manumbrian and upper mediastinal vessels. IR biopsy on 24 showed metastatic NSCLC. There is also note of right supraclavicular lymphadenopathy, several lung nodules, and an MRI Abdomen suspicious for multifocal metastatic disease. He has not yet established care with oncology and has not been fully staged. PET/CT and MRI Brain pending.     We discussed his cancer diagnosis and suggestion of metastatic disease based on MRI abdomen. I explained that he needs to establish care with medical oncology and complete staging. A PET/CT and MRI Brain are pending. I explained that for metastatic NSCLC, systemic therapy is the mainstay of treatment.  Palliative radiation therapy is indicated for local symptom control from his destructive upper anterior mediastinal/parasternal mass.     We discussed various palliative regimens for the known mass which can range from 5-10 sessions and the associated risks, benefits, and alternatives. Side effects may include but are not limited to fatigue, radiation esophagitis, radiation pneumonitis, acute skin reaction, pulmonary fibrosis, increased risk of cardiac events and secondary malignancy, injury to nerves, bone weakness/fracture, hypothyroidism, hoarseness, and scar tissue. We discussed that the pain/disease may persist/worsen despite radiation therapy, especially if it is related to a different focus of disease or degenerative (he is an  by "[employer] We discussed that his staging imaging is incomplete and there may be other areas that are not clearly perceived on his current imaging. I also explained that palliative radiation therapy would not cover all disease, but solely the symptomatic area.     We discussed that these recommendations may change/be updated pending the PET/CT results. The patient agreed to proceed with radiation therapy, and informed consent was signed. CT simulation to be scheduled at [facility]. A palliative care referral was placed. Nurse navigation was engaged to arrange medical oncology referral. Social work referral was recommended, NN to arrange.    Thank you for the opportunity to participate in the care of this patient.    Felecia Vega MD  Department of Radiation Oncology  [facility]    Orders Placed This Encounter   Procedures   • NM PET CT skull base to mid thigh   • MRI brain w wo contrast   • Ambulatory referral to Palliative Care   • Radiation Simulation Treatment     1. Non-small cell lung cancer metastatic to bone (HCC)  Ambulatory Referral to Radiation Oncology    NM PET CT skull base to mid thigh    MRI brain w wo contrast    Ambulatory referral to  Palliative Care    Radiation Simulation Treatment          Total Time Spent  55 minutes spent reviewing EMR in preparation for visit, with the patient, ordering of staging imaging, coordination of medical oncology appointment, review of radiology, and documentation.     CHIEF COMPLAINT  Chief Complaint   Patient presents with   • Consult     Radiation Oncology       History of Present Illness  Presents for radiation oncology consult for sternal metastasis, referred by Dr. Genesis Leonard.      Patient has history of smoking, he presented to [facility] on 1/3/24 with neck pain radiating to his shoulder, he had XR chest that revealed 7 cm mass in right paratracheal region. XR cervical spine revealed no acute osseous abnormality. He underwent CT chest and biopsy of sternum that revealed metastatic non-small cell carcinoma. He is referred by [facility] to Rad Onc and Med Onc. No appts are scheduled with Medical Oncology yet.      1/3/24 XR chest pa & lateral   7 cm masslike opacity in the high right paratracheal region. Recommend further evaluation with a chest CT with contrast to exclude malignancy.   Small right and trace left effusion.      1/11/24 CT chest w contrast  1. There is a destructive mass centered in the right upper-anterior mediastinum, with resultant destruction of the manubrium and sternum, and adjacent right medial pleural thickening that is likely involved. The mass also circumscribes and narrows the right brachiocephalic vein, SVC and left brachiocephalic vein.  The mass extends inferiorly and posteriorly to involve the right paratracheal and subcarinal mediastinum, as well as the right hilum. Findings are highly suspicious for malignant involvement, of which differential considerations include primary lung cancer, sarcoma, or lymphoma. Further management considerations may include subspecialty consultation, tissue sampling, and/or FDG PET/CT.   2. Right supraclavicular lymphadenopathy,  suspicious for peggy involvement. In addition to the mediastinal/right hilar direct infiltration by the mass as described above, there is another discrete/separate 1.1 cm right hilar lymph node that is also likely involved.   3. 2 mm right upper lobe nodule, and a 3 mm right upper lobe groundglass nodule. Recommend attention to on subsequent follow-up.   3. Small right and trace left pleural effusions, similar to the chest radiograph of 1/3/2024.   4. There are a few subcentimeter low-attenuation hepatic lesions, which are too small to characterize. However, metastatic disease is not excluded in light of the above. Consider further evaluation with abdominal MRI.   5. 1.5 cm intermediate attenuation lesion arising from the posterior right kidney, which is incompletely characterized on this single phase postcontrast examination. This can be evaluated on MRI as recommended above.     1/23/24 Bone Biopsy, sternum: metastatic non-small cell carcinoma     1/27/24 MRI abdomen w wo contrast  Impression   In segment 4B of the liver, there is a 1.1 cm mildly T2 hyperintense, and hypoenhancing liver lesion suspicious for metastasis (series 7 image 19, series 11 images 60-63, images 178-183.)     Similarly, there are 2 additional adjacent lesions in the right hepatic dome, each 1 cm, also suspicious for metastatic disease (series 7 image 9 and series 11 image 30, 150.)     Today, the patient notes generally being healthy in the past. He has pain in the right neck. He is a current daily smoker, and has reduced this over time. He has shortness of breath.    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  On immunostaining, the tumor cells are positive for CK7, Luís-EP4, MOC31, CK19, PAX8, CK5/6, and GATA3  with focal staining for CDX2.  They are negative for CK20, TTF-1, Napsin A, p40, calretinin, WT-1, NKX3.1, SATB2, uroplakin, and CA 19.9.  These results are nonspecific.  The positive staining for Luís-EP4 and MOC31 suggest this is an adenocarcinoma.  The differential would include, upper gastrointestinal/pancreatobiliary, urinary tract/renal and lung.  Clinical and radiographic correlation suggested.  Intradepartmental consultation is in agreement with the diagnosis of carcinoma      2/1/2024 -  Cancer Staged    Staging form: Lung, AJCC 8th Edition  - Clinical: Stage IV (cTX, cN3, cM1) - Signed by Felecia Vega MD on 2/1/2024  Staging comments: No PET/CT or MRI brain at time of staging, pending workup         Historical Information  Past Medical History:   Diagnosis Date   • Metastatic non-small cell lung cancer (HCC)      Past Surgical History:   Procedure Laterality Date   • IR BIOPSY BONE  1/23/2024   • TONSILLECTOMY Bilateral      Family History   Problem Relation Age of Onset   • Diabetes Mother    • Heart disease Brother      Social History  Social History     Substance and Sexual Activity   Alcohol Use No     Social History     Substance and Sexual Activity   Drug Use No     Social History     Tobacco Use   Smoking Status Every Day   • Current packs/day: 1.00   • Average packs/day: 1 pack/day for 50.1 years (50.1 ttl pk-yrs)   • Types: Cigarettes   • Start date: 1974   • Passive exposure: Past   Smokeless Tobacco Never     Meds/Allergies    Current Outpatient Medications:   •  oxyCODONE-acetaminophen (Percocet) 5-325 mg per tablet, Take 1 tablet by mouth every 6 (six) hours as needed for moderate pain Max Daily Amount: 4 tablets, Disp: 60 tablet, Rfl: 0  •  albuterol (Proventil HFA) 90 mcg/act inhaler, Inhale 2 puffs every 6 (six) hours as needed for wheezing (Patient not taking: Reported on 1/3/2024), Disp: 6.7 g, Rfl: 5  •  HYDROmorphone HCl ER 8 MG TB24, Take 1 tablet by mouth in the morning Max Daily  "Amount: 1 tablet, Disp: 30 tablet, Rfl: 0  No Known Allergies   Pathology:  See above.    Review of Systems  Refer to nursing note    OBJECTIVE:   /82 (BP Location: Left arm)   Pulse 92   Temp 98 °F (36.7 °C) (Temporal)   Resp 18   Wt 60.3 kg (133 lb)   SpO2 92%   BMI 19.64 kg/m²   Pain Assessment:  Mild to moderate  Performance Status: ECO - Symptomatic but completely ambulatory    Physical Exam  General Appearance:  Alert, cooperative, no distress, appears stated age  HEENT: left eye deviation.   Lungs: Respirations unlabored, no cyanosis, able to speak in complete sentences without dyspnea.  Abdomen: Non-distended  Skin: No generalized rash or dermatitis  Neurologic: ANOx3, speech and cognition intact.    Portions of the record may have been created with voice recognition software.  Occasional wrong word or \"sound a like\" substitutions may have occurred due to the inherent limitations of voice recognition software.  Read the chart carefully and recognize, using context, where substitutions have occurred.     "

## 2024-02-01 NOTE — PROGRESS NOTES
"Oncology Nurse Navigator:    Called patient and wife  for initial outreach from nurse navigator. Introduced myself and my role. Reviewed upcoming appointment and prep for PET CT. Advised we are trying to move up PET CT.  Assessed barriers of care.     Patient lives wife . Denies any transportation at this time issues. OSW referral placed.     RN Initial Assessment     What is your understanding of your diagnosis? \"Tumor on chest, I have test to do prior to treatment. Most of them scheduled. \"    Do you have transportation for initial appts? Yes - both patient and wife drive.     Will someone be coming with you? Malgorzata - wife    Review of Communication consent and update as needed.     How do you prefer to receive information? Verbal both written    Family history of cancer? No    Interested in speaking with oncology Genetics? No    Systems Assessment     Do you smoke?  Yes - 5 cigarettes daily, cut back about 2 1/2 weeks ago. Smoking history of  1 ppd for 50 years.   Previous smoker?   Would you like assistance with quitting - yes   Discussion of quitting and referral - referral placed    Reports productive cough for clear to dark phlegm. Denies hemoptysis. Reports wheezing at times. States has SOB with activity.  Reports it is hard to swallow, feels swollen.  Reports weight loss of 7 lbs in past month. Decreased appetite, lost appetite about a month ago. Referral to dietitian placed.       Chronic lung infections? No    Hoarseness when you speak? Yes - started about late October 2023    Oxygen? No    Any Chest pain? No    Are you having any pain (back/shoulder/rib/bone pain)?Yes -  Bilateral back of neck, most of time right shoulder but can jump to different spots in back. Left Sciatic pain that left side shoots down the leg.     Rate: 1-10   Neck/Lower back sleep/sitting 10/10  Shoulder pain 7/10    Characteristics: Sharp, stabbing, dull   Neck pain - constant stiff pain - \"locked up\"  Shoulder pain \" stabbing, " "intermittent comes and goes - got better was worse\"  Sciatic pain - \"feels like someone is cutting leg off - sometimes it does stop- but it there for the most part \"    Is there any thing that makes it worse?  Makes it better?   Laying down - makes back/ shoulder worse but sciatic feels better.    Sitting in chair helps for a bit    Interventions: Percocet/Advil - had tried flexeril that worked at first but then made it worse.     Relief? Percocet helps sleep but doesn't take all the pain away    Patient has referral to palliative care.     Any fatigue or weakness? Weak and fatigues - not sleeping well     Can you get around independently? Cane due to sciatic pain     Assistance with ADLs?  No      Are you experiencing any anxiety/depression that is new or worsening? No    Do you have any vision changes/headaches/dizziness/numbness/altered mental status/increase in forgetfulness? Right side of neck numbness when things stiffen up ear becomes numb.     Any swelling new or worsening?  No    Do you have a PCP that you are established with? Yes - Dr. eLonard     Patient was given my direct contact information.  He and wife are  aware they can reach out with any questions.  General assessment complete. Patient expresses appreciation of phone call.    Outreach to daughter, introduced myself and my role. Advised I was reaching out in regarding need for insurance card. States she will get a copy of insurance card tonight and have PCP office upload in his chart tomorrow.  Advised I am also working on moving up PET CT. We will be getting him an appointment with hem/onc after PET CT and palliative if they have not already reached out to him. She was given my contact information should she have any questions. Expressed appreciation of phone call.         "

## 2024-02-01 NOTE — TELEPHONE ENCOUNTER
Sridevi from [facility] Rad/Onc called in to schedule the patient with Hem/Onc, we answered the questionnaire and the patient's chart was sent to the lung team for review.

## 2024-02-02 ENCOUNTER — TELEPHONE (OUTPATIENT)
Dept: NUTRITION | Facility: CLINIC | Age: 64
End: 2024-02-02

## 2024-02-02 ENCOUNTER — PATIENT OUTREACH (OUTPATIENT)
Dept: CASE MANAGEMENT | Facility: OTHER | Age: 64
End: 2024-02-02

## 2024-02-02 NOTE — PROGRESS NOTES
OSW received SW referral. OSW will place outreach TC to complete the distress thermometer and psychosocial assessment.

## 2024-02-02 NOTE — TELEPHONE ENCOUNTER
Received notification by RN Navigator (Pedro) on 2/1/24 that pt has triggered for oncology nutrition care (reason for referral: Malnutrition Screening Tool (MST) Triggers: scored a 2 indicating 2-13# (0.9-6 kg) recent wt loss and is eating poorly due to a decreased appetite. (Date of MST: 2/1/24) and patient request due to Patient lost 7 lbs in past month, no appetite ).    Contacted Radha and introduced self and explained the reason for today's call.      Spoke with Radha today who reports that he is not hungry, has lost his appetite, and has lost some wt in the past couple of months (once he started using advil).  He feels these sx are r/t the medications he is taking.  Reviewed med list (inhaler, pain meds) with pt and asked if lack of appetite could be d/t pain which he said could be a possibility.  He then reported that his other medical problems and stress could be contributing to his sx as well.    Discussed oncology nutrition services available and the benefits of meeting for a consultation.  Pt reports that he needs to wait to set up an appt d/t the fact that he still working and needs to coordinate other appts first.  He was open to receiving handouts via email.  Suggested he consider starting 1-2 oral nutrition supplements per day (provided suggestions).  I asked if it would be ok if I reach out to him in ~2 weeks to check in and he said that would work for him.     E-mailed to pt (@[email or web address]'): [employer] Eating Hints Book, Oncology Milkshake & Smoothie Recipes, and Commercial Supplements List    Provided this RD’s contact information asking that Radha reach out prn.  All questions/concerns addressed at this time.

## 2024-02-05 ENCOUNTER — PATIENT OUTREACH (OUTPATIENT)
Dept: CASE MANAGEMENT | Facility: OTHER | Age: 64
End: 2024-02-05

## 2024-02-05 ENCOUNTER — HOSPITAL ENCOUNTER (OUTPATIENT)
Dept: MRI IMAGING | Facility: HOSPITAL | Age: 64
Discharge: HOME | End: 2024-02-05
Attending: INTERNAL MEDICINE
Payer: COMMERCIAL

## 2024-02-05 DIAGNOSIS — C79.51 NON-SMALL CELL LUNG CANCER METASTATIC TO BONE (HCC): ICD-10-CM

## 2024-02-05 DIAGNOSIS — C34.90 NON-SMALL CELL LUNG CANCER METASTATIC TO BONE (HCC): ICD-10-CM

## 2024-02-05 PROCEDURE — 70553 MRI BRAIN STEM W/O & W/DYE: CPT

## 2024-02-05 PROCEDURE — G1004 CDSM NDSC: HCPCS

## 2024-02-05 PROCEDURE — A9585 GADOBUTROL INJECTION: HCPCS | Performed by: INTERNAL MEDICINE

## 2024-02-05 RX ORDER — GADOBUTROL 604.72 MG/ML
6 INJECTION INTRAVENOUS
Status: COMPLETED | OUTPATIENT
Start: 2024-02-05 | End: 2024-02-05

## 2024-02-05 RX ADMIN — GADOBUTROL 6 ML: 604.72 INJECTION INTRAVENOUS at 12:00

## 2024-02-05 NOTE — PROGRESS NOTES
OSW placed outreach TC to pt this morning. OSW spoke with the pt, who states he is getting ready to  the dye for his MRI. OSW offered to call him back later this afternoon and he was agreeable.     ADDENDUM:  OSW placed another outreach TC this afternoon. Pt was available;e to speak with this writer.     Biopsychosocial and Barriers Assessment    Cancer Diagnosis: Metastatic Lung  Home/Cell Phone: [phone]  Emergency Contact: Yannspouse- [phone]  Marital Status:   Interpretation concerns, speaks another language, preferred language: English  Cultural concerns: none  Ability to read or write: yes    Caregiver/Support: Strong support from family  Children: 1 son and 1 daughter  Child/Elder care: n/as    Housing: [place]  Home Setup: 5 steps to enter  Lives With: Spouse  Daily Living Activities: independent  Durable Medical Equipment: none  Ambulation: independent    Preferred Pharmacy: [facility] co-pays with insurance: [employer] co-pays with medication coverage: none  No medication coverage: n/a    Primary Care Provider: Dr. Leonard  Hx of Home Health Care: none  Hx of Short term rehab: none  Mental Health Hx: none  Substance Abuse Hx: Current Smoker  Employment: Employed   Status/Location: not addressed  Ability to pay bills: yes at this time  POA/LW/AD: none  Transportation Plan/Concerns: Self/daughter/spouse      What do you know about your Cancer Diagnosis    What has your doctor told you about your cancer diagnosis: Metastatic Lung Cancer.    What has your doctor told you about your cancer treatment: Pt states he will be receiving radiation.     What specific concerns do you have about your diagnosis and treatment:None at this time. He states he is hanging in there and doing what he needs to do.     Have you been made aware of any hair loss associated with treatment: N/A    Additional Comments:  OSW placed outreach TC to pt this afternoon. OSW introduced self and role. Pt  is a very pleasant gentleman with a dx of metastatic lung cancer. He states that he will be having radiation in the near future. He had a MRI today and shared that he has to have a PET scan as well. Pt states he is well supported by his family. His daughter has been very helpful with driving him to his appointments, as his spouse does not drive on the highway. Pt completed a Dt, where he scored a 2/10. He states he is doing ok right now. He expressed the following concerns: pain, sleep, fatigue, loss of appetite and loss or change in physical abilities. He reports that he continues to work as an . 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   Pt states that he has a positive attitude and he trusts in the lord.   OSW educated on the [facility] and the cancer hope network. He declines the need at this time, however states he will reach out if he needs them. OSW offered to check in with him in a month, however he states that if he needs anything he will call this writer. OSW wished him the best and encouraged him to call if he needs anything. He thanked this writer for the call. OSW will close the referral at this time.

## 2024-02-06 ENCOUNTER — PATIENT OUTREACH (OUTPATIENT)
Dept: HEMATOLOGY ONCOLOGY | Facility: CLINIC | Age: 64
End: 2024-02-06

## 2024-02-06 PROBLEM — C34.90 METASTATIC NON-SMALL CELL LUNG CANCER (HCC): Status: ACTIVE | Noted: 2024-01-01

## 2024-02-06 NOTE — PROGRESS NOTES
Patient was initially scheduled for Pet/CT for 2-. We have received authorization and I was able to get patient's testing moved up to 2-7-2024 @ 8:00 am . I spoke with patient and informed him of new appointment information including instructions. I informed hi I am also working on moving his medical oncology consult up as well . I informed patient I would reach back out to him on wether that was able to be done or not. He had no additional questions or concerns at this time. I provided my contact information and encouraged him to call should any arise.   
done

## 2024-02-06 NOTE — PROGRESS NOTES
Outpatient Consultation - Palliative and Supportive Care   Abdulaziz Gomez 63 y.o. male [id number]    Assessment & Plan  Problem List Items Addressed This Visit       Current every day smoker     Smoked 1 ppd for years.  Currently smoking 3 cigarettes a day.  -Discussed importance of smoking cessation especially in setting of lung cancer.         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    I did speak with Dr. Kearns as he was in office and scheduled to see patient after my visit. Patient was seen with Dr. Kearns of Medical Oncology. Thorough discussion held along with extensive review of PET CT findings along with review of biopsy results concerning for lung adenocarcinoma.    -Reviewed patient's concerning signs and symptoms in which we recommended evaluation at the ER to then be admitted for cancer pain management, radiation oncology evaluation, and potentially inpatient chemotherapy.    Discussed that therapy would not be curative, but palliative due to the nature and spread of his disease.    ADT 21 placed for [facility].  -Patient has been on Perocet 5-325mg which he has only taken a maximum of 3 tabs a day since he was prescribed this medication (with little to no effect).  -Also on PO ER dilaudid  -Discussed plans of discontinuing ER Dilaudid and work on immediate release Opioid dosing which would provide him effective pain management. However, due to the nature of his ongoing pain, will await his admission and plan to see him in the hospital for further pain regimen adjustments.         Relevant Orders    Transfer to other facility (Completed)    Goals of care, counseling/discussion     Goal at this time - remains focused on disease directed  "treatment. Patient open to chemotherapy and radiation as option for treatments.    -Patient seen by Palliative Social work for further support and completion of ACP. Patient would like his daughter to serve as substitute medical decision maker in the event he were to lack medical decision making capacity. [facility] ACP document completed with Palliative , Christine Nieto.         Cancer related pain     Ongoing pain despite intervention by patient's PCP with opioid medications including ER Dilaudid and Percocet 5-325mg.    Patient is unable to sleep due to his pain that is located along his chest, sternum, lower back, hips, and left thigh and calf.    -Recommend patient to report to ER for evaluation and admission for further management and monitored cancer-related pain management.         Palliative care patient    Ambulatory dysfunction    Dysphagia    Loss of appetite     Due to suspected oropharyngeal dysphagia. Trouble with swallowing solid foods. Patient is limited to soft foods or liquids. Sensation and discomfort with swallowing at the throat.         Weight loss     Patient reports 7 pound weight loss in the past 1-2 weeks.  Baseline weight of 145 pounds (patient weighing 137 pounds today).         Shortness of breath     Progressive shortness of breath that has worsened over the past 2-3 days. States he is trying to constantly catch his breath and it is \"getting scary\" with his worsening shortness of breath.         Pleural effusion on right     Shortness of breath likely 2/2 to large pleural effusion.  PET CT from today, 2/7/2024 reviewed.    CT images: Moderate to large right pleural effusion, increased from prior CT. Stable small to moderate pericardial effusion. Scattered emphysematous changes of the lung fields.           Other Visit Diagnoses       Non-small cell lung cancer metastatic to bone (HCC)        Metastatic disease (HCC)        Relevant Orders    Transfer to other facility " (Completed)            Psychosocial   Supportive listening provided  Normalized experience of patient/family  Provided anxiety containment  --------------------------------------------------------------------------------------  Goals of Care / Advanced Care Planning  -Advanced Directive Counseling Given  -Patient was assisted by Palliative , Christine Nieto, in the completion of [facility] document indicating Soniya davila, as substitute MDM.  -Health Care Proxy/Agent/ Power of  : patient designates Soniya davila     Referrals Placed / Medical Equipment Ordered  -ADT21 placed for transfer to [facility] Emergency Room for evaluation    Follow-Up Recommendations  -Follow-up with PCP and current medical specialists  -Follow-up with palliative care: post-hospitalization     Medications adjusted this encounter:  Requested Prescriptions      No prescriptions requested or ordered in this encounter     Orders Placed This Encounter   Procedures    Transfer to other facility     There are no discontinued medications.      Abdulaziz Gomez was seen today for symptoms and planning cares related to above illnesses.  Above orders were sent electronically, or provided in hardcopy in clinic.  I have reviewed the patient's controlled substance dispensing history in the Prescription Drug Monitoring Program in compliance with the [place] regulations before prescribing any controlled substances.   We appreciate the referral, and wish for him to continue to follow with us.  If there are questions or concerns, please contact us through our clinic/answering service 24 hours a day, seven days a week.      Visit Information    Accompanied By: Family member, Spouse    Source of History: Self, Family member, Spouse    History Limitations: None    Contacts:Caregiver Information: Name: Wife and daughter    Chief Complaint  Chief Complaint   Patient presents with    Consult    Constipation    Pain    Shortness of Breath        History of Present Illness    Abdulaziz Gomez is a 63 y.o. male who presents as a referral from Dr. Vega of radiation oncology for primary palliative diagnosis of lung cancer (NSLC by biopsy).  Consultation is requested for: symptom management, disease process education and discussion of prognosis, advance care planning, emotional support in the setting of serious illness.   Patient presents today with wife and daughter. He is in notable discomfort due to his pain and is dyspneic at rest which is worsened with conversation. He is favoring his left thigh due to the pain he is experiencing. He endorses progressive worsening of shortness of breath, particularly worsened over the past 2 days where he has felt scared about being able to breath and catch his breath. He has ongoing intractable pain to his back and left leg with no improvement with pain regimen started by his PCP. He also has had constipation with these opioids. Last bowel movement was 3-4 days ago. He also endorses worsening dysphagia where he is having trouble with swallowing solid foods. His appetite has decreased precipitously with continued weight loss of 7 pounds in the past 1-2 weeks.    Patient had recent PT Scan completed today and this was reviewed with patient including findings of large pleural effusion of the right. I did have my concerns and recommended patient to go to the ER for evaluation. I did also speak with Dr. Kearns of Medical Oncology who was scheduled to see the patient later today and he did visit the patient with me. Extensive review of patient's imaging were discussed concerning for widespread metastatic disease involving the spine, hips, and liver. Reviewed treatments including chemotherapy and potentially radiation that would be Palliative and not curative. Dr. Kearns was in agreement with patient to be evaluated in the ER and admitted to the hospital for plans for initiation of chemotherapy, radiation oncology  evaluation for potential intervention to the spine, and cancer related pain management.   ADT21 placed. Patient and family agree to report to [facility] ED for evaluation. Palliative  assisted and spoke with patient and family. ACP was reviewed and completed in which patient would want his daughter to serve as MDM in the event he lacks medical decision making capacity.      NM PET Scan 2/7/2024  1. FDG avid intrathoracic disease most extensive at the superior mediastinum and right perihilar region concerning for malignancy.  2. Extensive FDG avid peggy disease in the lower neck, chest and abdomen suspicious for metastasis.  3. Scattered FDG-avid hepatic lesions concerning for metastasis.  4. Innumerable FDG avid osseous lesions compatible with widespread osseous metastasis.  5. A few small foci of FDG uptake along the left anterior thigh musculature for which intramuscular metastasis is not excluded.       Biopsy 1/23/2024  Final Diagnosis   A.  Sternum, biopsy:  Metastatic non-small cell carcinoma.     MRI Abdomen 1/27/2024  In segment 4B of the liver, there is a 1.1 cm mildly T2 hyperintense, and hypoenhancing liver lesion suspicious for metastasis (series 7 image 19, series 11 images 60-63, images 178-183.)     Similarly, there are 2 additional adjacent lesions in the right hepatic dome, each 1 cm, also suspicious for metastatic disease (series 7 image 9 and series 11 image 30, 150.)      CT Chest 1/11/2024  1. There is a destructive mass centered in the right upper-anterior mediastinum, with resultant destruction of the manubrium and sternum, and adjacent right medial pleural thickening that is likely involved. The mass also circumscribes and narrows the right brachiocephalic vein, SVC and left brachiocephalic vein.  The mass extends inferiorly and posteriorly to involve the right paratracheal and subcarinal mediastinum, as well as the right hilum. Findings are highly suspicious for malignant involvement,  of which differential considerations include primary lung cancer, sarcoma, or lymphoma. Further management considerations may include subspecialty consultation, tissue sampling, and/or FDG PET/CT.  2. Right supraclavicular lymphadenopathy, suspicious for peggy involvement. In addition to the mediastinal/right hilar direct infiltration by the mass as described above, there is another discrete/separate 1.1 cm right hilar lymph node that is also   likely involved.  3. 2 mm right upper lobe nodule, and a 3 mm right upper lobe groundglass nodule. Recommend attention to on subsequent follow-up.  3. Small right and trace left pleural effusions, similar to the chest radiograph of 1/3/2024.  4. There are a few subcentimeter low-attenuation hepatic lesions, which are too small to characterize. However, metastatic disease is not excluded in light of the above. Consider further evaluation with abdominal MRI.  5. 1.5 cm intermediate attenuation lesion arising from the posterior right kidney, which is incompletely characterized on this single phase postcontrast examination. This can be evaluated on MRI as recommended above.    XR Chest 1/3/2024  7 cm masslike opacity in the high right paratracheal region. Recommend further evaluation with a chest CT with contrast to exclude malignancy.  Small right and trace left effusion.    Pertinent Palliative Care Domains    Physical Symptoms:chairbound    Psychological Symptoms:mild anxiety, coping well    Social Aspects: support system wife and daughter     Spiritual Aspects: to revisit    Advance Directive and Living Will: Yes    Power of :  No  POLST:  No    Historical Data  Past Medical History:   Diagnosis Date    Metastatic non-small cell lung cancer (HCC)      Past Surgical History:   Procedure Laterality Date    IR BIOPSY BONE  1/23/2024    TONSILLECTOMY Bilateral      Social History     Socioeconomic History    Marital status: /Civil Union     Spouse name: Not on file     Number of children: Not on file    Years of education: Not on file    Highest education level: Not on file   Occupational History    Not on file   Tobacco Use    Smoking status: Every Day     Current packs/day: 1.00     Average packs/day: 1 pack/day for 50.1 years (50.1 ttl pk-yrs)     Types: Cigarettes     Start date: 1974     Passive exposure: Past    Smokeless tobacco: Never   Vaping Use    Vaping status: Never Used   Substance and Sexual Activity    Alcohol use: No    Drug use: No    Sexual activity: Not on file   Other Topics Concern    Not on file   Social History Narrative    Not on file     Social Determinants of Health     Financial Resource Strain: Not on file   Food Insecurity: Not on file   Transportation Needs: Not on file   Physical Activity: Not on file   Stress: Not on file   Social Connections: Not on file   Intimate Partner Violence: Not on file   Housing Stability: Not on file     Family History   Problem Relation Age of Onset    Diabetes Mother     Heart disease Brother      No Known Allergies  No current facility-administered medications for this visit.     No current outpatient medications on file.       Review of Systems   Constitutional: Positive for decreased appetite and weight loss. Negative for chills, fever and malaise/fatigue.   HENT:  Positive for hoarse voice. Negative for odynophagia and sore throat.    Eyes:  Negative for visual disturbance.   Cardiovascular:  Positive for dyspnea on exertion. Negative for chest pain, irregular heartbeat, leg swelling and palpitations.   Respiratory:  Positive for shortness of breath. Negative for cough.    Musculoskeletal:  Positive for back pain and joint pain (back, hip, left leg). Negative for joint swelling and myalgias.   Gastrointestinal:  Positive for constipation and dysphagia. Negative for abdominal pain, diarrhea, excessive appetite, nausea and vomiting.   Genitourinary:  Negative for dysuria.   Neurological:  Positive for weakness.  "Negative for headaches, light-headedness, numbness and paresthesias.   Psychiatric/Behavioral:  Negative for memory loss and substance abuse. The patient has insomnia.    All other systems reviewed and are negative.        Vital Signs    /78 (BP Location: Right arm, Patient Position: Sitting, Cuff Size: Standard)   Pulse 101   Temp 97.5 °F (36.4 °C) (Temporal)   Ht 5' 9\" (1.753 m)   Wt 62.1 kg (137 lb)   SpO2 98%   BMI 20.23 kg/m²     Physical Exam and Objective Data  Physical Exam  Vitals reviewed.   Constitutional:       General: He is in acute distress.      Appearance: He is ill-appearing. He is not toxic-appearing or diaphoretic.      Comments: Thin body habitus. Alert, oriented, pleasantly conversant. Dyspneic at rest and worsened with conversation. Notable discomfort due to pain from back and left lower extremity.   HENT:      Head: Normocephalic and atraumatic.      Mouth/Throat:      Pharynx: Oropharynx is clear. No oropharyngeal exudate.   Eyes:      Conjunctiva/sclera: Conjunctivae normal.   Neck:      Trachea: No tracheal deviation.   Cardiovascular:      Rate and Rhythm: Normal rate and regular rhythm.   Pulmonary:      Effort: Tachypnea and respiratory distress present.      Comments: Notable dyspnea and tachypnea at rest worsened with conversation.  Chest:      Chest wall: Tenderness present.   Abdominal:      Palpations: Abdomen is soft.      Tenderness: There is abdominal tenderness (RUQ).   Musculoskeletal:         General: No swelling.      Cervical back: Normal range of motion and neck supple.      Right lower leg: No tenderness. No edema.      Left lower leg: No tenderness. No edema.      Comments: Negative homans sign bilaterally. Pain along the left leg, particularly of the left anterior thigh. Negative calf pain on exam.    Lymphadenopathy:      Cervical: No cervical adenopathy.   Skin:     General: Skin is warm and dry.      Coloration: Skin is not cyanotic or pale. " "  Neurological:      General: No focal deficit present.      Mental Status: He is alert and oriented to person, place, and time.   Psychiatric:         Mood and Affect: Mood normal.           Radiology and Laboratory:  I personally reviewed and interpreted the following results: labs, imaging (PET Scan from 2/7/2024, MRI brain 2/5/2024), specialist notes and PCP notes    65 minutes were spent in this ambulatory visit with greater than 50% of the time spent face to face with patient in counseling or coordination of care. A description of the counseling / coordination of care: symptom assessment and management, medication review, medication adjustment, psychosocial support, chart review, imaging review, lab review, advanced directives, goals of care, hospice services, opioid titration, supportive listening, and anticipatory guidance.. All of the patient's questions were answered during this discussion.    Note Shared.    Jb Maynard,   Palliative & Supportive Care  Clinic/Answering Service: [phone]  You can find me on [employer].    Portions of this document may have been created using dictation software and as such some \"sound alike\" terms may have been generated by the system. Do not hesitate to contact me with any questions or clarifications.     "

## 2024-02-07 ENCOUNTER — HOSPITAL ENCOUNTER (OUTPATIENT)
Dept: RADIOLOGY | Age: 64
Discharge: HOME | End: 2024-02-07
Payer: COMMERCIAL

## 2024-02-07 ENCOUNTER — CONSULT (OUTPATIENT)
Dept: PALLIATIVE MEDICINE | Facility: CLINIC | Age: 64
End: 2024-02-07
Payer: COMMERCIAL

## 2024-02-07 ENCOUNTER — CLINICAL SUPPORT (OUTPATIENT)
Dept: PALLIATIVE MEDICINE | Facility: CLINIC | Age: 64
End: 2024-02-07
Payer: COMMERCIAL

## 2024-02-07 ENCOUNTER — OFFICE VISIT (OUTPATIENT)
Dept: HEMATOLOGY ONCOLOGY | Facility: CLINIC | Age: 64
End: 2024-02-07
Payer: COMMERCIAL

## 2024-02-07 ENCOUNTER — DOCUMENTATION (OUTPATIENT)
Dept: HEMATOLOGY ONCOLOGY | Facility: CLINIC | Age: 64
End: 2024-02-07

## 2024-02-07 VITALS
SYSTOLIC BLOOD PRESSURE: 140 MMHG | WEIGHT: 137 LBS | OXYGEN SATURATION: 98 % | DIASTOLIC BLOOD PRESSURE: 78 MMHG | TEMPERATURE: 97.5 F | HEART RATE: 101 BPM | BODY MASS INDEX: 20.29 KG/M2 | HEIGHT: 69 IN

## 2024-02-07 DIAGNOSIS — R63.4 WEIGHT LOSS: ICD-10-CM

## 2024-02-07 DIAGNOSIS — C34.90 METASTATIC NON-SMALL CELL LUNG CANCER (HCC): Primary | ICD-10-CM

## 2024-02-07 DIAGNOSIS — J90 PLEURAL EFFUSION ON RIGHT: ICD-10-CM

## 2024-02-07 DIAGNOSIS — R26.2 AMBULATORY DYSFUNCTION: ICD-10-CM

## 2024-02-07 DIAGNOSIS — Z71.89 GOALS OF CARE, COUNSELING/DISCUSSION: ICD-10-CM

## 2024-02-07 DIAGNOSIS — Z71.89 COUNSELING AND COORDINATION OF CARE: Primary | ICD-10-CM

## 2024-02-07 DIAGNOSIS — F17.200 CURRENT EVERY DAY SMOKER: ICD-10-CM

## 2024-02-07 DIAGNOSIS — Z51.5 PALLIATIVE CARE PATIENT: ICD-10-CM

## 2024-02-07 DIAGNOSIS — R63.0 LOSS OF APPETITE: ICD-10-CM

## 2024-02-07 DIAGNOSIS — C79.51 NON-SMALL CELL LUNG CANCER METASTATIC TO BONE (HCC): ICD-10-CM

## 2024-02-07 DIAGNOSIS — C79.9 METASTATIC DISEASE (HCC): ICD-10-CM

## 2024-02-07 DIAGNOSIS — R13.12 OROPHARYNGEAL DYSPHAGIA: ICD-10-CM

## 2024-02-07 DIAGNOSIS — C34.90 ADENOCARCINOMA OF LUNG (HCC): ICD-10-CM

## 2024-02-07 DIAGNOSIS — C79.51 METASTATIC MALIGNANT NEOPLASM TO STERNUM (HCC): ICD-10-CM

## 2024-02-07 DIAGNOSIS — C34.90 NON-SMALL CELL LUNG CANCER METASTATIC TO BONE (HCC): ICD-10-CM

## 2024-02-07 DIAGNOSIS — G89.3 CANCER RELATED PAIN: ICD-10-CM

## 2024-02-07 DIAGNOSIS — R06.02 SHORTNESS OF BREATH: ICD-10-CM

## 2024-02-07 PROBLEM — E87.1 HYPONATREMIA: Status: ACTIVE | Noted: 2024-02-07

## 2024-02-07 PROBLEM — J96.01 ACUTE RESPIRATORY FAILURE WITH HYPOXIA (HCC): Status: ACTIVE | Noted: 2024-02-07

## 2024-02-07 PROBLEM — R13.10 DYSPHAGIA: Status: ACTIVE | Noted: 2024-01-01

## 2024-02-07 LAB — GLUCOSE SERPL-MCNC: 83 MG/DL (ref 65–140)

## 2024-02-07 PROCEDURE — G1004 CDSM NDSC: HCPCS

## 2024-02-07 PROCEDURE — 78815 PET IMAGE W/CT SKULL-THIGH: CPT

## 2024-02-07 PROCEDURE — A9552 F18 FDG: HCPCS

## 2024-02-07 PROCEDURE — 99205 OFFICE O/P NEW HI 60 MIN: CPT | Performed by: STUDENT IN AN ORGANIZED HEALTH CARE EDUCATION/TRAINING PROGRAM

## 2024-02-07 PROCEDURE — NC001 PR NO CHARGE

## 2024-02-07 PROCEDURE — 99245 OFF/OP CONSLTJ NEW/EST HI 55: CPT | Performed by: INTERNAL MEDICINE

## 2024-02-07 PROCEDURE — 82948 REAGENT STRIP/BLOOD GLUCOSE: CPT

## 2024-02-07 NOTE — ED ATTENDING ATTESTATION
2/7/2024  I, Papo Chung DO, saw and evaluated the patient. I have discussed the patient with the resident/non-physician practitioner and agree with the resident's/non-physician practitioner's findings, Plan of Care, and MDM as documented in the resident's/non-physician practitioner's note, except where noted. All available labs and Radiology studies were reviewed.  I was present for key portions of any procedure(s) performed by the resident/non-physician practitioner and I was immediately available to provide assistance.       At this point I agree with the current assessment done in the Emergency Department.  I have conducted an independent evaluation of this patient a history and physical is as follows:    63-year-old male with past medical history significant for lung cancer who presents to the ED for evaluation of shortness of breath and acute on chronic pain.  Was referred to the ED for admission by his outpatient physicians.  On my exam, is resting comfortably no acute distress after receiving medications, speaking in full sentences without respiratory difficulty, pulse is regular with normal rate, moving all extremities with no gross motor deficit.  Was noted to be hypoxic to 87% on room air requiring oxygen supplementation which is new for him.  Labs show mild hyponatremia 129, normal renal function, no acute LFT abnormalities, initial troponin of 27, BNP 57, no leukocytosis, stable hemoglobin, INR 1.05.  EKG is nonischemic as interpreted by myself, appears to be a sinus rhythm.  CT PE was obtained showing no evidence of PE but does have continuing evidence of large right-sided pleural effusion.  In regards to the patient's pain, he does have known back pain.  He did have PET scan done this morning which shows significant metastatic disease with multiple osseous lesions.  Plan will be for hospitalization.  Results discussed.  Patient agreeable with the plan.  Admitting team consulted.  Excepted onto their  service for further care and management.  Stable at the time of disposition    ED Course         Critical Care Time  Procedures

## 2024-02-07 NOTE — ED PROVIDER NOTES
History  Chief Complaint   Patient presents with    Shortness of Breath    Pain     Pt's daughter reports chronic but worsening SOB, pain located in chest, neck, L leg, buttocks, hip. Referred by Dr Maynard in Palliative Care. Hx lung cancer.     The pt is a 63 year old male who presents to ED with family for evaluation of shortness of breath and pain. Daughter reports the pt was being seen by palliative today for recent dx of lung cancer with metastatic disease to the spine and was referred to ED for evaluation of shortness of breath and increased pain through the back, radiating to the LE, and for admission for inpatient cancer treatment and consult with pain management. Pt denies fever, chills, hematuria, hematochezia        Prior to Admission Medications   Prescriptions Last Dose Informant Patient Reported? Taking?   HYDROmorphone HCl ER 8 MG TB24 2/7/2024  No Yes   Sig: Take 1 tablet by mouth in the morning Max Daily Amount: 1 tablet   albuterol (Proventil HFA) 90 mcg/act inhaler Not Taking Self No No   Sig: Inhale 2 puffs every 6 (six) hours as needed for wheezing   Patient not taking: Reported on 1/3/2024   oxyCODONE-acetaminophen (Percocet) 5-325 mg per tablet 2/6/2024  No Yes   Sig: Take 1 tablet by mouth every 6 (six) hours as needed for moderate pain Max Daily Amount: 4 tablets      Facility-Administered Medications: None       Past Medical History:   Diagnosis Date    Metastatic non-small cell lung cancer (HCC)        Past Surgical History:   Procedure Laterality Date    IR BIOPSY BONE  1/23/2024    TONSILLECTOMY Bilateral        Family History   Problem Relation Age of Onset    Diabetes Mother     Heart disease Brother      I have reviewed and agree with the history as documented.    E-Cigarette/Vaping    E-Cigarette Use Never User      E-Cigarette/Vaping Substances     Social History     Tobacco Use    Smoking status: Every Day     Current packs/day: 1.00     Average packs/day: 1 pack/day for 50.1 years  (50.1 ttl pk-yrs)     Types: Cigarettes     Start date: 1974     Passive exposure: Past    Smokeless tobacco: Never   Vaping Use    Vaping status: Never Used   Substance Use Topics    Alcohol use: No    Drug use: No        Review of Systems   Constitutional:  Negative for chills and fever.   HENT:  Positive for trouble swallowing. Negative for congestion and nosebleeds.    Respiratory:  Positive for cough and shortness of breath.    Cardiovascular:  Negative for chest pain.   Gastrointestinal:  Positive for constipation. Negative for abdominal pain, anal bleeding, blood in stool, diarrhea, nausea and vomiting.   Genitourinary:  Negative for difficulty urinating, dysuria and hematuria.   Musculoskeletal:  Positive for back pain, gait problem and myalgias. Negative for neck pain.   Skin:  Negative for rash and wound.   Neurological:  Negative for dizziness and syncope.   Hematological:  Does not bruise/bleed easily.   Psychiatric/Behavioral:  Positive for sleep disturbance.        Physical Exam  ED Triage Vitals [02/07/24 1545]   Temperature Pulse Respirations Blood Pressure SpO2   97.8 °F (36.6 °C) 102 22 133/78 92 %      Temp Source Heart Rate Source Patient Position - Orthostatic VS BP Location FiO2 (%)   Oral Monitor Sitting Right arm --      Pain Score       10 - Worst Possible Pain             Orthostatic Vital Signs  Vitals:    02/07/24 1745 02/07/24 1900 02/07/24 2000 02/07/24 2058   BP: 136/81 159/85 146/76 (!) 150/101   Pulse: 94 91 93 (!) 111   Patient Position - Orthostatic VS:           Physical Exam  Vitals and nursing note reviewed.   Constitutional:       General: He is in acute distress.   HENT:      Head: Normocephalic and atraumatic.      Nose: Nose normal.      Mouth/Throat:      Mouth: Mucous membranes are dry.      Pharynx: Oropharynx is clear.   Cardiovascular:      Rate and Rhythm: Normal rate and regular rhythm.      Pulses: Normal pulses.      Heart sounds: Normal heart sounds.   Pulmonary:       Effort: Respiratory distress present.      Breath sounds: Examination of the right-middle field reveals decreased breath sounds. Examination of the right-lower field reveals decreased breath sounds. Examination of the left-lower field reveals decreased breath sounds. Decreased breath sounds present. No wheezing, rhonchi or rales.   Chest:      Chest wall: No tenderness.   Abdominal:      General: Abdomen is flat. Bowel sounds are normal.      Palpations: Abdomen is soft.      Tenderness: There is no abdominal tenderness. There is no guarding or rebound.   Musculoskeletal:         General: Normal range of motion.      Cervical back: Normal range of motion and neck supple.      Right lower leg: No tenderness. No edema.      Left lower leg: No tenderness. No edema.   Skin:     General: Skin is warm and dry.      Findings: No rash.   Neurological:      General: No focal deficit present.      Mental Status: He is alert and oriented to person, place, and time.      Gait: Gait normal.   Psychiatric:         Mood and Affect: Mood normal.         Behavior: Behavior normal.         Thought Content: Thought content normal.         Judgment: Judgment normal.         ED Medications  Medications   HYDROmorphone (DILAUDID) injection 1 mg (1 mg Intravenous Given 2/7/24 1802)   iohexol (OMNIPAQUE) 350 MG/ML injection (MULTI-DOSE) 85 mL (85 mL Intravenous Given 2/7/24 1844)   HYDROmorphone (DILAUDID) injection 1 mg (1 mg Intravenous Given 2/7/24 1955)   sodium chloride 0.9 % bolus 500 mL (500 mL Intravenous New Bag 2/7/24 1956)       Diagnostic Studies  Results Reviewed       Procedure Component Value Units Date/Time    HS Troponin I 2hr [[id number]]  (Normal) Collected: 02/07/24 2000    Lab Status: Final result Specimen: Blood from Arm, Right Updated: 02/07/24 2029     hs TnI 2hr 28 ng/L      Delta 2hr hsTnI 1 ng/L     HS Troponin I 4hr [[id number]]     Lab Status: No result Specimen: Blood     Protime-INR [[id number]]  (Normal)  Collected: 02/07/24 1802    Lab Status: Final result Specimen: Blood from Arm, Right Updated: 02/07/24 1833     Protime 14.3 seconds      INR 1.05    APTT [[id number]]  (Normal) Collected: 02/07/24 1802    Lab Status: Final result Specimen: Blood from Arm, Right Updated: 02/07/24 1833     PTT 36 seconds     HS Troponin 0hr (reflex protocol) [[id number]]  (Normal) Collected: 02/07/24 1802    Lab Status: Final result Specimen: Blood from Arm, Right Updated: 02/07/24 1832     hs TnI 0hr 27 ng/L     B-Type Natriuretic Peptide(BNP) [[id number]]  (Normal) Collected: 02/07/24 1802    Lab Status: Final result Specimen: Blood from Arm, Right Updated: 02/07/24 1831     BNP 57 pg/mL     Comprehensive metabolic panel [[id number]]  (Abnormal) Collected: 02/07/24 1802    Lab Status: Final result Specimen: Blood from Arm, Right Updated: 02/07/24 1824     Sodium 129 mmol/L      Potassium 4.1 mmol/L      Chloride 90 mmol/L      CO2 28 mmol/L      ANION GAP 11 mmol/L      BUN 12 mg/dL      Creatinine 0.62 mg/dL      Glucose 107 mg/dL      Calcium 9.5 mg/dL      AST 25 U/L      ALT 16 U/L      Alkaline Phosphatase 84 U/L      Total Protein 6.7 g/dL      Albumin 3.8 g/dL      Total Bilirubin 0.91 mg/dL      eGFR 105 ml/min/1.73sq m     Narrative:      National Kid[employer] Foundation guidelines for Chronic Kidney Disease (CKD):     Stage 1 with normal or high GFR (GFR > 90 mL/min/1.73 square meters)    Stage 2 Mild CKD (GFR = 60-89 mL/min/1.73 square meters)    Stage 3A Moderate CKD (GFR = 45-59 mL/min/1.73 square meters)    Stage 3B Moderate CKD (GFR = 30-44 mL/min/1.73 square meters)    Stage 4 Severe CKD (GFR = 15-29 mL/min/1.73 square meters)    Stage 5 End Stage CKD (GFR <15 mL/min/1.73 square meters)  Note: GFR calculation is accurate only with a steady state creatinine    CBC and differential [[id number]]  (Abnormal) Collected: 02/07/24 1802    Lab Status: Final result Specimen: Blood from Arm, Right Updated: 02/07/24 1813     WBC  8.23 Thousand/uL      RBC 3.90 Million/uL      Hemoglobin 12.2 g/dL      Hematocrit 34.8 %      MCV 89 fL      MCH 31.3 pg      MCHC 35.1 g/dL      RDW 13.5 %      MPV 8.9 fL      Platelets 219 Thousands/uL      nRBC 0 /100 WBCs      Neutrophils Relative 75 %      Immat GRANS % 2 %      Lymphocytes Relative 14 %      Monocytes Relative 9 %      Eosinophils Relative 0 %      Basophils Relative 0 %      Neutrophils Absolute 6.14 Thousands/µL      Immature Grans Absolute 0.15 Thousand/uL      Lymphocytes Absolute 1.16 Thousands/µL      Monocytes Absolute 0.73 Thousand/µL      Eosinophils Absolute 0.03 Thousand/µL      Basophils Absolute 0.02 Thousands/µL                    CTA ED chest PE Study   Final Result by Rose Grijalva MD (02/07 1941)      No pulmonary embolus.      Persistent large right pleural effusion.      Worsening groundglass opacity in the right middle lobe, of uncertain etiology.      Redemonstration of tumor encasing the distal trachea and right bronchi extending into the anterior mediastinum and lower neck bilaterally encasing and narrowing of the brachiocephalic veins and SVC and occlusion of the right bronchi.      Mild bilateral axillary lymphadenopathy, possibly metastatic.      Liver metastases better shown on prior study.      Multiple bone metastases.            Workstation performed: [id number]               Procedures  ECG 12 Lead Documentation Only    Date/Time: 2/7/2024 3:44 PM    Performed by: Celia Young MD  Authorized by: Celia Young MD    Indications / Diagnosis:  SOB  ECG reviewed by me, the ED Provider: yes    Patient location:  ED  Previous ECG:     Previous ECG:  Unavailable  Interpretation:     Interpretation: abnormal    Rate:     ECG rate:  103    ECG rate assessment: tachycardic    Rhythm:     Rhythm: sinus tachycardia    Ectopy:     Ectopy: PAC    QRS:     QRS axis:  Normal    QRS intervals:  Normal  Conduction:     Conduction: abnormal      Abnormal  conduction: LPFB    ST segments:     ST segments:  Normal  T waves:     T waves: normal          ED Course  ED Course as of 02/07/24 2125 Wed Feb 07, 2024   1855 BNP: 57   1949 CTA ED chest PE Study  IMPRESSION:  No pulmonary embolus.     Persistent large right pleural effusion.     Worsening groundglass opacity in the right middle lobe, of uncertain etiology.     Redemonstration of tumor encasing the distal trachea and right bronchi extending into the anterior mediastinum and lower neck bilaterally encasing and narrowing of the brachiocephalic veins and SVC and occlusion of the right bronchi.     Mild bilateral axillary lymphadenopathy, possibly metastatic.     Liver metastases better shown on prior study.     Multiple bone metastases.   1957 Reached out to [facility] for admission   2004 Updated pt and family on results of CT PE study and plan to admit to [facility] service. They are agreeable to plan. Pt just got dose of pain meds  and is currently doing ok.              HEART Risk Score      Flowsheet Row Most Recent Value   Heart Score Risk Calculator    History 0 Filed at: 02/07/2024 2031   ECG 1 Filed at: 02/07/2024 2031   Age 1 Filed at: 02/07/2024 2031   Risk Factors 0 Filed at: 02/07/2024 2031   Troponin 1 Filed at: 02/07/2024 2031   HEART Score 3 Filed at: 02/07/2024 2031                        SBIRT 22yo+      Flowsheet Row Most Recent Value   Initial Alcohol Screen: US AUDIT-C     1. How often do you have a drink containing alcohol? 0 Filed at: 02/07/2024 1750   2. How many drinks containing alcohol do you have on a typical day you are drinking?  0 Filed at: 02/07/2024 1750   3a. Male UNDER 65: How often do you have five or more drinks on one occasion? 0 Filed at: 02/07/2024 1750   3b. FEMALE Any Age, or MALE 65+: How often do you have 4 or more drinks on one occassion? 0 Filed at: 02/07/2024 1750   Audit-C Score 0 Filed at: 02/07/2024 1750   NAVNEET: How many times in the past year have you...    Used an illegal  drug or used a prescription medication for non-medical reasons? Never Filed at: 02/07/2024 1750            Wells' Criteria for PE      Flowsheet Row Most Recent Value   Wells' Criteria for PE    Clinical signs and symptoms of DVT 0 Filed at: 02/07/2024 2030   PE is primary diagnosis or equally likely 0 Filed at: 02/07/2024 2030   HR >100 0 Filed at: 02/07/2024 2030   Immobilization at least 3 days or Surgery in the previous 4 weeks 0 Filed at: 02/07/2024 2030   Previous, objectively diagnosed PE or DVT 0 Filed at: 02/07/2024 2030   Hemoptysis 0 Filed at: 02/07/2024 2030   Malignancy with treatment within 6 months or palliative 1 Filed at: 02/07/2024 2030   Wells' Criteria Total 1 Filed at: 02/07/2024 2030          Wells' Criteria for DVT      Flowsheet Row Most Recent Value   Wells' Criteria for DVT    Active cancer Treatment or palliation within 6 months 1 Filed at: 02/07/2024 2117   Bedridden recently >3 days or major surgery within 12 weeks 0 Filed at: 02/07/2024 2117   Calf swelling >3 cm compared to the other leg 0 Filed at: 02/07/2024 2117   Entire leg swollen 0 Filed at: 02/07/2024 2117   Collateral (nonvaricose) superficial veins present 0 Filed at: 02/07/2024 2117   Localized tenderness along the deep venous system 0 Filed at: 02/07/2024 2117   Pitting edema, confined to symptomatic leg 0 Filed at: 02/07/2024 2117   Paralysis, paresis, or recent plaster immobilization of the lower extremity 0 Filed at: 02/07/2024 2117   Previously documented DVT 0 Filed at: 02/07/2024 2117   Alternative diagnosis to DVT as likely or more likely 2 Filed at: 02/07/2024 2117   Wells DVT Critera Score -1 Filed at: 02/07/2024 2117            Medical Decision Making  Ddx: PE, Pleural effusion, metastatic disease, back pain    CT PE study shows no PE. Rt pleural effusion.  New O2 requirement of 4L NC due to hypoxia of 87-88% on room air  Initial Troponin 27 and repeat troponin unchanged.   CBC grossly normal  CMP with  hyponatremia  Severe pain secondary to metastatic disease treated with 2 doses of dilaudid 1 mg, and achieved adequate pain relief.   Referred from palliative for admission for radiation oncology consult, pain management consult, and inpatient chemo treatments    Amount and/or Complexity of Data Reviewed  Independent Historian: spouse     Details: Daughter  Labs: ordered. Decision-making details documented in ED Course.  Radiology: ordered. Decision-making details documented in ED Course.    Risk  Prescription drug management.  Decision regarding hospitalization.          Disposition  Final diagnoses:   Intractable pain   Hypoxia   Pleural effusion   Metastatic disease (HCC)     Time reflects when diagnosis was documented in both MDM as applicable and the Disposition within this note       Time User Action Codes Description Comment    2/7/2024  8:06 PM Hannah Celia Rossy Add [R52] Intractable pain     2/7/2024  8:06 PM Hannah Celia Rossy Add [R09.02] Hypoxia     2/7/2024  8:06 PM Hannah Celia Rossy Add [J90] Pleural effusion     2/7/2024  8:06 PM Sergiobianca Celia Rossy Modify [R52] Intractable pain     2/7/2024  8:06 PM Hannah Celia Rossy Modify [R09.02] Hypoxia     2/7/2024  8:06 PM Hannah Celia Rossy Add [C79.9] Metastatic disease (HCC)           ED Disposition       ED Disposition   Admit    Condition   Stable    Date/Time   Wed Feb 7, 2024 2013    Comment   Case was discussed with CINDY and the patient's admission status was agreed to be Admission Status: inpatient status to the service of Dr. Carias  .               Follow-up Information    None         Current Discharge Medication List        CONTINUE these medications which have NOT CHANGED    Details   HYDROmorphone HCl ER 8 MG TB24 Take 1 tablet by mouth in the morning Max Daily Amount: 1 tablet  Qty: 30 tablet, Refills: 0    Associated Diagnoses: Pain of right hip      oxyCODONE-acetaminophen (Percocet) 5-325 mg per tablet Take 1 tablet by mouth every 6  (six) hours as needed for moderate pain Max Daily Amount: 4 tablets  Qty: 60 tablet, Refills: 0    Associated Diagnoses: Bone pain      albuterol (Proventil HFA) 90 mcg/act inhaler Inhale 2 puffs every 6 (six) hours as needed for wheezing  Qty: 6.7 g, Refills: 5    Comments: Substitution to a formulary equivalent within the same pharmaceutical class is authorized.  Associated Diagnoses: Bronchitis           No discharge procedures on file.    PDMP Review         Value Time User    PDMP Reviewed  Yes 2/7/2024  1:08 PM Jb Maynard DO             ED Provider  Attending physically available and evaluated Abdulaziz Gomez. I managed the patient along with the ED Attending.    Electronically Signed by           Celia Young MD  02/07/24 1936

## 2024-02-07 NOTE — ASSESSMENT & PLAN NOTE
Patient with progressive worsening of Shortness of breath, dysphagia, intractable back/leg/and hip pain consistent with findings of metastatic disease to bone.    I did speak with Dr. Kearns as he was in office and scheduled to see patient after my visit. Patient was seen with Dr. Kearns of Medical Oncology. Thorough discussion held along with extensive review of PET CT findings along with review of biopsy results concerning for lung adenocarcinoma.    -Reviewed patient's concerning signs and symptoms in which we recommended evaluation at the ER to then be admitted for cancer pain management, radiation oncology evaluation, and potentially inpatient chemotherapy.    Discussed that therapy would not be curative, but palliative due to the nature and spread of his disease.    ADT 21 placed for [facility].  -Patient has been on Perocet 5-325mg which he has only taken a maximum of 3 tabs a day since he was prescribed this medication (with little to no effect).  -Also on PO ER dilaudid  -Discussed plans of discontinuing ER Dilaudid and work on immediate release Opioid dosing which would provide him effective pain management. However, due to the nature of his ongoing pain, will await his admission and plan to see him in the hospital for further pain regimen adjustments.

## 2024-02-07 NOTE — NURSING NOTE
PET/CT pre and post instructions reviewed with patient. This includes NO cell phone use during one-hour FDG uptake phase, patient verbalizes understanding.

## 2024-02-07 NOTE — PROGRESS NOTES
Daughter requesting MRI results for palliative care appointment this afternoon.  Reviewed chart patient also has consult with Dr. Kearns.  Phone call to Francisco in Radiology reading room advised patient has appointment with palliative and consult with Dr. Kearns if we could possible have MRI results prior to appointments that would be great. Advised Dr. Kearns would definitely benefit from these results. Francisco verbalized understanding states he will do his best to get MRI read. ONN expressed appreciated. Daughter notified.

## 2024-02-07 NOTE — PROGRESS NOTES
Palliative Outpatient Assessment of Need    LSW completed an assessment of need which was completed with (patient, family, or both) in the office or via phone/video conference    Relationship status:    Duration of relationship: 42 years  Name of significant other: Malgorzata  Children and Ages: 1 dtr and 1 son  Pets: 5 sheep  Other important family information: Children are local and supportive  Living situation (where and whom): Resides with spouse    Patient's primary caregiver: Wife has been assisting with ADLs  Any limitations of caregiver:  Environmental concerns or barriers:   history: None  Employment history/source of income: Had been working full-time as an  until very recent  Disability: Pt/family interested in applying as they anticipate pt will be losing his health insurance and unable to work. LSW provided information/resources and that LSW can assist as needed. Applying for Medical Assistance also reviewed and information provided.   Concerns regarding literacy: None  Spirituality/ Spiritism: Born Again [employer]    Patient's strengths, social supports, and resources: Supportive family  Cultural information:   Mental Health current or previous: None  Substance use or history: Current smoker  Sleep: Interrupted due to pain  Exercise: Limited due to pain and shortness of breath  Diet/nutrition: Weight-loss  Durable Medical Equipment needs: Cane  Transportation: Family transports  Financial concerns: Pt has been sole source of income for household. Discussed options of applying for medical assistance and disability. Information provided to family  Advanced Directive: [place] HCR/AD document completed during visit today. Pt has designated dtr Soniya as substitute decision-maker  Other medical or social work providers involved: Oncology  Patient/caregiver current level of coping: Despite receiving difficult information about his CA dx, but appears to be coping well overall at this time and is  focused on beginning CA tx  Understanding: Pt understanding of current medical status  Patient/family concerns and areas of need: Pain; Shortness of breath; Difficulty ambulating  Patient's interests:     I have spent 45 minutes with Patient and family today in which greater than 50% of this time was spent in counseling/coordination of care.    *All questions may not be answered due to constraints.  Follow-up discussions may need to occur    PSC team and Dr. Kearns met with pt and family in office. Dr. Kearns spent time reviewing pt's imaging with pt/family and discussed potential next steps for CA tx vs hospice. Pt/family in agreement with pursuing all offered CA tx. Pt will be going directly to ED for inpt admission with possibility for RadOnc and chemo initiation.

## 2024-02-08 NOTE — ASSESSMENT & PLAN NOTE
Patient known to Dr. Maynard of Palliative care who referred patient to present to ED due to worsening shortness of breath and intractable pain. Patient endorses back pain with radiation to left lower extremity.  PTA pain regimen:   8 mg Hydrocodone ER daily (non-form) -- will change to scheduled 2 mg of PO Hydrocodone q6h.   Percocet 5-325 mg q6h prn for moderate pain -- will change to 2.5 mg of Oxycodone q6h prn for moderate pain, 5 mg of Oxycodone q6h prn for severe pain and 0.2 mg of IV Dilaudid q6h prn for breakthrough pain.  Palliative care consult.

## 2024-02-08 NOTE — ASSESSMENT & PLAN NOTE
Sodium level upon admission: 129  Secondary to poor oral intake likely SIADH in the setting of extensive pulmonary tumor burden..  TSH and cortisol within normal limits.  Additional Studies: Follow-up on serum osmolality, urine osmolality, sodium urine, uric acid .  Monitor BMP.  Appears euvolemic.  DC further IV fluids

## 2024-02-08 NOTE — ASSESSMENT & PLAN NOTE
Patient reports smoking 1 PPD previously, he endorses that he is now down to 3-4 cigarettes daily.  Encouraged smoking cessation.  NRT.

## 2024-02-08 NOTE — ASSESSMENT & PLAN NOTE
Ongoing pain despite intervention by patient's PCP with opioid medications including ER Dilaudid and Percocet 5-325mg.    Patient is unable to sleep due to his pain that is located along his chest, sternum, lower back, hips, and left thigh and calf.    -Recommend patient to report to ER for evaluation and admission for further management and monitored cancer-related pain management.

## 2024-02-08 NOTE — CONSULTS
Consultation - Palliative & Supportive Care   Abdulaziz Gomez  63 y.o.  male  S /S -01   MRN: [id number]  Encounter: [id number]    ASSESSMENT:  Adenocarcinoma of the lung, non-small cell lung cancer  Metastatic disease to Bone  Cancer-related pain  Right Pleural Effusion  Weight Loss, Dysphagia  Palliative Care Patient  Goals of Care Discussion    PLAN:  1. Goals:   Full Code - Level 1   Full disease directed cares  Patient open to chemotherapy and radiation therapies as offered for cancer treatment and metastatic disease  Palliative will follow for ongoing goals of care discussions as situation evolves.    2. Social Support:  Supportive listening provided  Normalized experience of patient/family  Provided anxiety containment    3. Symptom management:  Cancer-related pain  Adjustments to pain regimen as below  Scheduled medications:  -PO Dilaudid 2 mg q8 hours ATC    PRN medications  -PO Dilaudid 2 mg q4 hours PRN for moderate pain  -PO Dilaudid 4 mg q4 hours PRN for severe pain  -IV dilaudid 0.2 mg q4 hours PRN for BTP    All of the above with hold parameters for precautions  Narcan on order for concerns for respiratory depression or need for opioid reversal    Bowel regimen to avoid OIC  Patient with constipation prior to admission (day 4 without bowel movement but is passing flatus)  Start Senokot 8.6 mg qHS   Miralax 17g daily PRN      4. Follow-up  We appreciate the opportunity to participate in this patient's care.   We will continue to follow. PSC to follow up.  Please do not hesitate to contact our on-call provider through our clinic answering service at [phone] should there be an acute change or other symptom control concerns.    Code status: Level 1 - Full Code   Decisional apparatus:  Patient does have capacity to make medical decisions on my exam today. If such capacity is lost, patient's substitute decision maker would default to wife, by [place] Act 169. However, patient completed  ACP  paperwork yesterday on 2/7/2024 indicating patient's wishes to have daughter as substitute healthcare agent.   Advance Directive / Living Will / POLST:  Yes    I have reviewed the patient's controlled substance dispensing history in the Prescription Drug Monitoring Program in compliance with the [facility] regulations before prescribing any controlled substances.    IDENTIFICATION:  Inpatient consult to Palliative Care  Consult performed by: Jb Maynard DO  Consult ordered by: MARY Sampson        Reason for Consult / Principal Problem: Symptom management / cancer related pain    HISTORY OF PRESENT ILLNESS:    Abdulaziz Gomez is a 63 y.o. male with recent diagnosis of stage IV adenocarcinoma of the lung (non-small cell lung cancer by biopsy) who presented to [facility] clinic yesterday with progressive worsening of shortness of breath, dysphagia, intractable back and left thigh pain. Patient with PET CT performed on 2/7/2024 was reviewed with the patient and family in which findings of large pleural effusion was noted to the right lung which was consistent with patient's worsening dyspnea. Patient was referred to [facility] ER with subsequent admission with plans for management of pleural effusion, radiation oncology evaluation, and medical oncology evaluation as well as plans for inpatient chemotherapy initiation.   Palliative Care consulted for pain management.     Patient seen and examined this morning with wife and daughter at bedside. Patient is in no acute distress as he is resting comfortably sitting up in bed. Breathing is non-labored, however, some dyspnea with conversation. Patient states his breathing has improved since his thoracentesis this morning which yielded 1 liter of fluid. He states his pain, particularly his back and left thigh pain has improved with the current pain regimen. We discussed options for pain management as well and he was in agreement. He has tolerated the Oxycodone and  Dilaudid without confusion, oversedation, or signs of respiratory depression. Patient has been on Percocet/Oxycodone with little to no relief prior to admission and has had higher dose of OxyIR of 10mg available while in the hospital with little to no improvement. Patient appears to have improvement with dilaudid.     Interview and exam limited by: None - patient demonstrates medical decision making capacity.    Office Consult from 2/8/2024  Abdulaziz Gomez is a 63 y.o. male who presents as a referral from Dr. Vega of radiation oncology for primary palliative diagnosis of lung cancer (NSLC by biopsy).  Consultation is requested for: symptom management, disease process education and discussion of prognosis, advance care planning, emotional support in the setting of serious illness.              Patient presents today with wife and daughter. He is in notable discomfort due to his pain and is dyspneic at rest which is worsened with conversation. He is favoring his left thigh due to the pain he is experiencing. He endorses progressive worsening of shortness of breath, particularly worsened over the past 2 days where he has felt scared about being able to breath and catch his breath. He has ongoing intractable pain to his back and left leg with no improvement with pain regimen started by his PCP. He also has had constipation with these opioids. Last bowel movement was 3-4 days ago. He also endorses worsening dysphagia where he is having trouble with swallowing solid foods. His appetite has decreased precipitously with continued weight loss of 7 pounds in the past 1-2 weeks.               Patient had recent PT Scan completed today and this was reviewed with patient including findings of large pleural effusion of the right. I did have my concerns and recommended patient to go to the ER for evaluation. I did also speak with Dr. Kearns of Medical Oncology who was scheduled to see the patient later today and he did visit  the patient with me. Extensive review of patient's imaging were discussed concerning for widespread metastatic disease involving the spine, hips, and liver. Reviewed treatments including chemotherapy and potentially radiation that would be Palliative and not curative. Dr. Kearns was in agreement with patient to be evaluated in the ER and admitted to the hospital for plans for initiation of chemotherapy, radiation oncology evaluation for potential intervention to the spine, and cancer related pain management.              ADT21 placed. Patient and family agree to report to [facility] ED for evaluation. Palliative  assisted and spoke with patient and family. ACP was reviewed and completed in which patient would want his daughter to serve as MDM in the event he lacks medical decision making capacity.    NM PET Scan 2/7/2024  1. FDG avid intrathoracic disease most extensive at the superior mediastinum and right perihilar region concerning for malignancy.  2. Extensive FDG avid peggy disease in the lower neck, chest and abdomen suspicious for metastasis.  3. Scattered FDG-avid hepatic lesions concerning for metastasis.  4. Innumerable FDG avid osseous lesions compatible with widespread osseous metastasis.  5. A few small foci of FDG uptake along the left anterior thigh musculature for which intramuscular metastasis is not excluded.        Biopsy 1/23/2024  Final Diagnosis   A.  Sternum, biopsy:  Metastatic non-small cell carcinoma.      MRI Abdomen 1/27/2024  In segment 4B of the liver, there is a 1.1 cm mildly T2 hyperintense, and hypoenhancing liver lesion suspicious for metastasis (series 7 image 19, series 11 images 60-63, images 178-183.)     Similarly, there are 2 additional adjacent lesions in the right hepatic dome, each 1 cm, also suspicious for metastatic disease (series 7 image 9 and series 11 image 30, 150.)        CT Chest 1/11/2024  1. There is a destructive mass centered in the right upper-anterior  mediastinum, with resultant destruction of the manubrium and sternum, and adjacent right medial pleural thickening that is likely involved. The mass also circumscribes and narrows the right brachiocephalic vein, SVC and left brachiocephalic vein.  The mass extends inferiorly and posteriorly to involve the right paratracheal and subcarinal mediastinum, as well as the right hilum. Findings are highly suspicious for malignant involvement, of which differential considerations include primary lung cancer, sarcoma, or lymphoma. Further management considerations may include subspecialty consultation, tissue sampling, and/or FDG PET/CT.  2. Right supraclavicular lymphadenopathy, suspicious for peggy involvement. In addition to the mediastinal/right hilar direct infiltration by the mass as described above, there is another discrete/separate 1.1 cm right hilar lymph node that is also   likely involved.  3. 2 mm right upper lobe nodule, and a 3 mm right upper lobe groundglass nodule. Recommend attention to on subsequent follow-up.  3. Small right and trace left pleural effusions, similar to the chest radiograph of 1/3/2024.  4. There are a few subcentimeter low-attenuation hepatic lesions, which are too small to characterize. However, metastatic disease is not excluded in light of the above. Consider further evaluation with abdominal MRI.  5. 1.5 cm intermediate attenuation lesion arising from the posterior right kidney, which is incompletely characterized on this single phase postcontrast examination. This can be evaluated on MRI as recommended above.     XR Chest 1/3/2024  7 cm masslike opacity in the high right paratracheal region. Recommend further evaluation with a chest CT with contrast to exclude malignancy.  Small right and trace left effusion.      Review of Systems   Constitutional:  Negative for chills, fatigue and fever.   HENT:  Positive for trouble swallowing. Negative for sore throat.    Eyes:  Negative for  "visual disturbance.   Respiratory:  Positive for shortness of breath (improving). Negative for cough.    Cardiovascular:  Negative for chest pain and leg swelling.   Gastrointestinal:  Positive for constipation. Negative for abdominal pain, diarrhea, nausea and vomiting.   Genitourinary:  Negative for dysuria and hematuria.   Musculoskeletal:  Positive for back pain (along the spine and bilateral shoulders), myalgias (left thigh) and neck stiffness (neck is \"tight\" but does not want trial of muscle relaxers, warm compresses at this time.). Negative for arthralgias.   Neurological:  Positive for weakness. Negative for light-headedness and headaches.   Psychiatric/Behavioral:  Positive for sleep disturbance (patient was able to sleep for 3 hours last night which has been an improvement compared to the past several days/week). Negative for confusion.    All other systems reviewed and are negative.      Past Medical History:   Diagnosis Date    Metastatic non-small cell lung cancer (HCC)      Past Surgical History:   Procedure Laterality Date    IR BIOPSY BONE  1/23/2024    TONSILLECTOMY Bilateral      Social History     Socioeconomic History    Marital status: /Civil Union     Spouse name: Not on file    Number of children: Not on file    Years of education: Not on file    Highest education level: Not on file   Occupational History    Not on file   Tobacco Use    Smoking status: Every Day     Current packs/day: 1.00     Average packs/day: 1 pack/day for 50.1 years (50.1 ttl pk-yrs)     Types: Cigarettes     Start date: 1974     Passive exposure: Past    Smokeless tobacco: Never   Vaping Use    Vaping status: Never Used   Substance and Sexual Activity    Alcohol use: No    Drug use: No    Sexual activity: Not on file   Other Topics Concern    Not on file   Social History Narrative    Not on file     Social Determinants of Health     Financial Resource Strain: Not on file   Food Insecurity: Not on file "   Transportation Needs: Not on file   Physical Activity: Not on file   Stress: Not on file   Social Connections: Not on file   Intimate Partner Violence: Not on file   Housing Stability: Not on file     Family History   Problem Relation Age of Onset    Diabetes Mother     Heart disease Brother        MEDICATIONS / ALLERGIES:  all current active meds have been reviewed, current meds:   Current Facility-Administered Medications   Medication Dose Route Frequency    acetaminophen (TYLENOL) tablet 650 mg  650 mg Oral Q6H PRN    enoxaparin (LOVENOX) subcutaneous injection 40 mg  40 mg Subcutaneous Daily    HYDROmorphone (DILAUDID) tablet 2 mg  2 mg Oral Q8H SHANTAL    HYDROmorphone (DILAUDID) tablet 2 mg  2 mg Oral Q4H PRN    Or    HYDROmorphone (DILAUDID) tablet 4 mg  4 mg Oral Q4H PRN    HYDROmorphone HCl (DILAUDID) injection 0.2 mg  0.2 mg Intravenous Q4H PRN    methylPREDNISolone sodium succinate (Solu-MEDROL) injection 40 mg  40 mg Intravenous Q8H SHANTAL    naloxone (NARCAN) 0.04 mg/mL syringe 0.04 mg  0.04 mg Intravenous Q1MIN PRN    nicotine (NICODERM CQ) 7 mg/24hr TD 24 hr patch 1 patch  1 patch Transdermal Daily    polyethylene glycol (MIRALAX) packet 17 g  17 g Oral Daily PRN    senna (SENOKOT) tablet 8.6 mg  1 tablet Oral HS   , and PTA meds:   Prior to Admission Medications   Prescriptions Last Dose Informant Patient Reported? Taking?   HYDROmorphone HCl ER 8 MG TB24 2/7/2024  No Yes   Sig: Take 1 tablet by mouth in the morning Max Daily Amount: 1 tablet   albuterol (Proventil HFA) 90 mcg/act inhaler Not Taking Self No No   Sig: Inhale 2 puffs every 6 (six) hours as needed for wheezing   Patient not taking: Reported on 1/3/2024   oxyCODONE-acetaminophen (Percocet) 5-325 mg per tablet 2/6/2024  No Yes   Sig: Take 1 tablet by mouth every 6 (six) hours as needed for moderate pain Max Daily Amount: 4 tablets      Facility-Administered Medications: None       No Known Allergies    OBJECTIVE:  /84   Pulse 95   Temp  97.5 °F (36.4 °C) (Oral)   Resp 16   Wt 61.1 kg (134 lb 11.2 oz)   SpO2 92%   BMI 19.89 kg/m²   Nursing notes reviewed.  Physical Exam  Vitals reviewed.   Constitutional:       General: He is not in acute distress.     Appearance: He is well-developed. He is ill-appearing. He is not toxic-appearing or diaphoretic.      Comments: Thin body habitus.   HENT:      Head: Normocephalic and atraumatic.      Nose: Nose normal.      Mouth/Throat:      Mouth: Mucous membranes are moist.   Eyes:      General: No scleral icterus.        Right eye: No discharge.         Left eye: No discharge.   Cardiovascular:      Rate and Rhythm: Normal rate.   Pulmonary:      Effort: No respiratory distress.      Comments: Some dyspnea with conversation (much improved compared to my interaction with him in the office yesterday). No accessory muscle use noted. Slight tachypnea with conversation but improves with rest.  Abdominal:      General: Abdomen is flat. There is no distension.      Palpations: Abdomen is soft.      Tenderness: There is no abdominal tenderness.   Musculoskeletal:         General: No swelling.      Right lower leg: No edema.      Left lower leg: No edema.   Skin:     General: Skin is warm and dry.      Coloration: Skin is not jaundiced or pale.   Neurological:      General: No focal deficit present.      Mental Status: He is alert and oriented to person, place, and time. Mental status is at baseline.   Psychiatric:         Mood and Affect: Mood normal.         Behavior: Behavior normal.         Thought Content: Thought content normal.         Judgment: Judgment normal.         Lab Results: I have personally reviewed pertinent labs.    HEMATOLOGY PROFILE:  Results from last 7 days   Lab Units 02/08/24  0532 02/07/24  1802   WBC Thousand/uL  --  8.23   HEMOGLOBIN g/dL  --  12.2   HEMATOCRIT %  --  34.8*   PLATELETS Thousands/uL 240 219   NEUTROS PCT %  --  75   MONOS PCT %  --  9   EOS PCT %  --  0       CHEMISTRY  "PROFILE:  Results from last 7 days   Lab Units 02/07/24  1802   SODIUM mmol/L 129*   POTASSIUM mmol/L 4.1   CHLORIDE mmol/L 90*   CO2 mmol/L 28   BUN mg/dL 12   CREATININE mg/dL 0.62   ALBUMIN g/dL 3.8   CALCIUM mg/dL 9.5   ALK PHOS U/L 84   ALT U/L 16   AST U/L 25       Albumin:  0   Lab Value Date/Time    ALB 3.8 02/07/2024 1802     Imaging Studies: I have personally reviewed pertinent reports.  EKG, Pathology, and Other Studies: I have personally reviewed pertinent reports.    Counseling / Coordination of Care:  Total floor / unit time spent today 65 minutes. Greater than 50% of total time was spent with the patient and / or family counseling and / or coordination of care. A description of the counseling / coordination of care: symptom assessment and management, medication review, medication adjustment, psychosocial support, chart review, imaging review, lab review, goals of care, opioid titration, supportive listening, and anticipatory guidance.    Jb Maynard DO  [facility]  [phone]    Portions of this document may have been created using dictation software and as such some \"sound alike\" terms may have been generated by the system. Do not hesitate to contact me with any questions or clarifications.    "

## 2024-02-08 NOTE — ASSESSMENT & PLAN NOTE
Patient known to Dr. Maynard of Palliative care who referred patient to present to ED due to worsening shortness of breath and intractable pain. Patient endorses back pain with radiation to left lower extremity.  Seen by palliative medicine and adjusted pain regimen :  Scheduled medications:  -PO Dilaudid 2 mg q8 hours ATC     PRN medications  -PO Dilaudid 2 mg q4 hours PRN for moderate pain  -PO Dilaudid 4 mg q4 hours PRN for severe pain  -IV dilaudid 0.2 mg q4 hours PRN for BTP     Palliative care consult appreciated.  Continue with bowel regimen.

## 2024-02-08 NOTE — ASSESSMENT & PLAN NOTE
"Progressive shortness of breath that has worsened over the past 2-3 days. States he is trying to constantly catch his breath and it is \"getting scary\" with his worsening shortness of breath.  "

## 2024-02-08 NOTE — ASSESSMENT & PLAN NOTE
"Presentation: Patient presents after referral from outpatient palliative care provider due to complaints of worsening shortness of breath and worsening pain associated with recent diagnosis of lung cancer with metastases.   Suspect multifactorial in the setting of  large right pleural effusion and underlying lung cancer with large tumor burden and tumor encasing the trachea and bronchi.    CTA PE study: \"No pulmonary embolus. Persistent large right pleural effusion. Worsening groundglass opacity in the right middle lobe, of uncertain etiology. Redemonstration of tumor encasing the distal trachea and right bronchi extending into the anterior mediastinum and lower neck bilaterally encasing and narrowing of the brachiocephalic veins and SVC and occlusion of the right bronchi.\"  Upon ED arrival, SpO2 87% on room air.  Currently SpO2 95% on 6 L NC.  Wean supplemental O2 to maintain SpO2 > 88%.  Respiratory protocol.  Pulmonology consult --appreciated.    Patient is s/p thoracentesis of 1 L.  Wean and titrate oxygen as tolerated.  "

## 2024-02-08 NOTE — ASSESSMENT & PLAN NOTE
Due to suspected oropharyngeal dysphagia. Trouble with swallowing solid foods. Patient is limited to soft foods or liquids. Sensation and discomfort with swallowing at the throat.

## 2024-02-08 NOTE — ASSESSMENT & PLAN NOTE
Large right pleural effusion noted on CTA PE study.  No prior thoracentesis.  S/p thoracentesis with 1 L of fluid removal.  Follow up and fluid analysis and cytology results.  If has recurrent rapidly accumulating pleural effusion, may benefit from getting Pleurx catheter placed.

## 2024-02-08 NOTE — ASSESSMENT & PLAN NOTE
Shortness of breath likely 2/2 to large pleural effusion.  PET CT from today, 2/7/2024 reviewed.    CT images: Moderate to large right pleural effusion, increased from prior CT. Stable small to moderate pericardial effusion. Scattered emphysematous changes of the lung fields.

## 2024-02-08 NOTE — ASSESSMENT & PLAN NOTE
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  CTA PE study: \"Redemonstration of tumor encasing the distal trachea and right bronchi extending into the anterior mediastinum and lower neck bilaterally encasing and narrowing of the brachiocephalic veins and SVC and occlusion of the right bronchi. Mild bilateral axillary lymphadenopathy, possibly metastatic. Liver metastases better shown on prior study.\"  Patient reports 7 lb weight loss in the past few weeks.  Oncology consult.  Rad-Onc consult.  "

## 2024-02-08 NOTE — H&P
"[facility]  H&P  Name: Abdulaziz Gomez 63 y.o. male I MRN: [id number]  Unit/Bed#: S -01 I Date of Admission: 2/7/2024   Date of Service: 2/8/2024 I Hospital Day: 1      Assessment/Plan   * Acute respiratory failure with hypoxia (HCC)  Assessment & Plan  Presentation: Patient presents after referral from outpatient palliative care provider due to complaints of worsening shortness of breath and worsening pain associated with recent diagnosis of lung cancer with metastases.   Suspect multifactorial in the setting of underlying undiagnosed COPD, large right pleural effusion and lung tumor burden.  CTA PE study: \"No pulmonary embolus. Persistent large right pleural effusion. Worsening groundglass opacity in the right middle lobe, of uncertain etiology. Redemonstration of tumor encasing the distal trachea and right bronchi extending into the anterior mediastinum and lower neck bilaterally encasing and narrowing of the brachiocephalic veins and SVC and occlusion of the right bronchi.\"  Upon ED arrival, SpO2 87% on room air.  Currently SpO2 95% on 5 L NC.  Wean supplemental O2 to maintain SpO2 > 88%.  IV Solu-medrol 40 mg q8h.  Respiratory protocol.  Pulmonology consult -- likely need for thoracentesis     Metastatic non-small cell lung cancer (HCC)  Assessment & Plan  Patient recently diagnosed with 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  CTA PE study: \"Redemonstration of tumor encasing the distal trachea and right bronchi extending into the anterior mediastinum and lower neck bilaterally encasing and narrowing of the brachiocephalic veins and SVC and occlusion of the right bronchi. Mild bilateral axillary lymphadenopathy, possibly metastatic. Liver metastases better shown on prior study.\"  Patient reports 7 lb weight loss in the past few weeks.  Oncology consult.  Rad-Onc consult.    Cancer related pain  Assessment & Plan  Patient known to Dr. Maynard of Palliative " care who referred patient to present to ED due to worsening shortness of breath and intractable pain. Patient endorses back pain with radiation to left lower extremity.  PTA pain regimen:   8 mg Hydrocodone ER daily (non-form) -- will change to scheduled 2 mg of PO Hydrocodone q6h.   Percocet 5-325 mg q6h prn for moderate pain -- will change to 2.5 mg of Oxycodone q6h prn for moderate pain, 5 mg of Oxycodone q6h prn for severe pain and 0.2 mg of IV Dilaudid q6h prn for breakthrough pain.  Palliative care consult.    Hyponatremia  Assessment & Plan  Sodium level upon admission: 129  Secondary to poor oral intake.  Treatment: IV hydration.  Additional Studies: TSH, serum osmolality, urine osmolality, sodium urine, uric acid and cortisol level in AM.  Monitor BMP.    Pleural effusion on right  Assessment & Plan  Large right pleural effusion noted on CTA PE study.  No prior thoracentesis.  Pulmonology versus IR -- will consult Pulmonology first.    Current every day smoker  Assessment & Plan  Patient reports smoking 1 PPD previously, he endorses that he is now down to 3-4 cigarettes daily.  Encouraged smoking cessation.  NRT.         VTE Pharmacologic Prophylaxis: VTE Score: 6 High Risk (Score >/= 5) - Pharmacological DVT Prophylaxis Ordered: enoxaparin (Lovenox). Sequential Compression Devices Ordered.  Code Status: Level 1 - Full Code per patient  Discussion with family: Patient declined call to .     Anticipated Length of Stay: Patient will be admitted on an inpatient basis with an anticipated length of stay of greater than 2 midnights secondary to acuter respiratory failure and pain control.    Total Time Spent on Date of Encounter in care of patient: 70 mins. This time was spent on one or more of the following: performing physical exam; counseling and coordination of care; obtaining or reviewing history; documenting in the medical record; reviewing/ordering tests, medications or procedures;  communicating with other healthcare professionals and discussing with patient's family/caregivers.    Chief Complaint: Shortness of breath, intractable pain    History of Present Illness:  Abdulaziz Gomez is a 63 y.o. male with a PMH of metastatic non-small cell lung cancer and daily cigarette smoker who presents after referral from outpatient palliative care provider due to complaints of worsening shortness of breath and worsening pain associated with recent diagnosis of lung cancer with metastases. Patient endorses back pain with radiation to left lower extremity. Patient reports shortness of breath is worse with movement. He endorses wheezing. He reports that he formerly smoked 1 PPD, he states he has cut down to 3-4 cigarettes per day.    Patient will be admitted for acute respiratory failure and pain control.    Review of Systems:  Review of Systems   Constitutional:  Positive for activity change. Negative for chills and fever.   Respiratory:  Positive for cough, chest tightness, shortness of breath and wheezing.    Cardiovascular:  Negative for chest pain and palpitations.   Gastrointestinal:  Negative for diarrhea, nausea and vomiting.   All other systems reviewed and are negative.      Past Medical and Surgical History:   Past Medical History:   Diagnosis Date    Metastatic non-small cell lung cancer (HCC)        Past Surgical History:   Procedure Laterality Date    IR BIOPSY BONE  1/23/2024    TONSILLECTOMY Bilateral        Meds/Allergies:  Prior to Admission medications    Medication Sig Start Date End Date Taking? Authorizing Provider   HYDROmorphone HCl ER 8 MG TB24 Take 1 tablet by mouth in the morning Max Daily Amount: 1 tablet 2/1/24  Yes Genesis Leonard MD   oxyCODONE-acetaminophen (Percocet) 5-325 mg per tablet Take 1 tablet by mouth every 6 (six) hours as needed for moderate pain Max Daily Amount: 4 tablets 1/24/24  Yes Genesis Leonard MD   albuterol (Proventil HFA) 90 mcg/act inhaler Inhale 2 puffs  every 6 (six) hours as needed for wheezing  Patient not taking: Reported on 1/3/2024 10/26/23   Genesis Leonard MD     I have reviewed home medications with patient personally.    Allergies: No Known Allergies    Social History:  Marital Status: /Civil Union   Occupation: Unknown  Patient Pre-hospital Living Situation: Home  Patient Pre-hospital Level of Mobility: walks  Patient Pre-hospital Diet Restrictions: None  Substance Use History:   Social History     Substance and Sexual Activity   Alcohol Use No     Social History     Tobacco Use   Smoking Status Every Day    Current packs/day: 1.00    Average packs/day: 1 pack/day for 50.1 years (50.1 ttl pk-yrs)    Types: Cigarettes    Start date: 1974    Passive exposure: Past   Smokeless Tobacco Never     Social History     Substance and Sexual Activity   Drug Use No       Family History:  Family History   Problem Relation Age of Onset    Diabetes Mother     Heart disease Brother        Physical Exam:     Vitals:   Blood Pressure: 150/82 (02/07/24 2347)  Pulse: 91 (02/07/24 2347)  Temperature: 97.7 °F (36.5 °C) (02/07/24 2347)  Temp Source: Oral (02/07/24 1545)  Respirations: 20 (02/07/24 2000)  SpO2: 93 % (02/07/24 2347)    Physical Exam  Vitals and nursing note reviewed.   Constitutional:       General: He is not in acute distress.     Appearance: He is ill-appearing (chronically).   HENT:      Head: Normocephalic.      Nose: Nose normal.      Mouth/Throat:      Pharynx: Oropharynx is clear.      Comments: Hoarse voice  Eyes:      General: No scleral icterus.     Conjunctiva/sclera: Conjunctivae normal.   Cardiovascular:      Rate and Rhythm: Normal rate and regular rhythm.      Pulses: Normal pulses.      Heart sounds: Normal heart sounds.   Pulmonary:      Effort: No tachypnea or respiratory distress.      Breath sounds: Decreased breath sounds and wheezing present. No rhonchi or rales.   Abdominal:      General: Bowel sounds are normal. There is no distension.       Palpations: Abdomen is soft.   Musculoskeletal:         General: No swelling or deformity. Normal range of motion.      Cervical back: Normal range of motion.   Skin:     General: Skin is warm and dry.      Capillary Refill: Capillary refill takes 2 to 3 seconds.   Neurological:      General: No focal deficit present.      Mental Status: He is alert and oriented to person, place, and time.          Additional Data:     Lab Results:  Results from last 7 days   Lab Units 02/07/24  1802   WBC Thousand/uL 8.23   HEMOGLOBIN g/dL 12.2   HEMATOCRIT % 34.8*   PLATELETS Thousands/uL 219   NEUTROS PCT % 75   LYMPHS PCT % 14   MONOS PCT % 9   EOS PCT % 0     Results from last 7 days   Lab Units 02/07/24  1802   SODIUM mmol/L 129*   POTASSIUM mmol/L 4.1   CHLORIDE mmol/L 90*   CO2 mmol/L 28   BUN mg/dL 12   CREATININE mg/dL 0.62   ANION GAP mmol/L 11   CALCIUM mg/dL 9.5   ALBUMIN g/dL 3.8   TOTAL BILIRUBIN mg/dL 0.91   ALK PHOS U/L 84   ALT U/L 16   AST U/L 25   GLUCOSE RANDOM mg/dL 107     Results from last 7 days   Lab Units 02/07/24  1802   INR  1.05     Results from last 7 days   Lab Units 02/07/24  0752   POC GLUCOSE mg/dl 83               Lines/Drains:  Invasive Devices       Peripheral Intravenous Line  Duration             Peripheral IV 02/07/24 Right Antecubital <1 day                        Imaging: Reviewed radiology reports from this admission including: chest CT scan  CTA ED chest PE Study   Final Result by Rose Grijalva MD (02/07 1941)      No pulmonary embolus.      Persistent large right pleural effusion.      Worsening groundglass opacity in the right middle lobe, of uncertain etiology.      Redemonstration of tumor encasing the distal trachea and right bronchi extending into the anterior mediastinum and lower neck bilaterally encasing and narrowing of the brachiocephalic veins and SVC and occlusion of the right bronchi.      Mild bilateral axillary lymphadenopathy, possibly metastatic.      Liver  metastases better shown on prior study.      Multiple bone metastases.            Workstation performed: [id number]             EKG and Other Studies Reviewed on Admission:   EKG: Sinus Tachycardia. , with PVCs.    ** Please Note: This note has been constructed using a voice recognition system. **

## 2024-02-08 NOTE — PROGRESS NOTES
Oncology Consult Note  Abdulaziz Gomez 63 y.o. male MRN: [id number]  Unit/Bed#: ? Encounter: [id number]      Presenting Complaint: Stage IV adenocarcinoma of the lung with diffuse bony metastasis    History of Presenting Illness: Abdulaziz Gomez is seen for initial consultation 2/7/2024 at the referral of No ref. provider found   63-year-old male with history of COPD, chronic cough, cervical pain second CT scan of the chest on 1/11/2024 showed destructive mass in the right upper anterior mediastinum with destruction of the manubrium, sternum, right medial pleural thickening, narrowing of the right brachiocephalic vein, SVC, left brachiocephalic vein with extension into subcarinal mediastinum, right hilum, right supraclavicular lymphadenopathy, right pleural effusion, PET scan on 2/7/2024 showed intrathoracic disease extensive in the superior mediastinum, right perihilar area, scattered hepatic lesions concerning for metastasis, multiple and innumerable osseous lesions with widespread metastasis, FDG uptake in the left anterior thigh musculature    Biopsy of the sternum showed poorly differentiated adenocarcinoma of the lung positive for CK7, CK19, PAX8, CK5/6, GATA3, negative for CK20, TTF-1, Napsin A, p40, calretinin, WT1, uroplakin and CA 19.9    MRI of the brain showed no intracranial metastatic disease, 5 mm right parietal calvarium lesion most likely consistent with metastatic disease    The patient has been complaining of lower back pain, numbness into his right lower extremity, no evidence of incontinence or constipation    He had been losing weight, hoarseness, exertional dyspnea    Heavy smoker used to smoke 1 pack of cigarette daily for many years, drinks alcohol occasionally      Review of Systems - As stated in the HPI otherwise the fourteen point review of systems was negative.    Past Medical History:   Diagnosis Date   • Metastatic non-small cell lung cancer (HCC)        Social History      Socioeconomic History   • Marital status: /Civil Union     Spouse name: Not on file   • Number of children: Not on file   • Years of education: Not on file   • Highest education level: Not on file   Occupational History   • Not on file   Tobacco Use   • Smoking status: Every Day     Current packs/day: 1.00     Average packs/day: 1 pack/day for 50.1 years (50.1 ttl pk-yrs)     Types: Cigarettes     Start date: 1974     Passive exposure: Past   • Smokeless tobacco: Never   Vaping Use   • Vaping status: Never Used   Substance and Sexual Activity   • Alcohol use: No   • Drug use: No   • Sexual activity: Not on file   Other Topics Concern   • Not on file   Social History Narrative   • Not on file     Social Determinants of Health     Financial Resource Strain: Not on file   Food Insecurity: Not on file   Transportation Needs: Not on file   Physical Activity: Not on file   Stress: Not on file   Social Connections: Not on file   Intimate Partner Violence: Not on file   Housing Stability: Not on file       Family History   Problem Relation Age of Onset   • Diabetes Mother    • Heart disease Brother        No Known Allergies    No current facility-administered medications for this visit.    Current Outpatient Medications:   •  albuterol (Proventil HFA) 90 mcg/act inhaler, Inhale 2 puffs every 6 (six) hours as needed for wheezing (Patient not taking: Reported on 1/3/2024), Disp: 6.7 g, Rfl: 5  •  HYDROmorphone HCl ER 8 MG TB24, Take 1 tablet by mouth in the morning Max Daily Amount: 1 tablet, Disp: 30 tablet, Rfl: 0  •  oxyCODONE-acetaminophen (Percocet) 5-325 mg per tablet, Take 1 tablet by mouth every 6 (six) hours as needed for moderate pain Max Daily Amount: 4 tablets, Disp: 60 tablet, Rfl: 0    Facility-Administered Medications Ordered in Other Visits:   •  sodium chloride 0.9 % bolus 500 mL, 500 mL, Intravenous, Once, Celia Young MD, Last Rate: 500 mL/hr at 02/07/24 1956, 500 mL at 02/07/24  1956      There were no vitals taken for this visit.      General Appearance:    Alert, oriented, in wheelchair, chronically ill       Eyes:    PERRL   Ears:    Normal external ear canals, both ears   Nose:   Nares normal, septum midline   Throat:   Mucosa moist. Pharynx without injection.    Neck:   Supple       Lungs:   Breath sounds in the right base of the lung are absent   Chest Wall:    No tenderness or deformity    Heart:    Regular rate and rhythm       Abdomen:     Soft, non-tender, bowel sounds +, no organomegaly           Extremities:   Extremities no cyanosis or edema       Skin:   no rash or icterus.    Lymph nodes:   Cervical, supraclavicular, and axillary nodes normal   Neurologic:   CNII-XII intact, normal strength, sensation and reflexes     Throughout               Recent Results (from the past 48 hour(s))   Fingerstick Glucose (POCT)    Collection Time: 02/07/24  7:52 AM   Result Value Ref Range    POC Glucose 83 65 - 140 mg/dl   ECG 12 lead    Collection Time: 02/07/24  3:44 PM   Result Value Ref Range    Ventricular Rate 103 BPM    Atrial Rate 103 BPM    KS Interval 154 ms    QRSD Interval 68 ms    QT Interval 322 ms    QTC Interval 421 ms    P Axis 81 degrees    QRS Axis 112 degrees    T Wave Axis 78 degrees   Comprehensive metabolic panel    Collection Time: 02/07/24  6:02 PM   Result Value Ref Range    Sodium 129 (L) 135 - 147 mmol/L    Potassium 4.1 3.5 - 5.3 mmol/L    Chloride 90 (L) 96 - 108 mmol/L    CO2 28 21 - 32 mmol/L    ANION GAP 11 mmol/L    BUN 12 5 - 25 mg/dL    Creatinine 0.62 0.60 - 1.30 mg/dL    Glucose 107 65 - 140 mg/dL    Calcium 9.5 8.4 - 10.2 mg/dL    AST 25 13 - 39 U/L    ALT 16 7 - 52 U/L    Alkaline Phosphatase 84 34 - 104 U/L    Total Protein 6.7 6.4 - 8.4 g/dL    Albumin 3.8 3.5 - 5.0 g/dL    Total Bilirubin 0.91 0.20 - 1.00 mg/dL    eGFR 105 ml/min/1.73sq m   CBC and differential    Collection Time: 02/07/24  6:02 PM   Result Value Ref Range    WBC 8.23 4.31 - 10.16  "Thousand/uL    RBC 3.90 3.88 - 5.62 Million/uL    Hemoglobin 12.2 12.0 - 17.0 g/dL    Hematocrit 34.8 (L) 36.5 - 49.3 %    MCV 89 82 - 98 fL    MCH 31.3 26.8 - 34.3 pg    MCHC 35.1 31.4 - 37.4 g/dL    RDW 13.5 11.6 - 15.1 %    MPV 8.9 8.9 - 12.7 fL    Platelets 219 149 - 390 Thousands/uL    nRBC 0 /100 WBCs    Neutrophils Relative 75 43 - 75 %    Immat GRANS % 2 0 - 2 %    Lymphocytes Relative 14 14 - 44 %    Monocytes Relative 9 4 - 12 %    Eosinophils Relative 0 0 - 6 %    Basophils Relative 0 0 - 1 %    Neutrophils Absolute 6.14 1.85 - 7.62 Thousands/µL    Immature Grans Absolute 0.15 0.00 - 0.20 Thousand/uL    Lymphocytes Absolute 1.16 0.60 - 4.47 Thousands/µL    Monocytes Absolute 0.73 0.17 - 1.22 Thousand/µL    Eosinophils Absolute 0.03 0.00 - 0.61 Thousand/µL    Basophils Absolute 0.02 0.00 - 0.10 Thousands/µL   Protime-INR    Collection Time: 02/07/24  6:02 PM   Result Value Ref Range    Protime 14.3 11.6 - 14.5 seconds    INR 1.05 0.84 - 1.19   APTT    Collection Time: 02/07/24  6:02 PM   Result Value Ref Range    PTT 36 23 - 37 seconds   B-Type Natriuretic Peptide(BNP)    Collection Time: 02/07/24  6:02 PM   Result Value Ref Range    BNP 57 0 - 100 pg/mL   HS Troponin 0hr (reflex protocol)    Collection Time: 02/07/24  6:02 PM   Result Value Ref Range    hs TnI 0hr 27 \"Refer to ACS Flowchart\"- see link ng/L         CTA ED chest PE Study    Result Date: 2/7/2024  Narrative: CTA - CHEST WITH IV CONTRAST - PULMONARY ANGIOGRAM INDICATION:   SOB, metastatic cancer. Per my review of the medical record, newly diagnosed metastatic non-small cell lung cancer COMPARISON: PET/CT 2/7/2024, chest CT 1/11/2024. TECHNIQUE: CT angiogram timed for optimal opacification of the pulmonary arteries.  Axial, sagittal, and coronal 2D reformats created from source data.  Coronal 3D MIP postprocessing on the acquisition scanner. Radiation dose length product (DLP):  346 mGy-cm .  Radiation dose exposure minimized using iterative " reconstruction and automated exposure control. IV Contrast:  85 mL of iohexol (OMNIPAQUE) FINDINGS: PULMONARY ARTERIES:  No pulmonary embolus. LUNGS: Moderate emphysema. Worsening groundglass opacity in the right middle lobe. Moderate compressive atelectasis in the right lower lobe. Moderate emphysema. PLEURA: Persistent large right effusion. Trace left effusion. HEART/GREAT VESSELS: Normal heart size. Small pericardial effusion. Mild coronary artery calcification indicating atherosclerotic heart disease. MEDIASTINUM AND CLEMENTINA: Redemonstration of tumor encasing the distal trachea and right bronchi. Tumor in the right anterior mediastinum extending into the lower neck bilaterally. Persistent encasement and narrowing of both brachiocephalic veins and the SVC  with reflux of contrast into mediastinal collaterals in the azygos and hemiazygos veins.. CHEST WALL AND LOWER NECK: Mild bilateral axillary lymphadenopathy. UPPER ABDOMEN: Multiple low-attenuation lesions in the liver are due to cysts and metastases. Right renal cysts, one hyperdense. OSSEOUS STRUCTURES: Redemonstration of destruction of the manubrium and upper body of the sternum. Metastatic disease involving the right first and ninth ribs and the left third rib. Multiple subtle lytic metastases throughout the spine.     Impression: No pulmonary embolus. Persistent large right pleural effusion. Worsening groundglass opacity in the right middle lobe, of uncertain etiology. Redemonstration of tumor encasing the distal trachea and right bronchi extending into the anterior mediastinum and lower neck bilaterally encasing and narrowing of the brachiocephalic veins and SVC and occlusion of the right bronchi. Mild bilateral axillary lymphadenopathy, possibly metastatic. Liver metastases better shown on prior study. Multiple bone metastases. 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 COMPARISON: PET scan from 2/7/2024 TECHNIQUE: Multiplanar, multisequence imaging of the brain was performed before and after gadolinium administration. IV Contrast:  6 mL of Gadobutrol injection (SINGLE-DOSE) IMAGE QUALITY:   Diagnostic. FINDINGS: BRAIN PARENCHYMA:  There is no discrete mass, mass effect or midline shift. There is no intracranial hemorrhage.  Normal posterior fossa.  Diffusion imaging is unremarkable. A few small scattered hyperintensities on T2/FLAIR imaging are noted in the periventricular and subcortical white matter demonstrating an appearance that is statistically most likely to represent minimal to mild microangiopathic change. Probable right parietal developmental venous anomaly. No enhancing lesion is identified. VENTRICLES:  Normal for the patient's age. SELLA AND PITUITARY GLAND:  Normal. ORBITS:  Normal. PARANASAL SINUSES:  Normal. VASCULATURE:  Evaluation of the major intracranial vasculature demonstrates appropriate flow voids. CALVARIUM AND SKULL BASE: 5 mm T2 hyperintense, mildly T1 hypointense lesion within the right parietal calvarium (5:20). T1 hypointense, T2 hyperintense lesion involving the right C1 lateral mass. T1 hypointense signal within the left side of the C2 vertebral body and throughout the C3 vertebral bodies. T1 hypointense signal extends into the left posterior elements at C2 and C3. previously questioned left mandibular angle lesion is suboptimally evaluated due to field-of-view. EXTRACRANIAL SOFT TISSUES:  Normal.     Impression: -No intracranial metastatic disease. No acute intracranial abnormality. -Indeterminant 5 mm T2 hyperintense mildly T1 hypointense lesion within the right parietal calvarium. Differential includes metastatic disease although no FDG uptake was noted in this region on subsequent PET scan and this  lesion is not as T1 hypointense  as below mentioned cervical lesions. Comparison with prior imaging would be helpful. 3-month follow-up exam can be considered to document stability. -Suboptimally evaluated T1 hypointense lesions within the right C1 lateral mass, C2 and C3 vertebral bodies including the left-sided posterior elements. There was associated FDG uptake on subsequent PET scan, suspicious for metastatic disease. Dedicated MRI of the cervical spine with and without contrast can be obtained for complete evaluation. The study was marked in EPIC for significant notification. Workstation performed: [id number]     NM PET CT skull base to mid thigh    Result Date: 2/7/2024  Narrative: PET/CT SCAN INDICATION: Newly diagnosed non-small cell lung cancer. 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 Multiplanar attenuation corrected and non attenuation corrected PET images are available for interpretation, and contiguous, low dose, axial CT sections were obtained from the skull vertex through the femurs. Intravenous contrast material was not utilized. This examination, like all CT scans performed in the [facility], was performed utilizing techniques to minimize radiation dose exposure, including the use of iterative reconstruction and automated exposure control. Fasting serum glucose: 83 mg/dl FINDINGS: VISUALIZED BRAIN: No acute abnormalities are seen. HEAD/NECK: FDG avid peggy disease in the lower neck bilaterally. In the left lower neck, conglomerate adenopathy demonstrates SUV max of 5. This measures on the order of 3 cm in AP dimension image 95 series 3. CT images: No additional significant findings. CHEST: Extensive FDG avid superior  mediastinal disease anteriorly extending to the anterior chest wall, SUV max 11 at the level of the manubrium. Mass is not well-defined on CT. Ill-defined soft tissue thickening in the right perihilar region demonstrates SUV max of 4.9. Small focus of FDG uptake in the left hilar region, SUV max 3.3. Mild FDG uptake in shotty appearing axillary lymph nodes bilaterally, nonspecific. Previously noted tiny pulmonary nodules not well seen on low-dose CT. These are likely too small to assess by PET. CT images: Moderate to large right pleural effusion, increased from prior CT. Stable small to moderate pericardial effusion. Scattered emphysematous changes of the lung fields. ABDOMEN: Scattered FDG-avid hepatic lesions. Lesion at the left lateral margin of the liver demonstrates SUV max of 9. On prior CT in retrospect there is likely a 1.9 cm ovoid hypodense lesion. This is difficult to visualize on MRI due to motion at this level. The lesion in the left lobe of the liver just lateral to the falciform ligament demonstrates SUV max of 5.9. No obvious findings limited CT. This measured 1.1 cm on prior MRI. Two lesions identified at the dome of the liver, SUV max of 5.5 posteriorly. No obvious findings on the limited CT. These measure on the order of 1 cm on the prior MRI. FDG uptake in scattered small retroperitoneal lymph nodes. A retrocaval lymph node demonstrates a SUV max of 3.2. See image 167 series 1200. This measures 4 mm short axis image 193 series 3. Limited assessment for renal lesions due to normal excretion of FDG by the genitourinary system. CT images: No additional significant findings. PELVIS: There are a few foci of FDG uptake along the left anterior thigh musculature. A focus here demonstrates SUV max of 3.4. See for example, image 241 series 1200. Intramuscular metastasis is not excluded. Minimal activity in the left hemipelvis likely physiologic ureteral activity. No suspicious FDG avid soft tissue lesions.  CT images: No additional significant finding. OSSEOUS STRUCTURES: Innumerable FDG-avid osseous lesions compatible with widespread osseous metastasis. Some of these are lytic on CT. Reference lesions as noted below: Lesion at the right lateral mass of C1 demonstrates SUV max of 8.2. Lesion at the left mandible posteriorly demonstrates SUV max of 6.5. Left glenoid lesion demonstrates SUV max of 9.4. Manubrial lesion demonstrates SUV max of 11. Left scapular body lesion demonstrates SUV max 7.9. T4 vertebral body lesion demonstrates SUV max of 11. L4 vertebral body lesion demonstrates SUV max of 13. Left sacral lesion demonstrates SUV max of 12. The right anterior iliac lesion demonstrates SUV max of 11. Left posterior iliac lesion demonstrates SUV max of 11. Right posterior iliac lesion demonstrates SUV max of 12. Left posterior acetabular lesion demonstrates SUV max of 10. Right proximal femur lesion at the level at the intertrochanteric region demonstrates SUV max of 7.9.     Impression: 1. FDG avid intrathoracic disease most extensive at the superior mediastinum and right perihilar region concerning for malignancy. 2. Extensive FDG avid peggy disease in the lower neck, chest and abdomen suspicious for metastasis. 3. Scattered FDG-avid hepatic lesions concerning for metastasis. 4. Innumerable FDG avid osseous lesions compatible with widespread osseous metastasis. 5. A few small foci of FDG uptake along the left anterior thigh musculature for which intramuscular metastasis is not excluded. Workstation performed: [id number]     MRI abdomen w wo contrast    Result Date: 1/31/2024  Narrative: MRI - ABDOMEN - WITH AND WITHOUT CONTRAST INDICATION: 63 years / Male. K76.9: Liver disease, unspecified C80.1: Malignant (primary) neoplasm, unspecified M89.8X9: Other specified disorders of bone, unspecified site. Liver lesion and increased pain. Based on epic prior imaging reports and pathology, patient had a sternal lytic  lesion biopsy demonstrating metastatic non-small cell carcinoma. COMPARISON: Chest CT from 1/11/2024. TECHNIQUE: Multiplanar/multisequence MRI of the abdomen was performed before and after administration of contrast. IV Contrast: 6 mL of Gadobutrol injection (SINGLE-DOSE) FINDINGS: LOWER CHEST: Moderate right pleural effusion and trace left pleural effusion. Small pericardial effusion. LIVER: Normal in size and configuration. In segment 4B of the liver, there is a 1.1 cm mildly T2 hyperintense, and hypoenhancing liver lesion suspicious for metastasis (series 7 image 19, series 11 images 60-63, images 178-183.) There is altered perfusion of the liver distal to this lesion. Similarly, there are 2 additional adjacent lesions in the right hepatic dome, each 1 cm, also suspicious for metastatic disease (series 7 image 9 and series 11 image 30, 150.) There are are numerous small simple cysts scattered throughout the rest of the liver. Patent hepatic and portal veins. BILE DUCTS: No intrahepatic or extrahepatic bile duct dilation. GALLBLADDER: Normal PANCREAS: Unremarkable. ADRENAL GLANDS: Unremarkable. SPLEEN: Normal. KIDNEYS/PROXIMAL URETERS: No hydroureteronephrosis. No suspicious renal mass. Several small simple renal cysts. BOWEL: No dilated loops of bowel. PERITONEUM/RETROPERITONEUM: No ascites. LYMPH NODES: No abdominal lymphadenopathy. VESSELS: No aneurysm. ABDOMINAL WALL: Unremarkable BONES: No suspicious osseous lesion.     Impression: In segment 4B of the liver, there is a 1.1 cm mildly T2 hyperintense, and hypoenhancing liver lesion suspicious for metastasis (series 7 image 19, series 11 images 60-63, images 178-183.) Similarly, there are 2 additional adjacent lesions in the right hepatic dome, each 1 cm, also suspicious for metastatic disease (series 7 image 9 and series 11 image 30, 150.) Workstation performed: [id number]     XR follow up    Result Date: 1/27/2024  Narrative: ORBITS INDICATION:   K76.9:  Liver disease, unspecified C80.1: Malignant (primary) neoplasm, unspecified M89.8X9: Other specified disorders of bone, unspecified site. COMPARISON:  None VIEWS:  XR FOLLOW UP (NO CHARGE) FINDINGS: There is no evidence of radiopaque orbital foreign body. The paranasal sinuses are clear.     Impression: No radiopaque orbital foreign body. Workstation performed: [id number]     IR biopsy bone    Result Date: 1/26/2024  Narrative: CT-scan guided needle biopsy of the manubrium History: Patient with erosive lesion in the manubrium. Conscious sedation time: 45 minutes Technique: This examination, like all CT scans performed in the [facility], was performed utilizing techniques to minimize radiation dose exposure, including the use of iterative reconstruction and automated exposure control. The patient was brought to the CT scanner and placed supine on the table. After axial images were obtained through the region of interest an area of the skin was then marked, prepped, and draped in usual sterile fashion. Lidocaine was administered to the  skin and a small skin incision was made. A A 14-gauge cannula was advanced into the manubrial lesion and multiple 15-gauge cores were obtained. Specimen was submitted in both formalin and RPMI. The needle was removed and hemostasis was achieved. The patient tolerated the procedure well and suffered no complications.     Impression: Impression: Successful percutaneous core biopsy of a manubrial lesion. Workstation performed: [id number]     CT chest w contrast    Result Date: 1/11/2024  Narrative: CT CHEST WITH IV CONTRAST INDICATION:   R91.8: Other nonspecific abnormal finding of lung field. COMPARISON: Chest radiograph 1/3/2024. No prior CTs available for comparison. TECHNIQUE: CT examination of the chest was performed. Multiplanar 2D reformatted images were created from the source data. This examination, like all CT scans performed in the [facility],  was performed utilizing techniques to minimize radiation dose exposure, including the use of iterative reconstruction and automated exposure control. Radiation dose length product (DLP) for this visit:  295 mGy-cm IV Contrast:  85 mL of iohexol (OMNIPAQUE) FINDINGS: LUNGS: Mild to moderate emphysematous changes. There is no tracheal or endobronchial lesion. Segmental right posterior upper lobe atelectasis. Curvilinear opacity in the subpleural right lung apex adjacent to the mass described below, likely focal atelectasis. 2 mm nodule in the right upper lobe anteriorly (series 4, image 39). 3 mm right upper lobe groundglass nodule measuring 3 mm (series 4 image 21). Polygonal groundglass opacity in the anterior left upper lobe (for example, series 4 image 66), compatible with subsegmental atelectasis and mild inflammatory changes. Bilateral calcified granulomata. PLEURA: Small right pleural effusion with adjacent atelectasis. Trace left pleural effusion. Focal pleural thickening/nodularity measuring up to 7 mm adjacent to the anterior mediastinal mass, suspicious ford represent pleural involvement. HEART/GREAT VESSELS: Normal heart size. Small to moderate pericardial effusion. Mild coronary artery calcification. No thoracic aortic aneurysm. MEDIASTINUM AND CLEMENTINA: Masslike fullness of the anterior mediastinum measuring approximately 3.6 x 3.9 cm (please note that this measurement also includes a portion of the involved manubrium) adjacent to a destructive lytic lesion involving the manubrium and sternum. The mass circumscribes the great vessels including the proximal portion of the right brachiocephalic vein, and left brachiocephalic vein, SVC, and proximal aortic arch. The mass also extends inferiorly into the right paratracheal mediastinum, subcarinal mediastinum, and right hilum. There is an additional 1.1 x 1.0 cm right hilar lymph node (series 2, image 68) CHEST WALL AND LOWER NECK: Mass in the anterior chest wall  with destruction of the mid to right manubrium and sternum, with extension into the adjacent soft tissues, as well as the anterior mediastinum. There is narrowing of the left brachiocephalic vein, right brachiocephalic vein, and SVC, which are circumscribed by this mass. There appear to be 2 adjacent mildly enlarged right supraclavicular nodes measuring up to 1.1 cm in short axis (series 2, image 8), suspicious for peggy involvement. VISUALIZED STRUCTURES IN THE UPPER ABDOMEN: There are a few scattered hepatic cysts. However there are additional subcentimeter low-attenuation lesions in the liver, too small to characterize. 1.5 cm intermediate attenuation lesion arising from the posterior right kidney, which is incompletely characterized on this single phase postcontrast examination. OSSEOUS STRUCTURES: There is a lytic destructive lesion in the mid-right manubrium and upper sternum.     Impression: 1. There is a destructive mass centered in the right upper-anterior mediastinum, with resultant destruction of the manubrium and sternum, and adjacent right medial pleural thickening that is likely involved. The mass also circumscribes and narrows the right brachiocephalic vein, SVC and left brachiocephalic vein.  The mass extends inferiorly and posteriorly to involve the right paratracheal and subcarinal mediastinum, as well as the right hilum. Findings are highly suspicious for malignant involvement, of which differential considerations include primary lung cancer, sarcoma, or lymphoma. Further management considerations may include subspecialty consultation, tissue sampling, and/or FDG PET/CT. 2. Right supraclavicular lymphadenopathy, suspicious for peggy involvement. In addition to the mediastinal/right hilar direct infiltration by the mass as described above, there is another discrete/separate 1.1 cm right hilar lymph node that is also likely involved. 3. 2 mm right upper lobe nodule, and a 3 mm right upper lobe  groundglass nodule. Recommend attention to on subsequent follow-up. 3. Small right and trace left pleural effusions, similar to the chest radiograph of 1/3/2024. 4. There are a few subcentimeter low-attenuation hepatic lesions, which are too small to characterize. However, metastatic disease is not excluded in light of the above. Consider further evaluation with abdominal MRI. 5. 1.5 cm intermediate attenuation lesion arising from the posterior right kidney, which is incompletely characterized on this single phase postcontrast examination. This can be evaluated on MRI as recommended above. I personally discussed this study with ZHANG GARZA on 1/11/2024 12:30 PM. Resident: MARISOL GRANADOS I, the attending radiologist, have reviewed the images and agree with the final report above. Workstation performed: [id number]     ECOG :2      Assessment and plan:    63-year-old  male heavy smoker used to smoke 1 pack of cigarette daily for many years presented with shortness of breath, pain in the upper sternum, was found to have large lesion involving and destructive of the manubrium, with extension into the right parahilar, subcarinal mediastinum, multiple bilateral pulmonary nodules, multiple bony metastasis, scattered hepatic metastasis, biopsy showed poorly differentiated adenocarcinoma consistent most likely with lung primary positive for CK5/6, CK7, negative for CK20, TTF-1, calretinin, Napsin a, CA 19.9    No evidence of parenchymal brain metastasis by MRI    The patient has ECOG score of 2 he has pain in the lower back with numbness towards his lower extremities    The patient is symptomatic with dyspnea most likely secondary to more spread cancer involvement as well as right pleural effusion    Sent the patient to the ER for further evaluation and admission to the hospital, consult radiation oncology for lower back pain and possible MRI of the thoracic and lumbar spine to rule out cord compression    Will send  for molecular test by NGS    The best systematic treatment is Alimta/carboplatin/pembrolizumab as soon as possible after evaluation by radiation oncology and stabilization as inpatient    Most likely will start carboplatin Alimta as inpatient and continue with triplet therapy as an outpatient    Zometa for bone metastasis    This is stage IV incurable disease    The patient and his family are aware of the condition and after 2 cycles will do CAT scan of the chest to assess response

## 2024-02-08 NOTE — PLAN OF CARE

## 2024-02-08 NOTE — UTILIZATION REVIEW
Initial Clinical Review    Admission: Date/Time/Statement:   Admission Orders (From admission, onward)       Ordered        02/07/24 2013  INPATIENT ADMISSION  Once                          Orders Placed This Encounter   Procedures    INPATIENT ADMISSION     Standing Status:   Standing     Number of Occurrences:   1     Order Specific Question:   Level of Care     Answer:   Med Surg [16]     Order Specific Question:   Estimated length of stay     Answer:   More than 2 Midnights     Order Specific Question:   Certification     Answer:   I certify that inpatient services are medically necessary for this patient for a duration of greater than two midnights. See H&P and MD Progress Notes for additional information about the patient's course of treatment.     ED Arrival Information       Expected   2/7/2024     Arrival   2/7/2024 15:07    Acuity   Urgent              Means of arrival   Walk-In    Escorted by   Family Member    Service   Hospitalist    Admission type   Emergency              Arrival complaint   Metastatic non-small cell lung cancer (HCC)             Chief Complaint   Patient presents with    Shortness of Breath    Pain     Pt's daughter reports chronic but worsening SOB, pain located in chest, neck, L leg, buttocks, hip. Referred by Dr Maynard in Palliative Care. Hx lung cancer.       Initial Presentation: 63 y.o. male   with hx recently dx metastatic non-small cell lung cancer , daily cigarette smoker who presents to ED after referral from outpatient palliative care provider due to complaints of worsening shortness of breath and worsening pain . Pt endorses back pain with radiation to left lower extremity. Patient reports shortness of breath is worse with movement. He endorses wheezing. On exam, decreased breath sounds with wheezing, RA sat 87 % . Currently on O2 @ 5 L  with sat in 90's . Hoarse voice . Labs Na 129  .CTA chest neg for PE , shows persistent large right pleural effusion, worsening GGO R mid  lobe, redemonstration of tumor . Pt given IVF, IV analgesics in ED. Admitted as Inpatient with acute resp failure w/ hypoxia, metastatic lung CA w/ associated pain . Hyponatremia. R Pleural effusion . PLan - supplem,ental o2 - wean to keep sat >88 % . IV Solumedrol 40 mg q8h. Pulmonology consult - will lesley need thoracentesis . Oncology consult, rad Onc consult, palliative care consult. Pain control. IVF- Monitor BMP . Hyponatremia workup       Date: 2/8    Day 2:    Pulmonology consult-  Pleural effusion, acute hypoxemic respiratory failure, lung adenocarcinoma .   Bedside thoracentesis- R sided 2/8 @ 0931: 1 L clear yellow fluid removed , sending labs. If malignant he may benefit from a indwelling pleural catheter   Postprocedure CXR still has volume in there.  Bedside ultrasound postprocedure also showed he still had volume but procedure was discontinued as pt started complaining of some mild chest tightness with cough. CT chest does show tumor encasement of the trachea and bronchi, has not started treatment yet.  May also need to consider pneumonia in the differential as he has groundglass in the right middle and has a cough but unable to expectorate significantly . Discussed starting Duonebs but pt wishes to hold off for now . Obtain sputum cx . Start Flutter valve , and spirometry . Wean supplemental o2 to keep sat >88 % .    Palliative care consult- Pt dyspneic with conversation . states his breathing has improved since his thoracentesis this morning which yielded 1 liter of fluid. He states his pain, particularly his back and left thigh pain has improved with Dilaudid   .   Full disease directed cares . Patient open to chemotherapy and radiation therapies as offered for cancer treatment and metastatic disease . Adjusted pain med regime to Scheduled PO Dilaudid 2 mg q8 hours ATC. PRN po Dilaudid and prn IV Dilaudid . Started bowel regime, pt c/o constipation, day 4 w/o bm, is passing flatus .     Medicine-  Currently SpO2 95% on 6 L NC. Wean supplemental O2 to maintain SpO2 > 88% . Still with shortness of breath however slightly improved after thoracentesis .  Decreased breath sounds r side  . Pt appears euvolemic,. F/U fluid analysis and cytology results s/p thoracentesis today . Obtain MRI of the thoracic and lumbar spine to rule out cord compression because of low back pain and osseous mets  on PET scan.   Med Onc consult- Poorly differentiated adenocarcinoma of the lung with lesions involving the manubrium, extension into the right perihilar, subcarinal mediastinum, bilateral pulmonary nodules, bony metastasis, hepatic metastasis . Rule out cord compression with tingling of the lower extremities, will order MRI of the thoracic and lumbar spine . Needs treatment with Alimta/carboplatin as soon as possible and then pembrolizumab as an outpatient along with chemotherapy . Radiation oncology consult   Rad Onc consult- has previously agreed to radiation therapy to the intrathoracic disease, and CT simulation will be arranged as an inpatient on 2/9/24. Treatment expected to start shortly thereafter pending complexity of plan. Radiation therapy can be initiated/continued as an inpatient our outpatient if medically stable for discharge. Agree to obtain MRI imaging of the cervical, thoracic, and lumbar spine. Obtain neurosurgical consultation.     ED Triage Vitals [02/07/24 1545]   Temperature Pulse Respirations Blood Pressure SpO2   97.8 °F (36.6 °C) 102 22 133/78 92 %      Temp Source Heart Rate Source Patient Position - Orthostatic VS BP Location FiO2 (%)   Oral Monitor Sitting Right arm --      Pain Score       10 - Worst Possible Pain          Wt Readings from Last 1 Encounters:   02/08/24 61.1 kg (134 lb 11.2 oz)     Additional Vital Signs:   ate/Time Temp Pulse Resp BP MAP (mmHg) SpO2 Calculated FIO2 (%) - Nasal Cannula Nasal Cannula O2 Flow Rate (L/min) O2 Device   02/08/24 07:28:19 97.5 °F (36.4 °C) 95 16 140/84  103 92 % 44 6 L/min Nasal cannula   02/08/24 0600 -- 97 -- -- -- 93 % 44 6 L/min Nasal cannula   02/08/24 04:21:30 -- 98 22 -- -- 93 % 44 6 L/min Nasal cannula   02/07/24 23:47:31 97.7 °F (36.5 °C) 91 -- 150/82 105 93 % -- -- --   02/07/24 23:46:57 -- 94 -- 150/82 105 94 % -- -- --   02/07/24 2322 -- 94 -- -- -- 95 % 40 5 L/min Nasal cannula   02/07/24 21:49:47 -- 93 -- 113/72 86 94 % -- -- --   02/07/24 20:58:25 97.5 °F (36.4 °C) 111 Abnormal  -- 150/101 Abnormal  117 95 % -- -- --   02/07/24 2000 -- 93 20 146/76 103 95 % 28 2 L/min Nasal cannula   02/07/24 1900 -- 91 20 159/85 114 97 % 28 2 L/min Nasal cannula   02/07/24 1745 -- 94 22 136/81 103 98 % 28 2 L/min Nasal cannula   02/07/24 1734 -- -- -- -- -- 87 % Abnormal  -- -- None (Room air)     Pertinent Labs/Diagnostic Test Results:    2/7 ECG-   Rate:    ECG rate:  103    ECG rate assessment: tachycardic    Rhythm:    Rhythm: sinus tachycardia    Ectopy:    Ectopy: PAC    QRS:    QRS axis:  Normal    QRS intervals:  Normal  Conduction:    Conduction: abnormal      Abnormal conduction: LPFB    ST segments:    ST segments:  Normal  T waves:    T waves: normal                XR chest portable   Final Result by Doreen Xavier MD (02/08 1200)      Persistent moderate right pleural effusion. No pneumothorax.      Persistent airspace opacity at the right lung base.            Workstation performed: [id number]         CTA ED chest PE Study   Final Result by Rose Grijalva MD (02/07 1941)      No pulmonary embolus.      Persistent large right pleural effusion.      Worsening groundglass opacity in the right middle lobe, of uncertain etiology.      Redemonstration of tumor encasing the distal trachea and right bronchi extending into the anterior mediastinum and lower neck bilaterally encasing and narrowing of the brachiocephalic veins and SVC and occlusion of the right bronchi.      Mild bilateral axillary lymphadenopathy, possibly metastatic.      Liver  metastases better shown on prior study.      Multiple bone metastases.            Workstation performed: [id number]         2/7 NM PET scan as outpt skull base to mid thigh  1. FDG avid intrathoracic disease most extensive at the superior mediastinum and right perihilar region concerning for malignancy.  2. Extensive FDG avid peggy disease in the lower neck, chest and abdomen suspicious for metastasis.  3. Scattered FDG-avid hepatic lesions concerning for metastasis.  4. Innumerable FDG avid osseous lesions compatible with widespread osseous metastasis.  5. A few small foci of FDG uptake along the left anterior thigh musculature for which intramuscular metastasis is not excluded          Results from last 7 days   Lab Units 02/08/24  0532 02/07/24  1802   WBC Thousand/uL  --  8.23   HEMOGLOBIN g/dL  --  12.2   HEMATOCRIT %  --  34.8*   PLATELETS Thousands/uL 240 219   NEUTROS ABS Thousands/µL  --  6.14         Results from last 7 days   Lab Units 02/07/24  1802   SODIUM mmol/L 129*   POTASSIUM mmol/L 4.1   CHLORIDE mmol/L 90*   CO2 mmol/L 28   ANION GAP mmol/L 11   BUN mg/dL 12   CREATININE mg/dL 0.62   EGFR ml/min/1.73sq m 105   CALCIUM mg/dL 9.5     Results from last 7 days   Lab Units 02/07/24  1802   AST U/L 25   ALT U/L 16   ALK PHOS U/L 84   TOTAL PROTEIN g/dL 6.7   ALBUMIN g/dL 3.8   TOTAL BILIRUBIN mg/dL 0.91     Results from last 7 days   Lab Units 02/07/24  0752   POC GLUCOSE mg/dl 83     Results from last 7 days   Lab Units 02/07/24  1802   GLUCOSE RANDOM mg/dL 107     Results from last 7 days   Lab Units 02/07/24 2000   OSMOLALITY, SERUM mmol/*               Results from last 7 days   Lab Units 02/07/24 2000 02/07/24  1802   HS TNI 0HR ng/L  --  27   HS TNI 2HR ng/L 28  --    HSTNI D2 ng/L 1  --          Results from last 7 days   Lab Units 02/07/24  1802   PROTIME seconds 14.3   INR  1.05   PTT seconds 36     Results from last 7 days   Lab Units 02/07/24  1802   TSH 3RD GENERATON uIU/mL 1.760                      Results from last 7 days   Lab Units 02/07/24  1802   BNP pg/mL 57                   Results from last 7 days   Lab Units 02/07/24 2000   OSMOLALITY, SERUM mmol/*                 ED Treatment:   Medication Administration from 02/07/2024 1434 to 02/07/2024 2056         Date/Time Order Dose Route Action     02/07/2024 1802 EST HYDROmorphone (DILAUDID) injection 1 mg 1 mg Intravenous Given     02/07/2024 1844 EST iohexol (OMNIPAQUE) 350 MG/ML injection (MULTI-DOSE) 85 mL 85 mL Intravenous Given     02/07/2024 1955 EST HYDROmorphone (DILAUDID) injection 1 mg 1 mg Intravenous Given     02/07/2024 1956 EST sodium chloride 0.9 % bolus 500 mL 500 mL Intravenous New Bag          Past Medical History:   Diagnosis Date    Metastatic non-small cell lung cancer (HCC)      Present on Admission:   Cancer related pain   Metastatic non-small cell lung cancer (HCC)   Pleural effusion on right   Current every day smoker      Admitting Diagnosis: Pleural effusion [J90]  Hypoxia [R09.02]  Metastatic disease (HCC) [C79.9]  Intractable pain [R52]  Age/Sex: 63 y.o. male  Admission Orders:  Scheduled Medications:  enoxaparin, 40 mg, Subcutaneous, Daily  HYDROmorphone, 2 mg, Oral, Q8H SHANTAL  methylPREDNISolone sodium succinate, 40 mg, Intravenous, Q12H SHANTAL  nicotine, 1 patch, Transdermal, Daily  polyethylene glycol, 17 g, Oral, Daily  senna-docusate sodium, 1 tablet, Oral, BID    HYDROmorphone (DILAUDID) tablet 2 mg  Dose: 2 mg  Freq: Every 6 hours Route: PO  Start: 02/08/24 0600 End: 02/08/24 1049   methylPREDNISolone sodium succinate (Solu-MEDROL) injection 40 mg  Dose: 40 mg  Freq: Every 8 hours scheduled Route: IV  Start: 02/07/24 2315 End: 02/08/24 1151         Continuous IV Infusions:     PRN Meds:  acetaminophen, 650 mg, Oral, Q6H PRN  HYDROmorphone, 0.2 mg, Intravenous, Q4H PRN x1 2/7  , x1 2/8   oxyCODONE, 5 mg, Oral, Q6H PRN x1 2/8   End: 02/08/24 1049   oxyCODONE, 2.5 mg, Oral, Q6H PRN   End: 02/08/24 1040    bisacodyl (DULCOLAX) rectal suppository 10 mg  Dose: 10 mg  Freq: Daily PRN Route: RE  PRN Reason: constipation  PRN Comment: 2nd line  Start: 02/08/24 1116   HYDROmorphone (DILAUDID) tablet 2 mg  Dose: 2 mg  Freq: Every 4 hours PRN Route: PO  PRN Reason: moderate pain  Start: 02/08/24 1047   Admin Instructions:         Or  HYDROmorphone (DILAUDID) tablet 4 mg  Dose: 4 mg  Freq: Every 4 hours PRN Route: PO  PRN Reason: severe pain  Start: 02/08/24 1047    x1 2/8       naloxone (NARCAN) 0.04 mg/mL syringe 0.04 mg  Dose: 0.04 mg  Freq: Every 1 Minute as needed Route: IV  PRN Reasons: respiratory depression,opioid reversal  Start: 02/08/24 1049             OOB as to,l   SCD    Reg diet     IP CONSULT TO PULMONOLOGY  IP CONSULT TO RADIATION ONCOLOGY  IP CONSULT TO ONCOLOGY  IP CONSULT TO PALLIATIVE CARE    Network Utilization Review Department  ATTENTION: Please call with any questions or concerns to [phone] and carefully listen to the prompts so that you are directed to the right person. All voicemails are confidential.   For Discharge needs, contact Care Management DC Support Team at [phone] opt. 2  Send all requests for admission clinical reviews, approved or denied determinations and any other requests to dedicated fax number below belonging to the campus where the patient is receiving treatment. List of dedicated fax numbers for the Facilities:  FACILITY NAME UR FAX NUMBER   ADMISSION DENIALS (Administrative/Medical Necessity) [phone]   DISCHARGE SUPPORT TEAM (NETWORK) [phone]   PARENT CHILD HEALTH (Maternity/NICU/Pediatrics) [phone]   [facility] [phone]   [facility] [phone]   [facility] [phone]   [facility] [phone]   [facility] [phone]   [facility] [phone]   [facility]  [phone]   [facility] [phone]   [facility] [phone]   [facility] [phone]   [facility] [phone]   [facility] [phone]

## 2024-02-08 NOTE — CONSULTS
Oncology Consult Note  Abdulaziz Gomez 63 y.o. male MRN: [id number]  Unit/Bed#: S -01 Encounter: [id number]      Presenting Complaint: Metastatic adenocarcinoma of the lung    History of Presenting Illness: Abdulaziz Gomez is seen for initial consultation 2/7/2024 at the referral of Jb Maynard DO   63-year-old male with history of COPD, chronic cough, cervical pain second CT scan of the chest on 1/11/2024 showed destructive mass in the right upper anterior mediastinum with destruction of the manubrium, sternum, right medial pleural thickening, narrowing of the right brachiocephalic vein, SVC, left brachiocephalic vein with extension into subcarinal mediastinum, right hilum, right supraclavicular lymphadenopathy, right pleural effusion, PET scan on 2/7/2024 showed intrathoracic disease extensive in the superior mediastinum, right perihilar area, scattered hepatic lesions concerning for metastasis, multiple and innumerable osseous lesions with widespread metastasis, FDG uptake in the left anterior thigh musculature     Biopsy of the sternum showed poorly differentiated adenocarcinoma of the lung positive for CK7, CK19, PAX8, CK5/6, GATA3, negative for CK20, TTF-1, Napsin A, p40, calretinin, WT1, uroplakin and CA 19.9     MRI of the brain showed no intracranial metastatic disease, 5 mm right parietal calvarium lesion most likely consistent with metastatic disease    Admitted yesterday because of increasing dyspnea and back pain, was found to have large right-sided pleural effusion status postthoracentesis on 2/8/2024 1.1 L    He feels much better  Review of Systems - As stated in the HPI otherwise the fourteen point review of systems was negative.    Past Medical History:   Diagnosis Date    Metastatic non-small cell lung cancer (HCC)        Social History     Socioeconomic History    Marital status: /Civil Union     Spouse name: None    Number of children: None    Years of education: None    Highest  education level: None   Occupational History    None   Tobacco Use    Smoking status: Every Day     Current packs/day: 1.00     Average packs/day: 1 pack/day for 50.1 years (50.1 ttl pk-yrs)     Types: Cigarettes     Start date: 1974     Passive exposure: Past    Smokeless tobacco: Never   Vaping Use    Vaping status: Never Used   Substance and Sexual Activity    Alcohol use: No    Drug use: No    Sexual activity: None   Other Topics Concern    None   Social History Narrative    None     Social Determinants of Health     Financial Resource Strain: Not on file   Food Insecurity: Not on file   Transportation Needs: Not on file   Physical Activity: Not on file   Stress: Not on file   Social Connections: Not on file   Intimate Partner Violence: Not on file   Housing Stability: Not on file       Family History   Problem Relation Age of Onset    Diabetes Mother     Heart disease Brother        No Known Allergies      Current Facility-Administered Medications:     acetaminophen (TYLENOL) tablet 650 mg, 650 mg, Oral, Q6H PRN, MARY Sampson    bisacodyl (DULCOLAX) rectal suppository 10 mg, 10 mg, Rectal, Daily PRN, Violeta Ruiz MD    enoxaparin (LOVENOX) subcutaneous injection 40 mg, 40 mg, Subcutaneous, Daily, MARY Sampson, 40 mg at 02/08/24 0817    HYDROmorphone (DILAUDID) tablet 2 mg, 2 mg, Oral, Q8H Replaced by [facility], Jb Maynard DO, 2 mg at 02/08/24 1426    HYDROmorphone (DILAUDID) tablet 2 mg, 2 mg, Oral, Q4H PRN **OR** HYDROmorphone (DILAUDID) tablet 4 mg, 4 mg, Oral, Q4H PRN, Jb Olverau, DO, 4 mg at 02/08/24 1132    HYDROmorphone HCl (DILAUDID) injection 0.2 mg, 0.2 mg, Intravenous, Q4H PRN, MARY Sampson, 0.2 mg at 02/08/24 1253    methylPREDNISolone sodium succinate (Solu-MEDROL) injection 40 mg, 40 mg, Intravenous, Q12H Replaced by [facility], Violeta Ruiz MD    naloxone (NARCAN) 0.04 mg/mL syringe 0.04 mg, 0.04 mg, Intravenous, Q1MIN PRN, Jb Maynard DO    nicotine (NICODERM CQ) 7 mg/24hr TD 24 hr patch 1 patch,  1 patch, Transdermal, Daily, MARY Sampson, 1 patch at 02/08/24 0817    polyethylene glycol (MIRALAX) packet 17 g, 17 g, Oral, Daily PRN, Jb Maynard DO    polyethylene glycol (MIRALAX) packet 17 g, 17 g, Oral, Daily, Violeta Ruiz MD, 17 g at 02/08/24 1132    senna-docusate sodium (SENOKOT S) 8.6-50 mg per tablet 1 tablet, 1 tablet, Oral, BID, Violeta Ruiz MD, 1 tablet at 02/08/24 1132      /88   Pulse 103   Temp 97.7 °F (36.5 °C)   Resp 20   Wt 61.1 kg (134 lb 11.2 oz)   SpO2 92%   BMI 19.89 kg/m²       General Appearance:    Alert, oriented        Eyes:    PERRL   Ears:     Nose:    Throat:    Neck:        Lungs:   Decreased breath sounds on the right side   Chest Wall:      Heart:         Abdomen:              Extremities:   Extremities no cyanosis or edema       Skin:   no rash or icterus.    Lymph nodes:   Cervical, supraclavicular, and axillary nodes normal   Neurologic:   CNII-XII intact, normal strength, sensation and reflexes     Throughout               Recent Results (from the past 48 hour(s))   Fingerstick Glucose (POCT)    Collection Time: 02/07/24  7:52 AM   Result Value Ref Range    POC Glucose 83 65 - 140 mg/dl   ECG 12 lead    Collection Time: 02/07/24  3:44 PM   Result Value Ref Range    Ventricular Rate 103 BPM    Atrial Rate 103 BPM    MO Interval 154 ms    QRSD Interval 68 ms    QT Interval 322 ms    QTC Interval 421 ms    P Axis 81 degrees    QRS Axis 112 degrees    T Wave Axis 78 degrees   Comprehensive metabolic panel    Collection Time: 02/07/24  6:02 PM   Result Value Ref Range    Sodium 129 (L) 135 - 147 mmol/L    Potassium 4.1 3.5 - 5.3 mmol/L    Chloride 90 (L) 96 - 108 mmol/L    CO2 28 21 - 32 mmol/L    ANION GAP 11 mmol/L    BUN 12 5 - 25 mg/dL    Creatinine 0.62 0.60 - 1.30 mg/dL    Glucose 107 65 - 140 mg/dL    Calcium 9.5 8.4 - 10.2 mg/dL    AST 25 13 - 39 U/L    ALT 16 7 - 52 U/L    Alkaline Phosphatase 84 34 - 104 U/L    Total Protein 6.7 6.4 - 8.4 g/dL  "   Albumin 3.8 3.5 - 5.0 g/dL    Total Bilirubin 0.91 0.20 - 1.00 mg/dL    eGFR 105 ml/min/1.73sq m   CBC and differential    Collection Time: 02/07/24  6:02 PM   Result Value Ref Range    WBC 8.23 4.31 - 10.16 Thousand/uL    RBC 3.90 3.88 - 5.62 Million/uL    Hemoglobin 12.2 12.0 - 17.0 g/dL    Hematocrit 34.8 (L) 36.5 - 49.3 %    MCV 89 82 - 98 fL    MCH 31.3 26.8 - 34.3 pg    MCHC 35.1 31.4 - 37.4 g/dL    RDW 13.5 11.6 - 15.1 %    MPV 8.9 8.9 - 12.7 fL    Platelets 219 149 - 390 Thousands/uL    nRBC 0 /100 WBCs    Neutrophils Relative 75 43 - 75 %    Immat GRANS % 2 0 - 2 %    Lymphocytes Relative 14 14 - 44 %    Monocytes Relative 9 4 - 12 %    Eosinophils Relative 0 0 - 6 %    Basophils Relative 0 0 - 1 %    Neutrophils Absolute 6.14 1.85 - 7.62 Thousands/µL    Immature Grans Absolute 0.15 0.00 - 0.20 Thousand/uL    Lymphocytes Absolute 1.16 0.60 - 4.47 Thousands/µL    Monocytes Absolute 0.73 0.17 - 1.22 Thousand/µL    Eosinophils Absolute 0.03 0.00 - 0.61 Thousand/µL    Basophils Absolute 0.02 0.00 - 0.10 Thousands/µL   Protime-INR    Collection Time: 02/07/24  6:02 PM   Result Value Ref Range    Protime 14.3 11.6 - 14.5 seconds    INR 1.05 0.84 - 1.19   APTT    Collection Time: 02/07/24  6:02 PM   Result Value Ref Range    PTT 36 23 - 37 seconds   B-Type Natriuretic Peptide(BNP)    Collection Time: 02/07/24  6:02 PM   Result Value Ref Range    BNP 57 0 - 100 pg/mL   HS Troponin 0hr (reflex protocol)    Collection Time: 02/07/24  6:02 PM   Result Value Ref Range    hs TnI 0hr 27 \"Refer to ACS Flowchart\"- see link ng/L   TSH, 3rd generation    Collection Time: 02/07/24  6:02 PM   Result Value Ref Range    TSH 3RD GENERATON 1.760 0.450 - 4.500 uIU/mL   Uric acid    Collection Time: 02/07/24  6:02 PM   Result Value Ref Range    Uric Acid 3.6 3.5 - 8.5 mg/dL   HS Troponin I 2hr    Collection Time: 02/07/24  8:00 PM   Result Value Ref Range    hs TnI 2hr 28 \"Refer to ACS Flowchart\"- see link ng/L    Delta 2hr hsTnI " 1 <20 ng/L   Osmolality-If this is regarding a toxic alcohol, STOP. Test is not routinely indicated. Please consult medical  on call for further guidance.    Collection Time: 02/07/24  8:00 PM   Result Value Ref Range    Osmolality Serum 279 (L) 282 - 298 mmol/KG   Platelet count    Collection Time: 02/08/24  5:32 AM   Result Value Ref Range    Platelets 240 149 - 390 Thousands/uL    MPV 9.0 8.9 - 12.7 fL   Cortisol Level,7-9 AM Specimen    Collection Time: 02/08/24  5:32 AM   Result Value Ref Range    Cortisol - AM 7.0 6.7 - 22.6 ug/dL   LD,Blood    Collection Time: 02/08/24  5:32 AM   Result Value Ref Range     (H) 140 - 271 U/L   Body fluid white cell count with differential    Collection Time: 02/08/24  9:37 AM   Result Value Ref Range    Site pleural,right     Color, Fluid Yellow Clear, Colorless,Yellow    Clarity, Fluid Hazy (A) Clear    WBC, Fluid 1,673 /ul   Body Fluid Diff    Collection Time: 02/08/24  9:37 AM   Result Value Ref Range    Total Counted 100     Neutrophils % (Fluid) 88 %    Lymphs % (Fluid) 10 %    Histiocyte % (Fluid) 2 %         XR chest portable    Result Date: 2/8/2024  Narrative: XR CHEST PORTABLE INDICATION: post Right 1 L removal thoracentesis. COMPARISON: 1/3/2024, 2/7/2024 FINDINGS: Stable  elevation/eventration of the right hemidiaphragm. Persistent moderate right pleural effusion. No pneumothorax. Persistent airspace opacity at the right lung base. Lungs are otherwise clear. Stable cardiomediastinal silhouette.     Impression: Persistent moderate right pleural effusion. No pneumothorax. Persistent airspace opacity at the right lung base. Workstation performed: [id number]     Thoracentesis (Bedside)    Result Date: 2/8/2024  Narrative: Ruy Powell MD     2/8/2024  9:32 AM Thoracentesis (Bedside) Date/Time: 2/8/2024 9:31 AM Performed by: Ruy Powell MD Authorized by: Ruy Powell MD  Patient location:  Bedside Consent:   Consent obtained:  Written   Consent given  by:  Patient   Risks discussed:  Bleeding, infection, pain, pneumothorax, nerve damage and incomplete drainage   Alternatives discussed:  No treatment Universal protocol:   Patient identity confirmed:  Verbally with patient Indications:   Procedure Purpose: diagnostic    Indications: pleural effusion  Anesthesia (see MAR for exact dosages):   Anesthesia method:  Local infiltration   Local anesthetic:  Lidocaine 1% w/o epi Procedure details:   Preparation: Patient was prepped and draped in usual sterile fashion    Standard thoracentesis cath kit used: Yes    Patient position:  Sitting   Laterality:  Right   Location:  Lateral   Intercostal space:  7th   Puncture method:  Over-the-needle catheter   Ultrasound guidance: yes    Reason for ultrasound: Identify fluid collection and guide catheter placement.    Ultrasound image availability:  Not obtained due to urgency   Sterile ultrasound techniques: Sterile gel and sterile probe covers were used    Indwelling catheter placed: no    Number of attempts:  1   Needle gauge:  18   Catheter size:  8 Fr   Drainage color:  Yellow   Drainage characteristics:  Clear   Fluid removed amount:  1000 Post-procedure details:   Patient tolerance of procedure:  Tolerated well, no immediate complications Comments:    Written consent, 1 L removed from right thoracentesis, sending labs. If malignant he may benefit from a indwelling pleural catheter RN aware of lab specimen     CTA ED chest PE Study    Result Date: 2/7/2024  Narrative: CTA - CHEST WITH IV CONTRAST - PULMONARY ANGIOGRAM INDICATION:   SOB, metastatic cancer. Per my review of the medical record, newly diagnosed metastatic non-small cell lung cancer COMPARISON: PET/CT 2/7/2024, chest CT 1/11/2024. TECHNIQUE: CT angiogram timed for optimal opacification of the pulmonary arteries.  Axial, sagittal, and coronal 2D reformats created from source data.  Coronal 3D MIP postprocessing on the acquisition scanner. Radiation dose length  product (DLP):  346 mGy-cm .  Radiation dose exposure minimized using iterative reconstruction and automated exposure control. IV Contrast:  85 mL of iohexol (OMNIPAQUE) FINDINGS: PULMONARY ARTERIES:  No pulmonary embolus. LUNGS: Moderate emphysema. Worsening groundglass opacity in the right middle lobe. Moderate compressive atelectasis in the right lower lobe. Moderate emphysema. PLEURA: Persistent large right effusion. Trace left effusion. HEART/GREAT VESSELS: Normal heart size. Small pericardial effusion. Mild coronary artery calcification indicating atherosclerotic heart disease. MEDIASTINUM AND CLEMENTINA: Redemonstration of tumor encasing the distal trachea and right bronchi. Tumor in the right anterior mediastinum extending into the lower neck bilaterally. Persistent encasement and narrowing of both brachiocephalic veins and the SVC  with reflux of contrast into mediastinal collaterals in the azygos and hemiazygos veins.. CHEST WALL AND LOWER NECK: Mild bilateral axillary lymphadenopathy. UPPER ABDOMEN: Multiple low-attenuation lesions in the liver are due to cysts and metastases. Right renal cysts, one hyperdense. OSSEOUS STRUCTURES: Redemonstration of destruction of the manubrium and upper body of the sternum. Metastatic disease involving the right first and ninth ribs and the left third rib. Multiple subtle lytic metastases throughout the spine.     Impression: No pulmonary embolus. Persistent large right pleural effusion. Worsening groundglass opacity in the right middle lobe, of uncertain etiology. Redemonstration of tumor encasing the distal trachea and right bronchi extending into the anterior mediastinum and lower neck bilaterally encasing and narrowing of the brachiocephalic veins and SVC and occlusion of the right bronchi. Mild bilateral axillary lymphadenopathy, possibly metastatic. Liver metastases better shown on prior study. Multiple bone metastases. 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 COMPARISON: PET scan from 2/7/2024 TECHNIQUE: Multiplanar, multisequence imaging of the brain was performed before and after gadolinium administration. IV Contrast:  6 mL of Gadobutrol injection (SINGLE-DOSE) IMAGE QUALITY:   Diagnostic. FINDINGS: BRAIN PARENCHYMA:  There is no discrete mass, mass effect or midline shift. There is no intracranial hemorrhage.  Normal posterior fossa.  Diffusion imaging is unremarkable. A few small scattered hyperintensities on T2/FLAIR imaging are noted in the periventricular and subcortical white matter demonstrating an appearance that is statistically most likely to represent minimal to mild microangiopathic change. Probable right parietal developmental venous anomaly. No enhancing lesion is identified. VENTRICLES:  Normal for the patient's age. SELLA AND PITUITARY GLAND:  Normal. ORBITS:  Normal. PARANASAL SINUSES:  Normal. VASCULATURE:  Evaluation of the major intracranial vasculature demonstrates appropriate flow voids. CALVARIUM AND SKULL BASE: 5 mm T2 hyperintense, mildly T1 hypointense lesion within the right parietal calvarium (5:20). T1 hypointense, T2 hyperintense lesion involving the right C1 lateral mass. T1 hypointense signal within the left side of the C2 vertebral body and throughout the C3 vertebral bodies. T1 hypointense signal extends into the left posterior elements at C2 and C3. previously questioned left mandibular angle lesion is suboptimally evaluated due to field-of-view. EXTRACRANIAL SOFT TISSUES:  Normal.     Impression: -No intracranial metastatic disease. No acute intracranial abnormality. -Indeterminant 5 mm T2 hyperintense mildly T1 hypointense lesion within the right parietal calvarium. Differential includes metastatic disease  although no FDG uptake was noted in this region on subsequent PET scan and this lesion is not as T1 hypointense  as below mentioned cervical lesions. Comparison with prior imaging would be helpful. 3-month follow-up exam can be considered to document stability. -Suboptimally evaluated T1 hypointense lesions within the right C1 lateral mass, C2 and C3 vertebral bodies including the left-sided posterior elements. There was associated FDG uptake on subsequent PET scan, suspicious for metastatic disease. Dedicated MRI of the cervical spine with and without contrast can be obtained for complete evaluation. The study was marked in EPIC for significant notification. Workstation performed: [id number]     NM PET CT skull base to mid thigh    Result Date: 2/7/2024  Narrative: PET/CT SCAN INDICATION: Newly diagnosed non-small cell lung cancer. 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 Multiplanar attenuation corrected and non attenuation corrected PET images are available for interpretation, and contiguous, low dose, axial CT sections were obtained from the skull vertex through the femurs. Intravenous contrast material was not utilized. This examination, like all CT scans performed in the [facility], was performed utilizing techniques to minimize radiation dose exposure, including the use of iterative reconstruction and automated exposure control. Fasting serum glucose: 83 mg/dl FINDINGS: VISUALIZED BRAIN: No acute abnormalities are seen. HEAD/NECK: FDG avid peggy disease in the lower neck bilaterally. In the left lower neck, conglomerate adenopathy demonstrates SUV max of 5. This measures on the order of 3 cm in AP dimension image 95 series 3. CT  images: No additional significant findings. CHEST: Extensive FDG avid superior mediastinal disease anteriorly extending to the anterior chest wall, SUV max 11 at the level of the manubrium. Mass is not well-defined on CT. Ill-defined soft tissue thickening in the right perihilar region demonstrates SUV max of 4.9. Small focus of FDG uptake in the left hilar region, SUV max 3.3. Mild FDG uptake in shotty appearing axillary lymph nodes bilaterally, nonspecific. Previously noted tiny pulmonary nodules not well seen on low-dose CT. These are likely too small to assess by PET. CT images: Moderate to large right pleural effusion, increased from prior CT. Stable small to moderate pericardial effusion. Scattered emphysematous changes of the lung fields. ABDOMEN: Scattered FDG-avid hepatic lesions. Lesion at the left lateral margin of the liver demonstrates SUV max of 9. On prior CT in retrospect there is likely a 1.9 cm ovoid hypodense lesion. This is difficult to visualize on MRI due to motion at this level. The lesion in the left lobe of the liver just lateral to the falciform ligament demonstrates SUV max of 5.9. No obvious findings limited CT. This measured 1.1 cm on prior MRI. Two lesions identified at the dome of the liver, SUV max of 5.5 posteriorly. No obvious findings on the limited CT. These measure on the order of 1 cm on the prior MRI. FDG uptake in scattered small retroperitoneal lymph nodes. A retrocaval lymph node demonstrates a SUV max of 3.2. See image 167 series 1200. This measures 4 mm short axis image 193 series 3. Limited assessment for renal lesions due to normal excretion of FDG by the genitourinary system. CT images: No additional significant findings. PELVIS: There are a few foci of FDG uptake along the left anterior thigh musculature. A focus here demonstrates SUV max of 3.4. See for example, image 241 series 1200. Intramuscular metastasis is not excluded. Minimal activity in the left hemipelvis  likely physiologic ureteral activity. No suspicious FDG avid soft tissue lesions. CT images: No additional significant finding. OSSEOUS STRUCTURES: Innumerable FDG-avid osseous lesions compatible with widespread osseous metastasis. Some of these are lytic on CT. Reference lesions as noted below: Lesion at the right lateral mass of C1 demonstrates SUV max of 8.2. Lesion at the left mandible posteriorly demonstrates SUV max of 6.5. Left glenoid lesion demonstrates SUV max of 9.4. Manubrial lesion demonstrates SUV max of 11. Left scapular body lesion demonstrates SUV max 7.9. T4 vertebral body lesion demonstrates SUV max of 11. L4 vertebral body lesion demonstrates SUV max of 13. Left sacral lesion demonstrates SUV max of 12. The right anterior iliac lesion demonstrates SUV max of 11. Left posterior iliac lesion demonstrates SUV max of 11. Right posterior iliac lesion demonstrates SUV max of 12. Left posterior acetabular lesion demonstrates SUV max of 10. Right proximal femur lesion at the level at the intertrochanteric region demonstrates SUV max of 7.9.     Impression: 1. FDG avid intrathoracic disease most extensive at the superior mediastinum and right perihilar region concerning for malignancy. 2. Extensive FDG avid peggy disease in the lower neck, chest and abdomen suspicious for metastasis. 3. Scattered FDG-avid hepatic lesions concerning for metastasis. 4. Innumerable FDG avid osseous lesions compatible with widespread osseous metastasis. 5. A few small foci of FDG uptake along the left anterior thigh musculature for which intramuscular metastasis is not excluded. Workstation performed: [id number]     MRI abdomen w wo contrast    Result Date: 1/31/2024  Narrative: MRI - ABDOMEN - WITH AND WITHOUT CONTRAST INDICATION: 63 years / Male. K76.9: Liver disease, unspecified C80.1: Malignant (primary) neoplasm, unspecified M89.8X9: Other specified disorders of bone, unspecified site. Liver lesion and increased pain.  Based on epic prior imaging reports and pathology, patient had a sternal lytic lesion biopsy demonstrating metastatic non-small cell carcinoma. COMPARISON: Chest CT from 1/11/2024. TECHNIQUE: Multiplanar/multisequence MRI of the abdomen was performed before and after administration of contrast. IV Contrast: 6 mL of Gadobutrol injection (SINGLE-DOSE) FINDINGS: LOWER CHEST: Moderate right pleural effusion and trace left pleural effusion. Small pericardial effusion. LIVER: Normal in size and configuration. In segment 4B of the liver, there is a 1.1 cm mildly T2 hyperintense, and hypoenhancing liver lesion suspicious for metastasis (series 7 image 19, series 11 images 60-63, images 178-183.) There is altered perfusion of the liver distal to this lesion. Similarly, there are 2 additional adjacent lesions in the right hepatic dome, each 1 cm, also suspicious for metastatic disease (series 7 image 9 and series 11 image 30, 150.) There are are numerous small simple cysts scattered throughout the rest of the liver. Patent hepatic and portal veins. BILE DUCTS: No intrahepatic or extrahepatic bile duct dilation. GALLBLADDER: Normal PANCREAS: Unremarkable. ADRENAL GLANDS: Unremarkable. SPLEEN: Normal. KIDNEYS/PROXIMAL URETERS: No hydroureteronephrosis. No suspicious renal mass. Several small simple renal cysts. BOWEL: No dilated loops of bowel. PERITONEUM/RETROPERITONEUM: No ascites. LYMPH NODES: No abdominal lymphadenopathy. VESSELS: No aneurysm. ABDOMINAL WALL: Unremarkable BONES: No suspicious osseous lesion.     Impression: In segment 4B of the liver, there is a 1.1 cm mildly T2 hyperintense, and hypoenhancing liver lesion suspicious for metastasis (series 7 image 19, series 11 images 60-63, images 178-183.) Similarly, there are 2 additional adjacent lesions in the right hepatic dome, each 1 cm, also suspicious for metastatic disease (series 7 image 9 and series 11 image 30, 150.) Workstation performed: [id number]     XR  follow up    Result Date: 1/27/2024  Narrative: ORBITS INDICATION:   K76.9: Liver disease, unspecified C80.1: Malignant (primary) neoplasm, unspecified M89.8X9: Other specified disorders of bone, unspecified site. COMPARISON:  None VIEWS:  XR FOLLOW UP (NO CHARGE) FINDINGS: There is no evidence of radiopaque orbital foreign body. The paranasal sinuses are clear.     Impression: No radiopaque orbital foreign body. Workstation performed: [id number]     IR biopsy bone    Result Date: 1/26/2024  Narrative: CT-scan guided needle biopsy of the manubrium History: Patient with erosive lesion in the manubrium. Conscious sedation time: 45 minutes Technique: This examination, like all CT scans performed in the [facility], was performed utilizing techniques to minimize radiation dose exposure, including the use of iterative reconstruction and automated exposure control. The patient was brought to the CT scanner and placed supine on the table. After axial images were obtained through the region of interest an area of the skin was then marked, prepped, and draped in usual sterile fashion. Lidocaine was administered to the  skin and a small skin incision was made. A A 14-gauge cannula was advanced into the manubrial lesion and multiple 15-gauge cores were obtained. Specimen was submitted in both formalin and RPMI. The needle was removed and hemostasis was achieved. The patient tolerated the procedure well and suffered no complications.     Impression: Impression: Successful percutaneous core biopsy of a manubrial lesion. Workstation performed: [id number]     CT chest w contrast    Result Date: 1/11/2024  Narrative: CT CHEST WITH IV CONTRAST INDICATION:   R91.8: Other nonspecific abnormal finding of lung field. COMPARISON: Chest radiograph 1/3/2024. No prior CTs available for comparison. TECHNIQUE: CT examination of the chest was performed. Multiplanar 2D reformatted images were created from the source data. This  examination, like all CT scans performed in the [facility], was performed utilizing techniques to minimize radiation dose exposure, including the use of iterative reconstruction and automated exposure control. Radiation dose length product (DLP) for this visit:  295 mGy-cm IV Contrast:  85 mL of iohexol (OMNIPAQUE) FINDINGS: LUNGS: Mild to moderate emphysematous changes. There is no tracheal or endobronchial lesion. Segmental right posterior upper lobe atelectasis. Curvilinear opacity in the subpleural right lung apex adjacent to the mass described below, likely focal atelectasis. 2 mm nodule in the right upper lobe anteriorly (series 4, image 39). 3 mm right upper lobe groundglass nodule measuring 3 mm (series 4 image 21). Polygonal groundglass opacity in the anterior left upper lobe (for example, series 4 image 66), compatible with subsegmental atelectasis and mild inflammatory changes. Bilateral calcified granulomata. PLEURA: Small right pleural effusion with adjacent atelectasis. Trace left pleural effusion. Focal pleural thickening/nodularity measuring up to 7 mm adjacent to the anterior mediastinal mass, suspicious ford represent pleural involvement. HEART/GREAT VESSELS: Normal heart size. Small to moderate pericardial effusion. Mild coronary artery calcification. No thoracic aortic aneurysm. MEDIASTINUM AND CLEMENTINA: Masslike fullness of the anterior mediastinum measuring approximately 3.6 x 3.9 cm (please note that this measurement also includes a portion of the involved manubrium) adjacent to a destructive lytic lesion involving the manubrium and sternum. The mass circumscribes the great vessels including the proximal portion of the right brachiocephalic vein, and left brachiocephalic vein, SVC, and proximal aortic arch. The mass also extends inferiorly into the right paratracheal mediastinum, subcarinal mediastinum, and right hilum. There is an additional 1.1 x 1.0 cm right hilar lymph node  (series 2, image 68) CHEST WALL AND LOWER NECK: Mass in the anterior chest wall with destruction of the mid to right manubrium and sternum, with extension into the adjacent soft tissues, as well as the anterior mediastinum. There is narrowing of the left brachiocephalic vein, right brachiocephalic vein, and SVC, which are circumscribed by this mass. There appear to be 2 adjacent mildly enlarged right supraclavicular nodes measuring up to 1.1 cm in short axis (series 2, image 8), suspicious for peggy involvement. VISUALIZED STRUCTURES IN THE UPPER ABDOMEN: There are a few scattered hepatic cysts. However there are additional subcentimeter low-attenuation lesions in the liver, too small to characterize. 1.5 cm intermediate attenuation lesion arising from the posterior right kidney, which is incompletely characterized on this single phase postcontrast examination. OSSEOUS STRUCTURES: There is a lytic destructive lesion in the mid-right manubrium and upper sternum.     Impression: 1. There is a destructive mass centered in the right upper-anterior mediastinum, with resultant destruction of the manubrium and sternum, and adjacent right medial pleural thickening that is likely involved. The mass also circumscribes and narrows the right brachiocephalic vein, SVC and left brachiocephalic vein.  The mass extends inferiorly and posteriorly to involve the right paratracheal and subcarinal mediastinum, as well as the right hilum. Findings are highly suspicious for malignant involvement, of which differential considerations include primary lung cancer, sarcoma, or lymphoma. Further management considerations may include subspecialty consultation, tissue sampling, and/or FDG PET/CT. 2. Right supraclavicular lymphadenopathy, suspicious for peggy involvement. In addition to the mediastinal/right hilar direct infiltration by the mass as described above, there is another discrete/separate 1.1 cm right hilar lymph node that is also  likely involved. 3. 2 mm right upper lobe nodule, and a 3 mm right upper lobe groundglass nodule. Recommend attention to on subsequent follow-up. 3. Small right and trace left pleural effusions, similar to the chest radiograph of 1/3/2024. 4. There are a few subcentimeter low-attenuation hepatic lesions, which are too small to characterize. However, metastatic disease is not excluded in light of the above. Consider further evaluation with abdominal MRI. 5. 1.5 cm intermediate attenuation lesion arising from the posterior right kidney, which is incompletely characterized on this single phase postcontrast examination. This can be evaluated on MRI as recommended above. I personally discussed this study with ZHANG GARZA on 1/11/2024 12:30 PM. Resident: MARISOL GRANADOS I, the attending radiologist, have reviewed the images and agree with the final report above. Workstation performed: [id number]     ECOG :2      Assessment and plan:  #1.  Poorly differentiated adenocarcinoma of the lung with lesions involving the manubrium, extension into the right perihilar, subcarinal mediastinum, bilateral pulmonary nodules, bony metastasis, hepatic metastasis    MRI of the brain showed no evidence of parenchymal brain metastasis    Status post thoracentesis today 1.1 L out with more relief of dyspnea and less oxygen requirement    Rule out cord compression with tingling of the lower extremities, will order MRI of the thoracic and lumbar spine    Radiation oncology consult    He needs treatment with Alimta/carboplatin as soon as possible and then pembrolizumab as an outpatient along with chemotherapy

## 2024-02-08 NOTE — ASSESSMENT & PLAN NOTE
Sodium level upon admission: 129  Secondary to poor oral intake.  Treatment: IV hydration.  Additional Studies: TSH, serum osmolality, urine osmolality, sodium urine, uric acid and cortisol level in AM.  Monitor BMP.

## 2024-02-08 NOTE — PROGRESS NOTES
"[facility]  Progress Note  Name: Abdulaziz Gomez I  MRN: [id number]  Unit/Bed#: S -01 I Date of Admission: 2/7/2024   Date of Service: 2/8/2024 I Hospital Day: 1    Assessment/Plan   * Acute respiratory failure with hypoxia (HCC)  Assessment & Plan  Presentation: Patient presents after referral from outpatient palliative care provider due to complaints of worsening shortness of breath and worsening pain associated with recent diagnosis of lung cancer with metastases.   Suspect multifactorial in the setting of  large right pleural effusion and underlying lung cancer with large tumor burden and tumor encasing the trachea and bronchi.    CTA PE study: \"No pulmonary embolus. Persistent large right pleural effusion. Worsening groundglass opacity in the right middle lobe, of uncertain etiology. Redemonstration of tumor encasing the distal trachea and right bronchi extending into the anterior mediastinum and lower neck bilaterally encasing and narrowing of the brachiocephalic veins and SVC and occlusion of the right bronchi.\"  Upon ED arrival, SpO2 87% on room air.  Currently SpO2 95% on 6 L NC.  Wean supplemental O2 to maintain SpO2 > 88%.  Respiratory protocol.  Pulmonology consult --appreciated.    Patient is s/p thoracentesis of 1 L.  Wean and titrate oxygen as tolerated.    Hyponatremia  Assessment & Plan  Sodium level upon admission: 129  Secondary to poor oral intake likely SIADH in the setting of extensive pulmonary tumor burden..  TSH and cortisol within normal limits.  Additional Studies: Follow-up on serum osmolality, urine osmolality, sodium urine, uric acid .  Monitor BMP.  Appears euvolemic.  DC further IV fluids    Pleural effusion on right  Assessment & Plan  Large right pleural effusion noted on CTA PE study.  No prior thoracentesis.  S/p thoracentesis with 1 L of fluid removal.  Follow up and fluid analysis and cytology results.  If has recurrent rapidly accumulating " "pleural effusion, may benefit from getting Pleurx catheter placed.    Cancer related pain  Assessment & Plan  Patient known to Dr. Maynard of Palliative care who referred patient to present to ED due to worsening shortness of breath and intractable pain. Patient endorses back pain with radiation to left lower extremity.  Seen by palliative medicine and adjusted pain regimen :  Scheduled medications:  -PO Dilaudid 2 mg q8 hours ATC     PRN medications  -PO Dilaudid 2 mg q4 hours PRN for moderate pain  -PO Dilaudid 4 mg q4 hours PRN for severe pain  -IV dilaudid 0.2 mg q4 hours PRN for BTP     Palliative care consult appreciated.  Continue with bowel regimen.    Metastatic non-small cell lung cancer (HCC)  Assessment & Plan  Patient recently diagnosed with 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  CTA PE study : \"Redemonstration of tumor encasing the distal trachea and right bronchi extending into the anterior mediastinum and lower neck bilaterally encasing and narrowing of the brachiocephalic veins and SVC and occlusion of the right bronchi. Mild bilateral axillary lymphadenopathy, possibly metastatic. Liver metastases better shown on prior study.\"  Patient reports 7 lb weight loss in the past few weeks.  Oncology consult.  Rad-Onc consult.  Will get MRI of the thoracic and lumbar spine to rule out cord compression because of low back pain and osseous mets  on PET scan.    Current every day smoker  Assessment & Plan  Patient reports smoking 1 PPD previously, he endorses that he is now down to 3-4 cigarettes daily.  Encouraged smoking cessation.  NRT.               VTE Pharmacologic Prophylaxis: VTE Score: 6 High Risk (Score >/= 5) - Pharmacological DVT Prophylaxis Ordered: enoxaparin (Lovenox). Sequential Compression Devices Ordered.    Mobility:   Basic Mobility Inpatient Raw Score: 17  JH-HLM Goal: 5: Stand one or more mins  JH-HLM Achieved: 4: Move to chair/commode  HLM Goal achieved. " Continue to encourage appropriate mobility.    Patient Centered Rounds: I performed bedside rounds with nursing staff today.   Discussions with Specialists or Other Care Team Provider: Discussed with pulmonology    Education and Discussions with Family / Patient: Updated  (wife and daughter) at bedside.    Total Time Spent on Date of Encounter in care of patient: 30 mins. This time was spent on one or more of the following: performing physical exam; counseling and coordination of care; obtaining or reviewing history; documenting in the medical record; reviewing/ordering tests, medications or procedures; communicating with other healthcare professionals and discussing with patient's family/caregivers.    Current Length of Stay: 1 day(s)  Current Patient Status: Inpatient   Certification Statement: The patient will continue to require additional inpatient hospital stay due to ongoing management and workup of metastatic lung cancer  Discharge Plan: Anticipate discharge in >72 hrs to home.    Code Status: Level 1 - Full Code    Subjective:   Patient seen and examined at bedside.  Still with shortness of breath however slightly improved after thoracentesis.  Denies any chest pain or worsening cough.  Continues to complain of low back pain and neck pain.    Objective:     Vitals:   Temp (24hrs), Av.6 °F (36.4 °C), Min:97.5 °F (36.4 °C), Max:97.8 °F (36.6 °C)    Temp:  [97.5 °F (36.4 °C)-97.8 °F (36.6 °C)] 97.5 °F (36.4 °C)  HR:  [] 95  Resp:  [16-22] 16  BP: (113-159)/() 140/84  SpO2:  [87 %-98 %] 92 %  Body mass index is 19.89 kg/m².     Input and Output Summary (last 24 hours):     Intake/Output Summary (Last 24 hours) at 2024 1144  Last data filed at 2024 0900  Gross per 24 hour   Intake 480 ml   Output 800 ml   Net -320 ml       Physical Exam:   Physical Exam  Constitutional:       General: He is not in acute distress.  HENT:      Head: Normocephalic and atraumatic.       Mouth/Throat:      Mouth: Mucous membranes are moist.   Eyes:      Pupils: Pupils are equal, round, and reactive to light.   Cardiovascular:      Rate and Rhythm: Normal rate and regular rhythm.   Pulmonary:      Effort: Pulmonary effort is normal.      Comments: Diminished breath sounds on the right side.  No wheezing or rales appreciated.  Abdominal:      General: Abdomen is flat. Bowel sounds are normal.      Palpations: Abdomen is soft.   Musculoskeletal:      Cervical back: Neck supple.      Right lower leg: No edema.      Left lower leg: No edema.   Lymphadenopathy:      Cervical: No cervical adenopathy.   Skin:     General: Skin is warm.   Neurological:      General: No focal deficit present.      Mental Status: He is alert and oriented to person, place, and time. Mental status is at baseline.      Cranial Nerves: No cranial nerve deficit.      Motor: No weakness.   Psychiatric:         Mood and Affect: Mood normal.          Additional Data:     Labs:  Results from last 7 days   Lab Units 02/08/24  0532 02/07/24  1802   WBC Thousand/uL  --  8.23   HEMOGLOBIN g/dL  --  12.2   HEMATOCRIT %  --  34.8*   PLATELETS Thousands/uL 240 219   NEUTROS PCT %  --  75   LYMPHS PCT %  --  14   MONOS PCT %  --  9   EOS PCT %  --  0     Results from last 7 days   Lab Units 02/07/24  1802   SODIUM mmol/L 129*   POTASSIUM mmol/L 4.1   CHLORIDE mmol/L 90*   CO2 mmol/L 28   BUN mg/dL 12   CREATININE mg/dL 0.62   ANION GAP mmol/L 11   CALCIUM mg/dL 9.5   ALBUMIN g/dL 3.8   TOTAL BILIRUBIN mg/dL 0.91   ALK PHOS U/L 84   ALT U/L 16   AST U/L 25   GLUCOSE RANDOM mg/dL 107     Results from last 7 days   Lab Units 02/07/24  1802   INR  1.05     Results from last 7 days   Lab Units 02/07/24  0752   POC GLUCOSE mg/dl 83               Lines/Drains:  Invasive Devices       Peripheral Intravenous Line  Duration             Peripheral IV 02/07/24 Right Antecubital <1 day                          Imaging: Reviewed radiology reports from this  admission including: chest CT scan    Recent Cultures (last 7 days):         Last 24 Hours Medication List:   Current Facility-Administered Medications   Medication Dose Route Frequency Provider Last Rate    acetaminophen  650 mg Oral Q6H PRN Yael Aranda, JARADNP      bisacodyl  10 mg Rectal Daily PRN Violeta Ruiz MD      enoxaparin  40 mg Subcutaneous Daily Yael McGsondra, CRNP      HYDROmorphone  2 mg Oral Q8H SAHNTAL Jb Shiu, DO      HYDROmorphone  2 mg Oral Q4H PRN Jb Shiu, DO      Or    HYDROmorphone  4 mg Oral Q4H PRN Jb Shiu, DO      HYDROmorphone  0.2 mg Intravenous Q4H PRN Yael Manoj, JARADNP      methylPREDNISolone sodium succinate  40 mg Intravenous Q8H SHANTAL Yael McGeelinh, CRNP      naloxone  0.04 mg Intravenous Q1MIN PRN Jb Shiu, DO      nicotine  1 patch Transdermal Daily Yael Manoj, JARADNP      polyethylene glycol  17 g Oral Daily PRN Jb Shiu, DO      polyethylene glycol  17 g Oral Daily Violeta Ruiz MD      senna-docusate sodium  1 tablet Oral BID Violeta Ruiz MD          Today, Patient Was Seen By: Violeta Ruiz MD    **Please Note: This note may have been constructed using a voice recognition system.**

## 2024-02-08 NOTE — SOCIAL WORK
"Palliative LSW saw patient at the bedside today. LSW appreciates the opportunity to provide patient/family with inpatient emotional support and guidance while patient continues to receive medical attention from the medical team.     Topics discussed: Met with pt at bedside for continued support. Pt reports he is feeling \"not too bad\" today. Pt spent time reflecting on current medical status. He had a thoracentesis completed today which brought him some improvement in how he has been feeling. He is currently pending an MRI for his spine and is awaiting speaking with oncology and [facility]. Pt remains focused on next steps for his CA tx. He feels his pain is well-controlled and his breathing has improved (currently weaned from 6L O2 to 3L. Pt spent time reflecting on the difficulties of his dx--mostly related to the fact that he will not be able to work moving forward and will be forced to retire. Pt has always had a very strong work ethic and has held a job since age 11. He is finding it difficult to accept the fact that his mind is still focused on work, but his body is unable to tolerate it. Pt describes himself as \"beating himself up\" currently as he feels his prior decisions have led to his diagnosis. Time spent allowing pt to process his feelings and emotional support was provided.    Pt and LSW later joined by pt's wife, dtr, and son. Pt's dtr spent time updating son of pt's dx, current medical status, and anticipated plans for chemo/radiation. Family remains in agreement with pt remaining hospitalized to begin CA tx.     Pt/family spent time reminiscing about family and memories over the years. Pt and family have a strong meño. They also enjoy a strong sense of humor. Pt/family expressed appreciation for on-going support/assistance from [facility] team.     LSW will continue to follow.    Areas that need follow-up: Emotional Support  Resources given: None  Others present: Pt's wife, dtr, and son      I have spent 60 " minutes with Patient and family today in which greater than 50% of this time was spent in counseling/coordination of care regarding Counseling / Coordination of care.       LSW will continue to follow as requested by the medical team, patient, or family

## 2024-02-08 NOTE — ASSESSMENT & PLAN NOTE
Large right pleural effusion noted on CTA PE study.  No prior thoracentesis.  Pulmonology versus IR -- will consult Pulmonology first.

## 2024-02-08 NOTE — ASSESSMENT & PLAN NOTE
Smoked 1 ppd for years.  Currently smoking 3 cigarettes a day.  -Discussed importance of smoking cessation especially in setting of lung cancer.

## 2024-02-08 NOTE — ASSESSMENT & PLAN NOTE
Goal at this time - remains focused on disease directed treatment. Patient open to chemotherapy and radiation as option for treatments.    -Patient seen by Palliative Social work for further support and completion of ACP. Patient would like his daughter to serve as substitute medical decision maker in the event he were to lack medical decision making capacity. [facility] ACP document completed with Palliative , Christine Nieto.

## 2024-02-08 NOTE — ASSESSMENT & PLAN NOTE
Patient reports 7 pound weight loss in the past 1-2 weeks.  Baseline weight of 145 pounds (patient weighing 137 pounds today).

## 2024-02-08 NOTE — UTILIZATION REVIEW
NOTIFICATION OF INPATIENT ADMISSION   AUTHORIZATION REQUEST   SERVICING FACILITY:   [place] [address]  Tax ID: [id number]  NPI: [id number]   ATTENDING PROVIDER:  Attending Name and NPI#: Violeta Ruiz Md [[id number]]  Address: [address] [place] [address]  Phone: [phone]     ADMISSION INFORMATION:  Place of Service: Inpatient [facility]  Place of Service Code: 21  Inpatient Admission Date/Time: 2/7/24  8:14 PM  Discharge Date/Time: No discharge date for patient encounter.  Admitting Diagnosis Code/Description:  Pleural effusion [J90]  Hypoxia [R09.02]  Metastatic disease (HCC) [C79.9]  Intractable pain [R52]     UTILIZATION REVIEW CONTACT:  Juanjose Jang Utilization   Network Utilization Review Department  Phone: [phone]  Fax: [phone]  Email: [email or web address]  Contact for approvals/pending authorizations, clinical reviews, and discharge.     PHYSICIAN ADVISORY SERVICES:  Medical Necessity Denial & Cexb-pd-Upji Review  Phone: [phone]  Fax: [phone]  Email: [email or web address]     DISCHARGE SUPPORT TEAM:  For Patients Discharge Needs & Updates  Phone: [phone] opt. 2 Fax: [phone]  Email: [email or web address]

## 2024-02-08 NOTE — CONSULTS
Inpatient Consultation - Radiation Oncology   Abdulaziz Gomez 63 y.o. male MRN: [id number]  Unit/Bed#: S -01 Encounter: [id number]. Consult Requested by Violeta Ruiz MD    Inpatient consult to Radiation Oncology  Consult performed by: Felecia Vega MD  Consult ordered by: MARY Sampson      Reason for Consult / Principal Problem: Multiple metastases, shortness of breath  Hx and PE limited by: None    Assessment/Plan   Abdulaziz Gomez is a 63 y.o. male seen previously in the outpatient setting with metastatic NSCLC with CT Chest notable for a 3.9cm destructive mass centered int he right upper anterior mediastinum with involvement of the sternum/manumbrian and upper mediastinal vessels. IR biopsy on 1/23/24 showed metastatic NSCLC.  PET/CT on 2/7/2024 showed intrathoracic disease most extensive at the superior mediastinum and right perihilar region, with extensive peggy disease in the lower neck, chest, and abdomen suspicious for metastases.  There were innumerable FDG avid osseous lesions compatible with widespread osseous metastases (innumerable bony lesions, including lesions in the cervical, thoracic, and lumbosacral spine, pelvis, and femur).  MRI brain showed no intracranial metastatic disease though there is a hypointense right parietal calvarial lesion. He was admitted with respiratory failure and for expedited multidisciplinary management.    I had a detailed discussion with the patient regarding the extensive burden of disease on his PET scan, and explained that he will require systemic therapy as has been recommended by his medical oncologist.  I reviewed that radiation therapy is indicated as previously discussed for local control from his destructive upper anterior mediastinal/parasternal mass.  We reviewed that he has innumerable osseous metastatic lesions and that radiation therapy cannot encompass all of these areas.  He was recommended an MRI of the spine by medical oncology  and I would agree with this to evaluate for any critical neurologic compromise.    We reviewed palliative radiation therapy for the upper mediastinal/thoracic disease which can range from 5-10 sessions and the associated risks, benefits, and alternatives. Side effects may include but are not limited to fatigue, radiation esophagitis, radiation pneumonitis, acute skin reaction, pulmonary fibrosis, increased risk of cardiac events and secondary malignancy, injury to nerves, bone weakness/fracture, hypothyroidism, hoarseness, and scar tissue. We discussed that the pain/disease may persist/worsen despite radiation therapy, especially if it is related to a different focus of disease or degenerative. I also explained that palliative radiation therapy would not cover all known disease, and that he require systemic therapy. The patient agreed to proceed with radiation therapy, and informed consent has already been signed for the thorax. We discussed spinal RT as well, and informed consent was signed in case he requires treatment to the spine pending workup.    Plan:  Patient has previously agreed to radiation therapy to the intrathoracic disease, and CT simulation will be arranged as an inpatient on 2/9/24. Treatment expected to start shortly thereafter pending complexity of plan. Radiation therapy can be initiated/continued as an inpatient our outpatient if medically stable for discharge.  Agree with medical oncology recommendation for MRI imaging of the cervical, thoracic, and lumbar spine, as well as neurosurgical consultation.  Appreciate medical oncology recommendations regarding systemic therapy  Remainder of management per primary team.      Felecia Vega MD  Department of Radiation Oncology  [facility]    History of Present Illness   HPI: Abdulaziz Gomez is a 63 y.o. year old male who presents with acute respiratory failure in the context of metastatic lung cancer.  See above for  remainder of workup.    Prior radiation: no  Pacemaker: no    Today he notes hoarseness, shortness of breath, and in general feeling worse over the past day or so.  He notes that his leg gives out from time to time, but denies urinary incontinence, retention, fecal incontinence, saddle anesthesia, or significant weakness on one side of the body.  He notes that his right shoulder pain resolved with pain medication.    Review of Systems   Constitutional:  Positive for activity change.   HENT:  Positive for voice change.    Respiratory:  Positive for shortness of breath.    Cardiovascular: Negative.    Gastrointestinal: Negative.    Genitourinary: Negative.    Musculoskeletal:  Positive for back pain.   Neurological:  Negative for seizures and speech difficulty.   Psychiatric/Behavioral:  Negative for confusion.        Historical Information   Oncology History   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)    Initial Diagnosis    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  On immunostaining, the tumor cells are positive for CK7, Luís-EP4, MOC31, CK19, PAX8, CK5/6, and GATA3 with focal staining for CDX2.  They are negative for CK20, TTF-1, Napsin A, p40, calretinin, WT-1, NKX3.1, SATB2, uroplakin, and CA 19.9.  These results are nonspecific.  The positive staining for Luís-EP4 and MOC31 suggest this is an adenocarcinoma.  The differential would include, upper gastrointestinal/pancreatobiliary, urinary tract/renal and lung.  Clinical and radiographic correlation suggested.  Intradepartmental consultation is in agreement with the diagnosis of carcinoma        2/1/2024 -  Cancer Staged    Staging form: Lung, AJCC 8th Edition  - Clinical: Stage IV (cTX, cN3, cM1) - Signed by Felecia Vega MD on 2/1/2024  Staging comments: No PET/CT or MRI brain at time of staging, pending  workup         Past Medical History:   Diagnosis Date    Metastatic non-small cell lung cancer (HCC)      Past Surgical History:   Procedure Laterality Date    IR BIOPSY BONE  1/23/2024    TONSILLECTOMY Bilateral      Family History   Problem Relation Age of Onset    Diabetes Mother     Heart disease Brother      Social History   Social History     Substance and Sexual Activity   Alcohol Use No     Social History     Substance and Sexual Activity   Drug Use No     Social History     Tobacco Use   Smoking Status Every Day    Current packs/day: 1.00    Average packs/day: 1 pack/day for 50.1 years (50.1 ttl pk-yrs)    Types: Cigarettes    Start date: 1974    Passive exposure: Past   Smokeless Tobacco Never       Meds/Allergies   all current active meds have been reviewed  No Known Allergies    Objective     Intake/Output Summary (Last 24 hours) at 2/8/2024 1621  Last data filed at 2/8/2024 1256  Gross per 24 hour   Intake 480 ml   Output 1000 ml   Net -520 ml     Invasive Devices       Peripheral Intravenous Line  Duration             Peripheral IV 02/07/24 Right Antecubital <1 day                    Physical Exam  General Appearance:  Alert, cooperative, no distress, appears stated age  HEENT: normocephalic/atraumatic  Lungs: Coarseness.  Short of breath.  Abdomen: Non-distended  Extremities: No cyanosis or edema  Skin: No generalized rash or dermatitis  Neurologic: ANOx3, speech and cognition intact.  Strength 5/5 in all extremities.  Gait exam deferred.    Lab Results: I have personally reviewed pertinent reports.    Imaging Studies: I have personally reviewed pertinent films in PACS  EKG, Pathology, and Other Studies: I have personally reviewed pertinent reports.      Code Status: Level 1 - Full Code  Advance Directive and Living Will: Yes    Power of :    POLST:      Counseling / Coordination of Care  Total Time Spent  40 minutes spent reviewing EMR in preparation for visit, with the patient, coordination  with other providers, and documentation.

## 2024-02-08 NOTE — ASSESSMENT & PLAN NOTE
"Presentation: Patient presents after referral from outpatient palliative care provider due to complaints of worsening shortness of breath and worsening pain associated with recent diagnosis of lung cancer with metastases.   Suspect multifactorial in the setting of underlying undiagnosed COPD, large right pleural effusion and lung tumor burden.  CTA PE study: \"No pulmonary embolus. Persistent large right pleural effusion. Worsening groundglass opacity in the right middle lobe, of uncertain etiology. Redemonstration of tumor encasing the distal trachea and right bronchi extending into the anterior mediastinum and lower neck bilaterally encasing and narrowing of the brachiocephalic veins and SVC and occlusion of the right bronchi.\"  Upon ED arrival, SpO2 87% on room air.  Currently SpO2 95% on 5 L NC.  Wean supplemental O2 to maintain SpO2 > 88%.  IV Solu-medrol 40 mg q8h.  Respiratory protocol.  Pulmonology consult -- likely need for thoracentesis   "

## 2024-02-08 NOTE — PROCEDURES
Thoracentesis (Bedside)    Date/Time: 2/8/2024 9:31 AM    Performed by: Ruy Powell MD  Authorized by: Ruy Powell MD    Patient location:  Bedside  Consent:     Consent obtained:  Written    Consent given by:  Patient    Risks discussed:  Bleeding, infection, pain, pneumothorax, nerve damage and incomplete drainage    Alternatives discussed:  No treatment  Universal protocol:     Patient identity confirmed:  Verbally with patient  Indications:     Procedure Purpose: diagnostic      Indications: pleural effusion    Anesthesia (see MAR for exact dosages):     Anesthesia method:  Local infiltration    Local anesthetic:  Lidocaine 1% w/o epi  Procedure details:     Preparation: Patient was prepped and draped in usual sterile fashion      Standard thoracentesis cath kit used: Yes      Patient position:  Sitting    Laterality:  Right    Location:  Lateral    Intercostal space:  7th    Puncture method:  Over-the-needle catheter    Ultrasound guidance: yes      Reason for ultrasound: Identify fluid collection and guide catheter placement.      Ultrasound image availability:  Not obtained due to urgency    Sterile ultrasound techniques: Sterile gel and sterile probe covers were used      Indwelling catheter placed: no      Number of attempts:  1    Needle gauge:  18    Catheter size:  8 Fr    Drainage color:  Yellow    Drainage characteristics:  Clear    Fluid removed amount:  1000  Post-procedure details:     Patient tolerance of procedure:  Tolerated well, no immediate complications  Comments:      Written consent, 1 L removed from right thoracentesis, sending labs. If malignant he may benefit from a indwelling pleural catheter  RN aware of lab specimen

## 2024-02-08 NOTE — ASSESSMENT & PLAN NOTE
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  CTA PE study : \"Redemonstration of tumor encasing the distal trachea and right bronchi extending into the anterior mediastinum and lower neck bilaterally encasing and narrowing of the brachiocephalic veins and SVC and occlusion of the right bronchi. Mild bilateral axillary lymphadenopathy, possibly metastatic. Liver metastases better shown on prior study.\"  Patient reports 7 lb weight loss in the past few weeks.  Oncology consult.  Rad-Onc consult.  Will get MRI of the thoracic and lumbar spine to rule out cord compression because of low back pain and osseous mets  on PET scan.  "

## 2024-02-08 NOTE — CONSULTS
Pulmonary Consultation   Abdulaziz Gomez 63 y.o. male MRN: [id number]  Unit/Bed#: S -01 Encounter: [id number]      Reason for consultation: Pleural effusion, acute hypoxemic respiratory failure, lung adenocarcinoma    Requesting physician: Joseph    Impression:     R pleural effusion s/p R thoracentesis with 1 L removed, postprocedure chest x-ray still has volume in there.  Bedside ultrasound postprocedure also showed he still had volume but procedure was discontinued as he started complaining of some mild chest tightness with cough.  We will send for pleural cytology and infectious workup, if cytology is positive, we discussed he may need a indwelling pleural catheter which we can place.  Acute hypoxemic respiratory failure likely in the setting of right-sided pleural effusion along with underlying malignancy which has been recently diagnosed.  CT chest does show tumor encasement of the trachea and bronchi, has not started treatment yet.  May also need to consider pneumonia in the differential as he has groundglass in the right middle and has a cough but unable to expectorate significantly  Active smoker for the past 50 years about half pack per day  Occupational exposure as    COPD unclassified, no PFT on file, CT does have paraseptal emphysema not on any inhalers, I discussed starting nebulizers but patient would like to hold off for now    Recommendation:    Status post right-sided thoracentesis  Follow-up pleural culture and cytology  May need indwelling Craig catheter which we can place if needed  Obtain sputum sample  Continue with nicotine patch  Start flutter valve and incentive spirometry for recruitment  Awaiting radiation evaluation along with palliative care and oncology  Wean oxygen to keep saturation above 88  Consider starting DuoNebs for emphysema but patient would like to hold off for now    History of Present Illness   HPI:      63-year-old gentleman admitted to medical service  due to acute hypoxemic respiratory failure, sent in from the oncology clinic.  PET scan performed shows metastatic disease, also had CT chest which showed large pleural effusion likely new.    Patient is a active smoker for the past 50 years, currently on 4 to 5 L nasal cannula, very soft-spoken due to profound weakness.  Has a cough but difficult to expectorate.    Unfortunately patient is currently with stage IV adenocarcinoma with bone metastasis which causes him great discomfort.    Review of systems:  12 point review of systems was completed and was otherwise negative except as listed in HPI.      Historical Information   Past Medical History:   Diagnosis Date    Metastatic non-small cell lung cancer (HCC)      Past Surgical History:   Procedure Laterality Date    IR BIOPSY BONE  1/23/2024    TONSILLECTOMY Bilateral      Family History   Problem Relation Age of Onset    Diabetes Mother     Heart disease Brother        Occupational History:    Social History: active smoker     Meds/Allergies   Current Facility-Administered Medications   Medication Dose Route Frequency    acetaminophen (TYLENOL) tablet 650 mg  650 mg Oral Q6H PRN    enoxaparin (LOVENOX) subcutaneous injection 40 mg  40 mg Subcutaneous Daily    HYDROmorphone (DILAUDID) tablet 2 mg  2 mg Oral Q6H    HYDROmorphone HCl (DILAUDID) injection 0.2 mg  0.2 mg Intravenous Q4H PRN    methylPREDNISolone sodium succinate (Solu-MEDROL) injection 40 mg  40 mg Intravenous Q8H SHANTAL    nicotine (NICODERM CQ) 7 mg/24hr TD 24 hr patch 1 patch  1 patch Transdermal Daily    oxyCODONE (ROXICODONE) IR tablet 5 mg  5 mg Oral Q6H PRN    oxyCODONE (ROXICODONE) split tablet 2.5 mg  2.5 mg Oral Q6H PRN     Medications Prior to Admission   Medication    HYDROmorphone HCl ER 8 MG TB24    oxyCODONE-acetaminophen (Percocet) 5-325 mg per tablet    albuterol (Proventil HFA) 90 mcg/act inhaler     No Known Allergies    Vitals: Blood pressure 140/84, pulse 95, temperature  97.5 °F (36.4 °C), temperature source Oral, resp. rate 16, weight 61.1 kg (134 lb 11.2 oz), SpO2 92%.,  Body mass index is 19.89 kg/m².      Intake/Output Summary (Last 24 hours) at 2/8/2024 1017  Last data filed at 2/8/2024 0831  Gross per 24 hour   Intake --   Output 800 ml   Net -800 ml       Physical Exam  General: Awake alert and oriented x 3, conversant without conversational dyspnea, NAD  HEENT:  PERRL, no nasal drainage, Mucous membranes, moist  NECK: Trachea midline, no accessory muscle use, no cervical or supraclavicular adenopathy, JVP not elevated  CARDIAC: Reg, s1/S2  PULM: Diminished at the right side, left side clear  CHEST: No gross deformities, equal chest expansion on inspiration bilaterally  ABD: Normoactive bowel sounds, soft nontender, nondistended, no rebound  EXT: No cyanosis, no clubbing, normal capillary refill, no edema   SKIN:  No rashes, no lesions  NEURO: no focal neurologic deficits, AAOx3    Labs: I have personally reviewed pertinent lab results.    Imaging and other studies: I have personally reviewed pertinent films in PACS    Pulmonary function testing:NA     EKG, Pathology, and Other Studies: I have personally reviewed pertinent reports.      Ruy Powell MD  Pulmonary & Critical Care Medicine

## 2024-02-09 PROBLEM — E43 SEVERE PROTEIN-CALORIE MALNUTRITION (HCC): Status: ACTIVE | Noted: 2024-02-09

## 2024-02-09 PROBLEM — J44.9 COPD (CHRONIC OBSTRUCTIVE PULMONARY DISEASE) (HCC): Status: ACTIVE | Noted: 2024-02-09

## 2024-02-09 NOTE — PLAN OF CARE
Problem: Potential for Falls  Goal: Patient will remain free of falls  Description: INTERVENTIONS:  - Educate patient/family on patient safety including physical limitations  - Instruct patient to call for assistance with activity   - Consult OT/PT to assist with strengthening/mobility   - Keep Call bell within reach  - Keep bed low and locked with side rails adjusted as appropriate  - Keep care items and personal belongings within reach  - Initiate and maintain comfort rounds  - Make Fall Risk Sign visible to staff  - Offer Toileting every  Hours, in advance of need  - Initiate/Maintain alarm  - Obtain necessary fall risk management equipment:   - Apply yellow socks and bracelet for high fall risk patients  - Consider moving patient to room near nurses station  Outcome: Progressing     Problem: PAIN - ADULT  Goal: Verbalizes/displays adequate comfort level or baseline comfort level  Description: Interventions:  - Encourage patient to monitor pain and request assistance  - Assess pain using appropriate pain scale  - Administer analgesics based on type and severity of pain and evaluate response  - Implement non-pharmacological measures as appropriate and evaluate response  - Consider cultural and social influences on pain and pain management  - Notify physician/advanced practitioner if interventions unsuccessful or patient reports new pain  Outcome: Progressing     Problem: INFECTION - ADULT  Goal: Absence or prevention of progression during hospitalization  Description: INTERVENTIONS:  - Assess and monitor for signs and symptoms of infection  - Monitor lab/diagnostic results  - Monitor all insertion sites, i.e. indwelling lines, tubes, and drains  - Monitor endotracheal if appropriate and nasal secretions for changes in amount and color  - [place] appropriate cooling/warming therapies per order  - Administer medications as ordered  - Instruct and encourage patient and family to use good hand hygiene technique  -  Identify and instruct in appropriate isolation precautions for identified infection/condition  Outcome: Progressing  Goal: Absence of fever/infection during neutropenic period  Description: INTERVENTIONS:  - Monitor WBC    Outcome: Progressing     Problem: SAFETY ADULT  Goal: Patient will remain free of falls  Description: INTERVENTIONS:  - Educate patient/family on patient safety including physical limitations  - Instruct patient to call for assistance with activity   - Consult OT/PT to assist with strengthening/mobility   - Keep Call bell within reach  - Keep bed low and locked with side rails adjusted as appropriate  - Keep care items and personal belongings within reach  - Initiate and maintain comfort rounds  - Make Fall Risk Sign visible to staff  - Offer Toileting every  Hours, in advance of need  - Initiate/Maintain alarm  - Obtain necessary fall risk management equipment:   - Apply yellow socks and bracelet for high fall risk patients  - Consider moving patient to room near nurses station  Outcome: Progressing  Goal: Maintain or return to baseline ADL function  Description: INTERVENTIONS:  -  Assess patient's ability to carry out ADLs; assess patient's baseline for ADL function and identify physical deficits which impact ability to perform ADLs (bathing, care of mouth/teeth, toileting, grooming, dressing, etc.)  - Assess/evaluate cause of self-care deficits   - Assess range of motion  - Assess patient's mobility; develop plan if impaired  - Assess patient's need for assistive devices and provide as appropriate  - Encourage maximum independence but intervene and supervise when necessary  - Involve family in performance of ADLs  - Assess for home care needs following discharge   - Consider OT consult to assist with ADL evaluation and planning for discharge  - Provide patient education as appropriate  Outcome: Progressing  Goal: Maintains/Returns to pre admission functional level  Description:  INTERVENTIONS:  - Perform AM-PAC 6 Click Basic Mobility/ Daily Activity assessment daily.  - Set and communicate daily mobility goal to care team and patient/family/caregiver.   - Collaborate with rehabilitation services on mobility goals if consulted  - Perform Range of Motion  times a day.  - Reposition patient every  hours.  - Dangle patient times a day  - Stand patient  times a day  - Ambulate patient  times a day  - Out of bed to chair  times a day   - Out of bed for meals  times a day  - Out of bed for toileting  - Record patient progress and toleration of activity level   Outcome: Progressing     Problem: DISCHARGE PLANNING  Goal: Discharge to home or other facility with appropriate resources  Description: INTERVENTIONS:  - Identify barriers to discharge w/patient and caregiver  - Arrange for needed discharge resources and transportation as appropriate  - Identify discharge learning needs (meds, wound care, etc.)  - Arrange for interpretive services to assist at discharge as needed  - Refer to Case Management Department for coordinating discharge planning if the patient needs post-hospital services based on physician/advanced practitioner order or complex needs related to functional status, cognitive ability, or social support system  Outcome: Progressing     Problem: Knowledge Deficit  Goal: Patient/family/caregiver demonstrates understanding of disease process, treatment plan, medications, and discharge instructions  Description: Complete learning assessment and assess knowledge base.  Interventions:  - Provide teaching at level of understanding  - Provide teaching via preferred learning methods  Outcome: Progressing     Problem: RESPIRATORY - ADULT  Goal: Achieves optimal ventilation and oxygenation  Description: INTERVENTIONS:  - Assess for changes in respiratory status  - Assess for changes in mentation and behavior  - Position to facilitate oxygenation and minimize respiratory effort  - Oxygen  administered by appropriate delivery if ordered  - Initiate smoking cessation education as indicated  - Encourage broncho-pulmonary hygiene including cough, deep breathe, Incentive Spirometry  - Assess the need for suctioning and aspirate as needed  - Assess and instruct to report SOB or any respiratory difficulty  - Respiratory Therapy support as indicated  Outcome: Progressing

## 2024-02-09 NOTE — OCCUPATIONAL THERAPY NOTE
Occupational Therapy Cancelled Session    Patient Name: Abdulaziz Gomez  Today's Date: 2/9/2024 02/09/24 0957   Note Type   Note type Cancelled Session   Cancel Reasons Other   Additional Comments OT orders received and chart review performed. Pt admitted w/ SOB and widespread pain. Per EMR, pt w/ widespread osseous metastases (innumerable bony lesions, including lesions in the cervical, thoracic, and lumbosacral spine, pelvis, and femur). Pt currently pending MRI Thoracic and lumbar spine to rule out cord compression. Will hold OT eval and await results of imaging.     Yasmin Matute, ELVA, OTR/L  [place] License [id number]  [place] License [id number]

## 2024-02-09 NOTE — ASSESSMENT & PLAN NOTE
PT  No documented PFTs on file  Pulm inputs noted  Continue respiratory treatments  Outpatient follow-up

## 2024-02-09 NOTE — CASE MANAGEMENT
Case Management Assessment & Discharge Planning Note    Patient name Abdulaziz Gomez  Location S /S -01 MRN [id number]  : 1960 Date 2024       Current Admission Date: 2024  Current Admission Diagnosis:Acute respiratory failure with hypoxia (HCC)   Patient Active Problem List    Diagnosis Date Noted    Adenocarcinoma of lung (HCC) 2024    Goals of care, counseling/discussion 2024    Cancer related pain 2024    Palliative care patient 2024    Ambulatory dysfunction 2024    Dysphagia 2024    Loss of appetite 2024    Weight loss 2024    Shortness of breath 2024    Pleural effusion on right 2024    Acute respiratory failure with hypoxia (HCC) 2024    Hyponatremia 2024    Metastatic non-small cell lung cancer (HCC) 2024    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)     Neck pain, chronic 2024    Chronic cough 2024    Current every day smoker 10/26/2023    Plantar fasciitis 2019    Pain in both feet 2019    Congenital pes planus of right foot 2019    Congenital pes planus of left foot 2019      LOS (days): 2  Geometric Mean LOS (GMLOS) (days): 3.6  Days to GMLOS:2     OBJECTIVE:    Risk of Unplanned Readmission Score: 12.76         Current admission status: Inpatient       Preferred Pharmacy:   [id number] - [place], [place] - [address] [address]  Phone: [phone] Fax: [phone]    Primary Care Provider: Genesis Leonard MD    Primary Insurance: BLUE CROSS  Secondary Insurance:     ASSESSMENT:  Active Health Care Proxies       Malgorzata Gomez Health Care Representative - Spouse   Primary Phone: [phone] (Mobile)                 Advance Directives  Does patient have a Health Care POA?: Yes  Does patient have Advance Directives?: Yes  Advance Directives: Power of  for health care, Power of  for finance, Living  will  Primary Contact: Spouse Malgorzata ([phone])              Patient Information  Admitted from:: Home  Mental Status: Alert  During Assessment patient was accompanied by: Spouse, Daughter  Assessment information provided by:: Patient, Spouse, Daughter  Primary Caregiver: Self  Support Systems: Self, Daughter, Spouse/significant other  What city do you [place] in[place]  Home entry access options. Select all that apply.: Stairs  Number of steps to enter home.: 2  Do the steps have railings?: Yes  Type of Current Residence: [facility]  Living Arrangements: Lives w/ Spouse/significant other  Is patient a ?: No    Activities of Daily Living Prior to Admission  Functional Status: Independent  Completes ADLs independently?: Yes  Ambulates independently?: Yes  Does patient use assisted devices?: No  Does patient currently own DME?: No  Does patient have a history of Outpatient Therapy (PT/OT)?: No  Does the patient have a history of Short-Term Rehab?: No  Does patient have a history of HHC?: No  Does patient currently have HHC?: No         Patient Information Continued  Income Source: Employed (Patient works FT (approx. 70 hours/week))  Does patient have prescription coverage?: Yes  Does patient have a history of substance abuse?: No  Does patient have a history of Mental Health Diagnosis?: No         Means of Transportation  Means of Transport to Appts:: Drives Self      Housing Stability: Unknown (2/9/2024)    Housing Stability Vital Sign     Unable to Pay for Housing in the Last Year: No     Number of Places Lived in the Last Year: Not on file     Unstable Housing in the Last Year: No   Food Insecurity: No Food Insecurity (2/9/2024)    Hunger Vital Sign     Worried About Running Out of Food in the Last Year: Never true     Ran Out of Food in the Last Year: Never true   Transportation Needs: No Transportation Needs (2/9/2024)    PRAPARE - Transportation     Lack of Transportation (Medical): No     Lack of  "Transportation (Non-Medical): No   Utilities: Not At Risk (2/9/2024)    [facility] Utilities     Threatened with loss of utilities: No       DISCHARGE DETAILS:    Discharge planning discussed with:: Patient and family (spouse and daughter) at bedside.  Freedom of Choice: Yes     CM contacted family/caregiver?: Yes  Were Treatment Team discharge recommendations reviewed with patient/caregiver?: Yes  Did patient/caregiver verbalize understanding of patient care needs?: Yes  Were patient/caregiver advised of the risks associated with not following Treatment Team discharge recommendations?: Yes    Contacts  Patient Contacts: Self, Abdulaziz Gomez  Contact Method: In Person  Reason/Outcome: Discharge Planning    Requested Home Health Care         Is the patient interested in HHC at discharge?: No    DME Referral Provided  Referral made for DME?: No              Treatment Team Recommendation: Home (pending PT/OT evals)  Discharge Destination Plan:: Home (pending PT/OT evals)                                      CM spoke with patient and family (spouse, daughter) at bedside to introduce role of CM and begin discharge planning. Patient resides with spouse in a 1L home with 2 NOY (w/ double-sided railings). Patient was entirely IPTA -- Denies use of DME for ambulation. 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 Patient is currently on O2, which he does not have at home. CM will follow for PT/OT and home O2 evals to determine discharge planning needs.    Of note, family expressed concern regarding family's future lack of income with patient's cancer dx. Patient is the sole breadwinner for the family. Family reports already being given information for MA and disability. They will be speaking with his HR rep about FMLA. As per  Christine ALFORD, patient's OSW and oncology financial counselors can be involved to assess if he would qualify for any additional resources on their end.      SABRINA " provided additional information for financial resources, such as the [facility] assistance office ([facility]), [employer] (assists with energy bills), and The Homeowner’s Emergency Mortgage Assistance Program (HEMAP). CM also provided contact information for the [facility] Financial Counselors.     CM will follow for further PT/OT recommendations, as well as RT evaluation for home O2. CM will place orders/arrange services as needed. As per Dr. Hernández, patient may discharge home pending pain control. The plan is for patient to begin OP chemotherapy as soon as possible.

## 2024-02-09 NOTE — ASSESSMENT & PLAN NOTE
Large right pleural effusion noted on CT chest  Status post thoracocentesis per pulmonary  Follow-up on cytology

## 2024-02-09 NOTE — PROGRESS NOTES
Progress Note - Palliative & Supportive Care  Abdulaziz Gomez  63 y.o.  male  S /S -01   MRN: [id number]  Encounter: [id number]     ASSESSMENT:  Adenocarcinoma of the lung, non-small cell lung cancer  Metastatic disease to Bone  Cancer-related pain - thoracolumbar spine, bilateral shoulders, left anterior thigh  Right Pleural Effusion  Weight Loss, Dysphagia  Palliative Care Patient  Goals of Care Discussion       PLAN:    1. Goals:   Full Code - Level 1  Palliative will follow for ongoing goals of care discussions as situation evolves.    2. Social Support:  Supportive listening provided  Normalized experience of patient/family  Provided anxiety containment    3. Symptom management:  Further adjustments to pain regimen due to 24 hour needs with several IV Dilaudid doses and frequent PRN PO dosing   Increases as below  Scheduled doses  PO Dilaudid 4 mg q8 hours ATC    PRN doses  PO Dilaudid 4 mg q4 hours PRN for moderate pain  PO dilaudid 6 mg q4 hours PRN for severe pain  IV Dilaudid 0.2 mg q4 hours PRN for BTP    All the above with Hold Parameters for Safety  Narcan on order for concerns for respiratory depression or need for opioid reversal    Multi-modal analgesia  Start APAP 975 mg q8 hours ATC  Offered Robaxin as a potential option but patient would like to hold off on muscle relaxer at this time    Constipation - continue bowel regimen  Passing flatus but no bowel movement yet  Increase Senokot to 2 tabs BID.  Continue Miralax Daily    4. Follow-up  We appreciate the opportunity to participate in this patient's care.   We will continue to follow. PSC to follow up in person on 2/12/2024.  Please do not hesitate to contact our on-call provider through our clinic answering service at [phone] should there be an acute change or other symptom control concerns.    Code status: Level 1 - Full Code   Decisional apparatus:  Patient does have capacity to make medical decisions on my exam today. If such  capacity is lost, patient's substitute decision maker would default to wife by [place] Act [place].   Advance Directive / Living Will / POLST:  Yes - patient designates daughterSoniya as substitute medical decision maker    We appreciate the opportunity to participate in this patient's care. We will continue to follow. Please do not hesitate to contact our on-call provider through our clinic answering service at [phone] should you have acute symptom control concerns.    Subjective:  Patient seen and examined with no acute distress.  Wife and daughter bedside accompanying patient.  Patient states his pain is controlled this morning and has been stable for the past 3 hours. He does endorse severe pain yesterday afternoon and evening which he has needed frequent dosing of IV and PO PRN opioids to help manage his pain. He feels his pain can be intolerable still at times.  He does endorse exacerbation of his pain when needing to move around too much or having to exert with talking for too long.   He is passing flatus but has not had a bowel movement yet.  Denies any stomach pain at this time.    Discussed options to help with further pain management and he was agreeable to an increase of dosing.    Review of Systems   Constitutional:  Positive for fatigue. Negative for activity change, appetite change, chills and fever.   HENT:  Negative for trouble swallowing.    Eyes:  Negative for visual disturbance.   Respiratory:  Positive for shortness of breath (with conversation or movement, but improved overall compared to prior to admission). Negative for cough.    Cardiovascular:  Negative for chest pain.   Gastrointestinal:  Positive for constipation. Negative for abdominal pain, diarrhea, nausea and vomiting.   Genitourinary:  Negative for difficulty urinating and dysuria.   Musculoskeletal:  Positive for back pain and neck stiffness. Negative for arthralgias.   Neurological:  Negative for light-headedness and  headaches.   Psychiatric/Behavioral:  Negative for confusion and sleep disturbance.    All other systems reviewed and are negative.      MEDICATIONS / ALLERGIES:  all current active meds have been reviewed, current meds:   Current Facility-Administered Medications   Medication Dose Route Frequency    acetaminophen (TYLENOL) tablet 975 mg  975 mg Oral Q8H [facility]    bisacodyl (DULCOLAX) rectal suppository 10 mg  10 mg Rectal Daily PRN    enoxaparin (LOVENOX) subcutaneous injection 40 mg  40 mg Subcutaneous Daily    HYDROmorphone (DILAUDID) tablet 4 mg  4 mg Oral Q8H SHANTAL    HYDROmorphone (DILAUDID) tablet 4 mg  4 mg Oral Q4H PRN    Or    HYDROmorphone (DILAUDID) tablet 6 mg  6 mg Oral Q4H PRN    HYDROmorphone HCl (DILAUDID) injection 0.2 mg  0.2 mg Intravenous Q4H PRN    methylPREDNISolone sodium succinate (Solu-MEDROL) injection 40 mg  40 mg Intravenous Q12H SHANTAL    naloxone (NARCAN) 0.04 mg/mL syringe 0.04 mg  0.04 mg Intravenous Q1MIN PRN    nicotine (NICODERM CQ) 7 mg/24hr TD 24 hr patch 1 patch  1 patch Transdermal Daily    polyethylene glycol (MIRALAX) packet 17 g  17 g Oral Daily PRN    polyethylene glycol (MIRALAX) packet 17 g  17 g Oral Daily    senna-docusate sodium (SENOKOT S) 8.6-50 mg per tablet 2 tablet  2 tablet Oral BID   , and PTA meds:   Prior to Admission Medications   Prescriptions Last Dose Informant Patient Reported? Taking?   HYDROmorphone HCl ER 8 MG TB24 2/7/2024  No Yes   Sig: Take 1 tablet by mouth in the morning Max Daily Amount: 1 tablet   albuterol (Proventil HFA) 90 mcg/act inhaler Not Taking Self No No   Sig: Inhale 2 puffs every 6 (six) hours as needed for wheezing   Patient not taking: Reported on 1/3/2024   oxyCODONE-acetaminophen (Percocet) 5-325 mg per tablet 2/6/2024  No Yes   Sig: Take 1 tablet by mouth every 6 (six) hours as needed for moderate pain Max Daily Amount: 4 tablets      Facility-Administered Medications: None       No Known Allergies    OBJECTIVE:  /81   Pulse (!)  107   Temp 97.7 °F (36.5 °C)   Resp 20   Wt 61.1 kg (134 lb 11.2 oz)   SpO2 90%   BMI 19.89 kg/m²   Nursing notes reviewed.  Physical Exam  Vitals and nursing note reviewed.   Constitutional:       General: He is not in acute distress.     Appearance: He is ill-appearing. He is not toxic-appearing or diaphoretic.      Interventions: He is not intubated.  HENT:      Head: Normocephalic and atraumatic.   Eyes:      Conjunctiva/sclera: Conjunctivae normal.   Cardiovascular:      Rate and Rhythm: Normal rate and regular rhythm.   Pulmonary:      Effort: Pulmonary effort is normal. No tachypnea, bradypnea, accessory muscle usage or respiratory distress. He is not intubated.      Breath sounds: No stridor.   Chest:      Chest wall: No edema.   Abdominal:      General: Bowel sounds are normal.      Palpations: Abdomen is soft.      Tenderness: There is no abdominal tenderness. There is no guarding or rebound.      Comments: Active bowel sounds in all 4 quadrants. No high pitch/tinkering on auscultation.   Musculoskeletal:         General: No swelling.      Right lower leg: No edema.      Left lower leg: No edema.   Skin:     General: Skin is warm and dry.      Coloration: Skin is not cyanotic or pale.   Neurological:      General: No focal deficit present.      Mental Status: He is alert and oriented to person, place, and time.   Psychiatric:         Mood and Affect: Mood normal. Mood is not anxious.         Behavior: Behavior normal. Behavior is not agitated.         Lab Results: I have personally reviewed pertinent labs.    HEMATOLOGY PROFILE:  Results from last 7 days   Lab Units 02/08/24  0532 02/07/24  1802   WBC Thousand/uL  --  8.23   HEMOGLOBIN g/dL  --  12.2   HEMATOCRIT %  --  34.8*   PLATELETS Thousands/uL 240 219   NEUTROS PCT %  --  75   MONOS PCT %  --  9   EOS PCT %  --  0       CHEMISTRY PROFILE:  Results from last 7 days   Lab Units 02/09/24  0533 02/07/24  1802   SODIUM mmol/L 131* 129*   POTASSIUM  "mmol/L 4.6 4.1   CHLORIDE mmol/L 94* 90*   CO2 mmol/L 28 28   BUN mg/dL 19 12   CREATININE mg/dL 0.62 0.62   ALBUMIN g/dL  --  3.8   CALCIUM mg/dL 9.4 9.5   ALK PHOS U/L  --  84   ALT U/L  --  16   AST U/L  --  25       Albumin:  0   Lab Value Date/Time    ALB 3.8 02/07/2024 1802     Imaging Studies: I have personally reviewed pertinent reports.  EKG, Pathology, and Other Studies: I have personally reviewed pertinent reports.    Counseling / Coordination of Care:  Total floor / unit time spent today 45 minutes. Greater than 50% of total time was spent with the patient and / or family counseling and / or coordination of care. A description of the counseling / coordination of care: symptom assessment and management, medication review, medication adjustment, psychosocial support, chart review, imaging review, lab review, opioid titration, supportive listening, and anticipatory guidance.    Jb Maynard DO  [facility]iative [facility] Supportive [facility]  [phone]    Portions of this document may have been created using dictation software and as such some \"sound alike\" terms may have been generated by the system. Do not hesitate to contact me with any questions or clarifications.    "

## 2024-02-09 NOTE — ASSESSMENT & PLAN NOTE
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  CTA PE study : \"Redemonstration of tumor encasing the distal trachea and right bronchi extending into the anterior mediastinum and lower neck bilaterally encasing and narrowing of the brachiocephalic veins and SVC and occlusion of the right bronchi. Mild bilateral axillary lymphadenopathy, possibly metastatic. Liver metastases better shown on prior study.\"  Radiation oncology plans to initiate radiotherapy noted  Oncology plans for imaging and close outpatient follow-up for initiating chemotherapy noted  Analgesics  Supportive cares  "

## 2024-02-09 NOTE — PROGRESS NOTES
Progress Note - Pulmonary   Abdulaziz Gomez 63 y.o. male MRN: [id number]  Unit/Bed#: S -01 Encouter:[id number]    Assessment/Plan:    R sided pleural effusion  S/P toracentesis with about 1 L of fluid removal. Labs showing positive Light's Criteria  Pending cytology. Patient may require palliative pleural catheter if cytology positive and fast fluid re-accumulation.  There is some fluid left after thoracentesis. Will do another chest x ray tomorrow for fluid re-assessment.  Hemoptysis               Patient noted blood in the sputum on 3 occasions. NO salma blood, no clots. Blood mixed with sputum brown to bright red.Concern for mass invasion in to the airway tissue .Patient also after thoracentesis.  Will repeat x ray  tomorrow.  Monitor CBC  Stage IV adenocarcinoma of the lung  Recently diagnosed. According to CT destructive mass centered in the right upper anterior mediastinum with involvement of the sternum/manumbrian and upper mediastinal vessels . Proved by IR biopsy to be metastatic NSCLC  S/P PET scan showing extensive  lymphadenopathy in the neck, chest abdomen and pelvis suspicious for metastasis, osseous and hepatic lesions  compatible with osseous and liver metastasis.  Palliative team following. Prefers to be full code for now.  Radiation and Medical oncology following recommending MRI cervical, thoracic, and lumbar spine  Per oncology team patient needs treatment with Alimta/carboplatin as soon as possible and then pembrolizumab as an outpatient along with chemotherapy   Acute respiratory failure with hypoxia  On presentation with shortness of breath and hypoxia requiring O2 supplementation.  Considered to be in the setting of current malignancy given CT description of tumor encasing the trachea and R bronchi as well as large pleural effusion.   Also suspected underlying pneumonia given ground glass opacity on CT. Holding off on AB.  Patient is suspected to also have undiagnosed COPD however is  not in acute exacerbation.CTA on admission ruled out PE  Patient requires systemic chemo treatment as soon as possible per oncology team.  Active tobacco use  Currently smoking 3-4 cigarettes a day  On NRT 7 mg  Unclassified COPD  Nebulizers were offered however patient refused.  No PFT's on file    Chief Complaint:    Less dyspnea.    Subjective:    Patient resting in bed. Has been on 5 L O2 but reports that was increased while he went done for radiotherapy simulation. Reports havinh 3 epsisode of hemoptysis 2 happened yesterday and one this morning. No salma blood no clots. Noted sputum mixed with blood.     Objective:    Vitals: Blood pressure 130/81, pulse (!) 107, temperature 97.7 °F (36.5 °C), resp. rate 20, weight 61.1 kg (134 lb 11.2 oz), SpO2 90%.,Body mass index is 19.89 kg/m².      Intake/Output Summary (Last 24 hours) at 2/9/2024 0923  Last data filed at 2/8/2024 1737  Gross per 24 hour   Intake --   Output 400 ml   Net -400 ml       Invasive Devices       Peripheral Intravenous Line  Duration             Peripheral IV 02/07/24 Right Antecubital 1 day                      Physical Exam  Vitals and nursing note reviewed.   Constitutional:       General: He is not in acute distress.     Appearance: He is cachectic. He is ill-appearing.   HENT:      Head: Normocephalic and atraumatic.      Nose: Nose normal. No congestion or rhinorrhea.   Eyes:      General: No scleral icterus.        Right eye: No discharge.         Left eye: No discharge.      Extraocular Movements: Extraocular movements intact.   Cardiovascular:      Rate and Rhythm: Normal rate and regular rhythm.      Pulses: Normal pulses.      Heart sounds: Normal heart sounds. No murmur heard.  Pulmonary:      Breath sounds: No wheezing, rhonchi or rales.      Comments: Tachypnic. Decreased breathing sounds on the R  Abdominal:      General: Bowel sounds are normal. There is no distension.      Palpations: Abdomen is soft.      Tenderness: There is  "no abdominal tenderness. There is no right CVA tenderness, left CVA tenderness or rebound.   Musculoskeletal:         General: Normal range of motion.      Cervical back: Normal range of motion.      Right lower leg: No edema.      Left lower leg: No edema.   Skin:     General: Skin is warm and dry.      Coloration: Skin is not jaundiced or pale.   Neurological:      General: No focal deficit present.      Mental Status: He is alert and oriented to person, place, and time.      Motor: No weakness.      Gait: Gait normal.   Psychiatric:         Mood and Affect: Mood normal.         Behavior: Behavior normal.         Thought Content: Thought content normal.         Judgment: Judgment normal.         Labs: I have personally reviewed pertinent lab results., ABG: No results found for: \"PHART\", \"KKJ9QTR\", \"PO2ART\", \"FPP6JAZ\", \"Y2EQOHSG\", \"BEART\", \"SOURCE\", BNP: No results found for: \"BNP\", CBC: No results found for: \"WBC\", \"HGB\", \"HCT\", \"MCV\", \"PLT\", \"ADJUSTEDWBC\", \"RBC\", \"MCH\", \"MCHC\", \"RDW\", \"MPV\", \"NRBC\", CMP:   Lab Results   Component Value Date    SODIUM 131 (L) 02/09/2024    K 4.6 02/09/2024    CL 94 (L) 02/09/2024    CO2 28 02/09/2024    BUN 19 02/09/2024    CREATININE 0.62 02/09/2024    CALCIUM 9.4 02/09/2024    EGFR 105 02/09/2024         Imaging and other studies:    "

## 2024-02-09 NOTE — SOCIAL WORK
Palliative LSW saw patient at the bedside today. LSW appreciates the opportunity to provide patient/family with inpatient emotional support and guidance while patient continues to receive medical attention from the medical team.     Topics discussed: Met with pt, pt's wife, and dtr Soniya at bedside for continued support. Pt/family feel his breathing is better today. Pt was also able to have his radiation mapping completed. He is currently pending MRI for his spine. Pt/family remain wishing to pursue CA-directed tx.     Soniya discussed she has reached out to pt's HR rep to further discuss FMLA and other benefit options for pt. She also has begun process to apply for SSD and will be looking into applying for MA for pt. Family has also already received ewa care application through [facility] which pt/family will complete and submit. Encouraged Soniay to reach out to LSW with any further questions/concerns.     Time spent allowing pt's wife to process her feelings about pt's dx and emotional support was provided.     -----  Case reviewed with OSW to provide update of pt/family's future financial concerns and resources that have since been provided to pt/family.       Areas that need follow-up: Emotional Support  Resources given: None  Others present: Pt's wife and dtr      I have spent 45 minutes with Patient and family today in which greater than 50% of this time was spent in counseling/coordination of care regarding Counseling / Coordination of care; Communication with Other Healthcare Professionals.     LSW will continue to follow as requested by the medical team, patient, or family

## 2024-02-09 NOTE — ASSESSMENT & PLAN NOTE
Acute hypoxic respiratory failure  Multifactorial  Patient noted to have right pleural effusion, emphysematous lungs, lung cancer encasing distal trachea right bronchi, SVC  Continue supplemental oxygen wean as tolerated to keep O2 sats more than 90 to 92%  Monitor ambulatory O2 sats  Encourage incentive spirometry  Supportive cares

## 2024-02-09 NOTE — PHYSICAL THERAPY NOTE
Physical Therapy Cancellation Note     02/09/24 1001   Note Type   Note type Cancelled Session   Cancel Reasons Other   Additional Comments referral received for PT eval and tx. pt is pending MRI of thoracic and lumbar spine to rule out cord compression. will await imaging completion and perform eval as appropriate.     Cristobal Ponce, PT

## 2024-02-09 NOTE — PROGRESS NOTES
"Formerly [facility]  Progress Note  Name: Abdulaziz Gomez I  MRN: [id number]  Unit/Bed#: S -01 I Date of Admission: 2/7/2024   Date of Service: 2/9/2024 I Hospital Day: 2    Assessment/Plan   * Acute respiratory failure with hypoxia (HCC)  Assessment & Plan  Acute hypoxic respiratory failure  Multifactorial  Patient noted to have right pleural effusion, emphysematous lungs, lung cancer encasing distal trachea right bronchi, SVC  Continue supplemental oxygen wean as tolerated to keep O2 sats more than 90 to 92%  Monitor ambulatory O2 sats  Encourage incentive spirometry  Supportive cares      Metastatic non-small cell lung cancer (HCC)  Assessment & Plan  Patient recently diagnosed with 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  CTA PE study : \"Redemonstration of tumor encasing the distal trachea and right bronchi extending into the anterior mediastinum and lower neck bilaterally encasing and narrowing of the brachiocephalic veins and SVC and occlusion of the right bronchi. Mild bilateral axillary lymphadenopathy, possibly metastatic. Liver metastases better shown on prior study.\"  Radiation oncology plans to initiate radiotherapy noted  Oncology plans for imaging and close outpatient follow-up for initiating chemotherapy noted  Analgesics  Supportive cares    Severe protein-calorie malnutrition (HCC)  Assessment & Plan       Body mass index is 19.89 kg/m².   Encourage protein and calorie intake    COPD (chronic obstructive pulmonary disease) (HCC)  Assessment & Plan  PT  No documented PFTs on file  Pulm inputs noted  Continue respiratory treatments  Outpatient follow-up    Hyponatremia  Assessment & Plan  Multifactorial  Monitor    Pleural effusion on right  Assessment & Plan  Large right pleural effusion noted on CT chest  Status post thoracocentesis per pulmonary  Follow-up on cytology      Cancer related pain  Assessment & Plan  Continue opioid analgesics per " palliative care  Bowel regimen  Palliative inputs noted    Current every day smoker  Assessment & Plan  Tobacco cessation discussed and strongly encouraged                   VTE Pharmacologic Prophylaxis: VTE Score: 6 High Risk (Score >/= 5) - Pharmacological DVT Prophylaxis Ordered: enoxaparin (Lovenox). Sequential Compression Devices Ordered.    Mobility:   Basic Mobility Inpatient Raw Score: 17  JH-HLM Goal: 5: Stand one or more mins  JH-HLM Achieved: 6: Walk 10 steps or more  HLM Goal NOT achieved. Continue with multidisciplinary rounding and encourage appropriate mobility to improve upon HLM goals.    Patient Centered Rounds: I performed bedside rounds with nursing staff today.   Discussions with Specialists or Other Care Team Provider: Medical oncology, radiation oncology, physical therapy, case management    Education and Discussions with Family / Patient:  Discussed with the patient, updated daughter Soniya in detail questions answered.     Total Time Spent on Date of Encounter in care of patient: 32 mins. This time was spent on one or more of the following: performing physical exam; counseling and coordination of care; obtaining or reviewing history; documenting in the medical record; reviewing/ordering tests, medications or procedures; communicating with other healthcare professionals and discussing with patient's family/caregivers.    Current Length of Stay: 2 day(s)  Current Patient Status: Inpatient   Certification Statement: The patient will continue to require additional inpatient hospital stay due to as outlined  Discharge Plan:  Ongoing workup with radiation oncology, medical oncology as outlined    Code Status: Level 1 - Full Code    Subjective:     Reports feeling weak and tired  Appetite fair  History chart labs medications reviewed  He reports hemoptysis  Encouraged incentive spirometry    Objective:     Vitals:   Temp (24hrs), Av.6 °F (36.4 °C), Min:97.4 °F (36.3 °C), Max:97.7 °F (36.5  °C)    Temp:  [97.4 °F (36.3 °C)-97.7 °F (36.5 °C)] 97.7 °F (36.5 °C)  HR:  [] 107  Resp:  [20] 20  BP: (130-146)/(72-88) 130/81  SpO2:  [87 %-92 %] 90 %  Body mass index is 19.89 kg/m².     Input and Output Summary (last 24 hours):     Intake/Output Summary (Last 24 hours) at 2/9/2024 1428  Last data filed at 2/8/2024 1737  Gross per 24 hour   Intake --   Output 200 ml   Net -200 ml       Physical Exam:   Physical Exam     Comfortably in bed  Hoarseness of voice noted  Features of protein calorie malnutrition noted  Neck supple  Lungs emphysematous  Diminished breath sounds bilateral  Heart sounds S1-S2 noted  Abdomen soft  Awake, follows commands  Moves extremities  No pedal edema  No rash    Additional Data:     Labs:  Results from last 7 days   Lab Units 02/08/24  0532 02/07/24  1802   WBC Thousand/uL  --  8.23   HEMOGLOBIN g/dL  --  12.2   HEMATOCRIT %  --  34.8*   PLATELETS Thousands/uL 240 219   NEUTROS PCT %  --  75   LYMPHS PCT %  --  14   MONOS PCT %  --  9   EOS PCT %  --  0     Results from last 7 days   Lab Units 02/09/24  0533 02/07/24  1802   SODIUM mmol/L 131* 129*   POTASSIUM mmol/L 4.6 4.1   CHLORIDE mmol/L 94* 90*   CO2 mmol/L 28 28   BUN mg/dL 19 12   CREATININE mg/dL 0.62 0.62   ANION GAP mmol/L 9 11   CALCIUM mg/dL 9.4 9.5   ALBUMIN g/dL  --  3.8   TOTAL BILIRUBIN mg/dL  --  0.91   ALK PHOS U/L  --  84   ALT U/L  --  16   AST U/L  --  25   GLUCOSE RANDOM mg/dL 142* 107     Results from last 7 days   Lab Units 02/07/24  1802   INR  1.05     Results from last 7 days   Lab Units 02/07/24  0752   POC GLUCOSE mg/dl 83               Lines/Drains:  Invasive Devices       Peripheral Intravenous Line  Duration             Peripheral IV 02/07/24 Right Antecubital 1 day                          Imaging: Reviewed radiology reports from this admission including: chest CT scan and procedure reports    Recent Cultures (last 7 days):   Results from last 7 days   Lab Units 02/08/24  0937   GRAM STAIN  RESULT  2+ Polys  No bacteria seen   BODY FLUID CULTURE, STERILE  No growth       Last 24 Hours Medication List:   Current Facility-Administered Medications   Medication Dose Route Frequency Provider Last Rate    acetaminophen  975 mg Oral Q8H SHANTAL Jb Shiu, DO      bisacodyl  10 mg Rectal Daily PRN Violeta Ruiz MD      enoxaparin  40 mg Subcutaneous Daily Yael Manoj, JARADNP      HYDROmorphone  4 mg Oral Q8H SHANTAL Jb Shiu, DO      HYDROmorphone  4 mg Oral Q4H PRN Jb Shiu, DO      Or    HYDROmorphone  6 mg Oral Q4H PRN Jb Shiu, DO      HYDROmorphone  0.2 mg Intravenous Q4H PRN MARY Sampson      ipratropium-albuterol  3 mL Nebulization Q6H Guera Hernández MD      methylPREDNISolone sodium succinate  40 mg Intravenous Q12H [facility] Violeta Ruiz MD      naloxone  0.04 mg Intravenous Q1MIN PRN Jb Shiu, DO      polyethylene glycol  17 g Oral Daily PRN Jb Shiu, DO      polyethylene glycol  17 g Oral Daily Violeta Ruiz MD      senna-docusate sodium  2 tablet Oral BID Jb Shiu, DO          Today, Patient Was Seen By: Guera Hernández MD    **Please Note: This note may have been constructed using a voice recognition system.**

## 2024-02-10 NOTE — ASSESSMENT & PLAN NOTE
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  CTA PE study : \"Redemonstration of tumor encasing the distal trachea and right bronchi extending into the anterior mediastinum and lower neck bilaterally encasing and narrowing of the brachiocephalic veins and SVC and occlusion of the right bronchi. Mild bilateral axillary lymphadenopathy, possibly metastatic. Liver metastases better shown on prior study.\"  Radiation oncology plans to initiate radiotherapy noted  Oncology plans for imaging and close outpatient follow-up for initiating chemotherapy noted  Analgesics, supportive cares  Outpatient follow-up with oncology  "

## 2024-02-10 NOTE — QUICK NOTE
Paged by patient's nurse regarding patients uncontrolled pain.  Family at bedside and I called to speak with patient.    Patient had bad pain last evening after having to lie still from MRI.  Pain is rated a 20/10 at this time.  No concerns for feeling sleepy/tired or confused. Patient unable to sleep last night due to pain.    Discussed further increase of PO dilaudid as below   -PO Dilaudid 6mg q8 hours ATC   -PO Dilaudid 6mg q4 hours PRN for moderate pain   -PO Dilaudid 8mg q4 hours PRN for severe pain    Increase IV Dilaudid dose to 0.5mg q4 hours PRN for BTP.    All with hold parameters.    Discussed with patient's RN via TT of plan.    Jb Maynard, DO  Palliative Care

## 2024-02-10 NOTE — PROGRESS NOTES
Progress Note - Pulmonary   Abdulaziz Gomez 63 y.o. male MRN: [id number]  Unit/Bed#: S -01 Encounter: [id number]    Assessment/Plan:    Acute hypoxic respiratory failure, T factorial due to listed below  -90% on 6 L  -Continue to maintain saturation greater than 89%  -Pulmonary hygiene encouraged: Deep breathing with cough, OOB as tolerated, incentive spirometry and flutter valve as tolerated    Right-sided pleural effusion  -Thoracentesis completed 02/08/2024, 1 L clear yellow fluid removed, unable to remove all fluid secondary to discomfort by patient  -Cytology pending, suspected malignant fluid  -Chest x-ray shows reaccumulation of fluid, will recommend IPC for possibly Monday    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 FDG avid intrathoracic disease most extensive at the superior mediastinum and right perihilar region concerning for malignancy.  2. Extensive FDG avid peggy disease in the lower neck, chest and abdomen suspicious for metastasis.  3. Scattered FDG-avid hepatic lesions concerning for metastasis.  4. Innumerable FDG avid osseous lesions compatible with widespread osseous metastasis.  5. A few small foci of FDG uptake along the left anterior thigh musculature for which intramuscular metastasis is not excluded.  -Medical oncology and radiation oncology following, radiation and chemo recommended to start a soon as possible  -Radiation simulation completed 2/9/2024  -Recommended treatment Alimta/carboplatin as soon as possible and then pembrolizumab as an outpatient along with chemotherapy     Hemoptysis  -CTA chest PE study showed no pulmonary embolism  -Likely secondary to tumor burden  -Will continue to follow CBC    Suspected COPD of unknown severity  -Patient has had no formal PFTs  -Extensive history of smoking, currently still smokes 3 to 4 cigarettes a day  -Nebulizers offered but patient refused    Chief Complaint:    I am having a hard  time controlling my pain today    Subjective:    Patient seen and examined at bedside.  Laying in bed.  Endorses continued pain.  Dilaudid 0.2 mg just given.  Patient denies any overnight events.      Objective:    Vitals: Blood pressure 146/77, pulse (!) 109, temperature 97.5 °F (36.4 °C), resp. rate 16, weight 61.1 kg (134 lb 11.2 oz), SpO2 (!) 89%.6 L,Body mass index is 19.89 kg/m².      Intake/Output Summary (Last 24 hours) at 2/10/2024 0916  Last data filed at 2/10/2024 0855  Gross per 24 hour   Intake --   Output 550 ml   Net -550 ml       Invasive Devices       Peripheral Intravenous Line  Duration             Peripheral IV 02/10/24 Distal;Right;Upper;Ventral (anterior) Arm <1 day                    Physical Exam:     Physical Exam  Vitals reviewed.   Constitutional:       General: He is not in acute distress.     Appearance: He is ill-appearing.   HENT:      Head: Normocephalic and atraumatic.      Right Ear: External ear normal.      Left Ear: External ear normal.      Nose: Nose normal.      Mouth/Throat:      Mouth: Mucous membranes are moist.      Pharynx: Oropharynx is clear.   Eyes:      Extraocular Movements: Extraocular movements intact.      Pupils: Pupils are equal, round, and reactive to light.   Cardiovascular:      Rate and Rhythm: Normal rate.      Pulses: Normal pulses.      Heart sounds: Normal heart sounds.   Pulmonary:      Effort: Pulmonary effort is normal. No respiratory distress.      Breath sounds: No rhonchi.      Comments: Diminished breath sounds bilaterally  Abdominal:      General: Bowel sounds are normal.      Palpations: Abdomen is soft.   Musculoskeletal:         General: Normal range of motion.      Cervical back: Normal range of motion and neck supple.      Right lower leg: No edema.      Left lower leg: No edema.   Skin:     General: Skin is warm and dry.   Neurological:      Mental Status: He is alert and oriented to person, place, and time.   Psychiatric:         Mood and  "Affect: Mood normal.         Behavior: Behavior normal.         Thought Content: Thought content normal.         Judgment: Judgment normal.       Labs: I have personally reviewed pertinent lab results., ABG: No results found for: \"PHART\", \"OSF6NYB\", \"PO2ART\", \"WHL4LLN\", \"F2VMOYQQ\", \"BEART\", \"SOURCE\", BNP: No results found for: \"BNP\", CBC: No results found for: \"WBC\", \"HGB\", \"HCT\", \"MCV\", \"PLT\", \"ADJUSTEDWBC\", \"RBC\", \"MCH\", \"MCHC\", \"RDW\", \"MPV\", \"NRBC\", CMP: No results found for: \"SODIUM\", \"K\", \"CL\", \"CO2\", \"ANIONGAP\", \"BUN\", \"CREATININE\", \"GLUCOSE\", \"CALCIUM\", \"AST\", \"ALT\", \"ALKPHOS\", \"PROT\", \"BILITOT\", \"EGFR\"        Imaging and other studies: I have personally reviewed pertinent reports.    "

## 2024-02-10 NOTE — OCCUPATIONAL THERAPY NOTE
Occupational Therapy Cancellation Note        Patient Name: Abdulaziz Gomez  Today's Date: 2/10/2024         02/10/24 2992   Note Type   Note type Cancelled Session   Cancel Reasons Other   Additional Comments OT orders previously recieved, chart review completed. RN and pt's family requesting to allow pt to sleep at this time as he was unable to sleep last night. Will hold OT evaluation at this time and see as pt is awakre/appropriate. Will continue to follow     Morena Jacksno MS, OTR/L

## 2024-02-10 NOTE — PROGRESS NOTES
"[facility]  Progress Note  Name: Abdulaziz Gomez I  MRN: [id number]  Unit/Bed#: S -01 I Date of Admission: 2/7/2024   Date of Service: 2/10/2024 I Hospital Day: 3    Assessment/Plan   * Acute respiratory failure with hypoxia (HCC)  Assessment & Plan  Acute hypoxic respiratory failure  Multifactorial  Patient noted to have right pleural effusion, emphysematous lungs, lung cancer encasing distal trachea right bronchi, SVC  Continue supplemental oxygen wean as tolerated to keep O2 sats more than 90 to 92%  Monitor ambulatory O2 sats  Encourage incentive spirometry  Supportive cares      Metastatic non-small cell lung cancer (HCC)  Assessment & Plan  Patient recently diagnosed with 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  CTA PE study : \"Redemonstration of tumor encasing the distal trachea and right bronchi extending into the anterior mediastinum and lower neck bilaterally encasing and narrowing of the brachiocephalic veins and SVC and occlusion of the right bronchi. Mild bilateral axillary lymphadenopathy, possibly metastatic. Liver metastases better shown on prior study.\"  Radiation oncology plans to initiate radiotherapy noted  Oncology plans for imaging and close outpatient follow-up for initiating chemotherapy noted  Analgesics, supportive cares  Outpatient follow-up with oncology    Severe protein-calorie malnutrition (HCC)  Assessment & Plan       Body mass index is 19.89 kg/m².   Encourage protein and calorie intake    COPD (chronic obstructive pulmonary disease) (HCC)  Assessment & Plan  No documented PFTs on file  Continue respiratory treatments  Pulm inputs noted  Supportive cares    Hyponatremia  Assessment & Plan  Multifactorial  Monitor    Pleural effusion on right  Assessment & Plan  Large right pleural effusion noted on CT chest  Status post thoracocentesis per pulmonary  Follow-up on cytology    Cancer related pain  Assessment & Plan  Imaging " studies show extensive metastatic disease including bones  Opiate analgesics with palliative care  Supportive cares  Palliative care inputs noted    Current every day smoker  Assessment & Plan  Tobacco cessation discussed and strongly encouraged               VTE Pharmacologic Prophylaxis: VTE Score: 6 High Risk (Score >/= 5) - Pharmacological DVT Prophylaxis Ordered: enoxaparin (Lovenox). Sequential Compression Devices Ordered.    Mobility:   Basic Mobility Inpatient Raw Score: 17  JH-HLM Goal: 5: Stand one or more mins  JH-HLM Achieved: 6: Walk 10 steps or more  HLM Goal NOT achieved. Continue with multidisciplinary rounding and encourage appropriate mobility to improve upon HLM goals.    Patient Centered Rounds: I performed bedside rounds with nursing staff today.   Discussions with Specialists or Other Care Team Provider: Case management    Education and Discussions with Family / Patient:  Patient, updated daughter Soniya in detail, questions answered.     Total Time Spent on Date of Encounter in care of patient: 32 mins. This time was spent on one or more of the following: performing physical exam; counseling and coordination of care; obtaining or reviewing history; documenting in the medical record; reviewing/ordering tests, medications or procedures; communicating with other healthcare professionals and discussing with patient's family/caregivers.    Current Length of Stay: 3 day(s)  Current Patient Status: Inpatient   Certification Statement: The patient will continue to require additional inpatient hospital stay due to as outlined  Discharge Plan:  Awaiting clinical symptomatic improvement, pain control, physical therapy evaluation disposition planning case management following    Code Status: Level 1 - Full Code    Subjective:   Comfortably in bed  Reports aches and pains  Reports constipation agreeable to Dulcolax suppository  Discussed with RN  Encourage incentive spirometry      Objective:     Vitals:    Temp (24hrs), Av.4 °F (36.3 °C), Min:97.3 °F (36.3 °C), Max:97.5 °F (36.4 °C)    Temp:  [97.3 °F (36.3 °C)-97.5 °F (36.4 °C)] 97.5 °F (36.4 °C)  HR:  [] 109  Resp:  [16-19] 16  BP: (125-146)/(74-83) 146/77  SpO2:  [88 %-97 %] 89 %  Body mass index is 19.89 kg/m².     Input and Output Summary (last 24 hours):     Intake/Output Summary (Last 24 hours) at 2/10/2024 1342  Last data filed at 2/10/2024 0855  Gross per 24 hour   Intake --   Output 550 ml   Net -550 ml       Physical Exam:   Physical Exam     Comfortably in bed  Features of protein calorie malnutrition noted  Neck supple  Lungs emphysematous  Breath sounds are diminished  Bilateral scattered rhonchi noted  Heart sounds S1-S2 noted  Abdomen soft nontender  Awake follows commands  No pedal edema  No rash    Additional Data:     Labs:  Results from last 7 days   Lab Units 24  0532 24  1802   WBC Thousand/uL  --  8.23   HEMOGLOBIN g/dL  --  12.2   HEMATOCRIT %  --  34.8*   PLATELETS Thousands/uL 240 219   NEUTROS PCT %  --  75   LYMPHS PCT %  --  14   MONOS PCT %  --  9   EOS PCT %  --  0     Results from last 7 days   Lab Units 24  0533 24  1802   SODIUM mmol/L 131* 129*   POTASSIUM mmol/L 4.6 4.1   CHLORIDE mmol/L 94* 90*   CO2 mmol/L 28 28   BUN mg/dL 19 12   CREATININE mg/dL 0.62 0.62   ANION GAP mmol/L 9 11   CALCIUM mg/dL 9.4 9.5   ALBUMIN g/dL  --  3.8   TOTAL BILIRUBIN mg/dL  --  0.91   ALK PHOS U/L  --  84   ALT U/L  --  16   AST U/L  --  25   GLUCOSE RANDOM mg/dL 142* 107     Results from last 7 days   Lab Units 24  1802   INR  1.05     Results from last 7 days   Lab Units 24  0752   POC GLUCOSE mg/dl 83               Lines/Drains:  Invasive Devices       Peripheral Intravenous Line  Duration             Peripheral IV 02/10/24 Distal;Right;Upper;Ventral (anterior) Arm <1 day                          Imaging: Reviewed radiology reports from this admission including: MRI spine    Recent Cultures (last 7  days):   Results from last 7 days   Lab Units 02/08/24  0937   GRAM STAIN RESULT  2+ Polys  No bacteria seen   BODY FLUID CULTURE, STERILE  No growth       Last 24 Hours Medication List:   Current Facility-Administered Medications   Medication Dose Route Frequency Provider Last Rate    acetaminophen  975 mg Oral Q8H SHANTAL Jb Shiu, DO      bisacodyl  10 mg Rectal Daily Guera Hernández MD      enoxaparin  40 mg Subcutaneous Daily MARY Sampson      HYDROmorphone  0.5 mg Intravenous Q4H PRN Jb Shiu, DO      HYDROmorphone  6 mg Oral Q8H SHANTAL Jb Shiu, DO      HYDROmorphone  6 mg Oral Q4H PRN Jb Shiu, DO      Or    HYDROmorphone  8 mg Oral Q4H PRN Jb Shiu, DO      ipratropium  0.5 mg Nebulization TID Guera Hernández MD      levalbuterol  1.25 mg Nebulization TID Guera Hernández MD      lidocaine  2 patch Topical Daily Guera Hernández MD      menthol-methyl salicylate   Apply externally TID Guera Hernández MD      naloxone  0.04 mg Intravenous Q1MIN PRN Jb Shiu, DO      nicotine  14 mg Transdermal Daily Guera Hernández MD      polyethylene glycol  17 g Oral Q12H Guera Hernández MD      senna-docusate sodium  2 tablet Oral BID Jb Shiu, DO          Today, Patient Was Seen By: Guera Hernández MD    **Please Note: This note may have been constructed using a voice recognition system.**

## 2024-02-10 NOTE — ASSESSMENT & PLAN NOTE
Imaging studies show extensive metastatic disease including bones  Opiate analgesics with palliative care  Supportive cares  Palliative care inputs noted   Kerry Parker  (RN)  2017 15:54:44 Kerry Parker  (RN)  2017 15:54:20

## 2024-02-10 NOTE — PLAN OF CARE
Problem: Potential for Falls  Goal: Patient will remain free of falls  Description: INTERVENTIONS:  - Educate patient/family on patient safety including physical limitations  - Instruct patient to call for assistance with activity   - Consult OT/PT to assist with strengthening/mobility   - Keep Call bell within reach  - Keep bed low and locked with side rails adjusted as appropriate  - Keep care items and personal belongings within reach  - Initiate and maintain comfort rounds  - Make Fall Risk Sign visible to staff  - Offer Toileting every 2 Hours, in advance of need  - Initiate/Maintain bed alarm  - Obtain necessary fall risk management equipment:   - Apply yellow socks and bracelet for high fall risk patients  - Consider moving patient to room near nurses station  Outcome: Progressing     Problem: PAIN - ADULT  Goal: Verbalizes/displays adequate comfort level or baseline comfort level  Description: Interventions:  - Encourage patient to monitor pain and request assistance  - Assess pain using appropriate pain scale  - Administer analgesics based on type and severity of pain and evaluate response  - Implement non-pharmacological measures as appropriate and evaluate response  - Consider cultural and social influences on pain and pain management  - Notify physician/advanced practitioner if interventions unsuccessful or patient reports new pain  Outcome: Progressing

## 2024-02-10 NOTE — PLAN OF CARE
Problem: Potential for Falls  Goal: Patient will remain free of falls  Description: INTERVENTIONS:  - Educate patient/family on patient safety including physical limitations  - Instruct patient to call for assistance with activity   - Consult OT/PT to assist with strengthening/mobility   - Keep Call bell within reach  - Keep bed low and locked with side rails adjusted as appropriate  - Keep care items and personal belongings within reach  - Initiate and maintain comfort rounds  - Make Fall Risk Sign visible to staff  - Offer Toileting every  Hours, in advance of need  - Initiate/Maintain alarm  - Obtain necessary fall risk management equipment:   - Apply yellow socks and bracelet for high fall risk patients  - Consider moving patient to room near nurses station  Outcome: Progressing     Problem: PAIN - ADULT  Goal: Verbalizes/displays adequate comfort level or baseline comfort level  Description: Interventions:  - Encourage patient to monitor pain and request assistance  - Assess pain using appropriate pain scale  - Administer analgesics based on type and severity of pain and evaluate response  - Implement non-pharmacological measures as appropriate and evaluate response  - Consider cultural and social influences on pain and pain management  - Notify physician/advanced practitioner if interventions unsuccessful or patient reports new pain  Outcome: Progressing     Problem: INFECTION - ADULT  Goal: Absence or prevention of progression during hospitalization  Description: INTERVENTIONS:  - Assess and monitor for signs and symptoms of infection  - Monitor lab/diagnostic results  - Monitor all insertion sites, i.e. indwelling lines, tubes, and drains  - Monitor endotracheal if appropriate and nasal secretions for changes in amount and color  - [place] appropriate cooling/warming therapies per order  - Administer medications as ordered  - Instruct and encourage patient and family to use good hand hygiene technique  -  Identify and instruct in appropriate isolation precautions for identified infection/condition  Outcome: Progressing  Goal: Absence of fever/infection during neutropenic period  Description: INTERVENTIONS:  - Monitor WBC    Outcome: Progressing     Problem: SAFETY ADULT  Goal: Patient will remain free of falls  Description: INTERVENTIONS:  - Educate patient/family on patient safety including physical limitations  - Instruct patient to call for assistance with activity   - Consult OT/PT to assist with strengthening/mobility   - Keep Call bell within reach  - Keep bed low and locked with side rails adjusted as appropriate  - Keep care items and personal belongings within reach  - Initiate and maintain comfort rounds  - Make Fall Risk Sign visible to staff  - Offer Toileting every  Hours, in advance of need  - Initiate/Maintainalarm  - Obtain necessary fall risk management equipment:   - Apply yellow socks and bracelet for high fall risk patients  - Consider moving patient to room near nurses station  Outcome: Progressing  Goal: Maintain or return to baseline ADL function  Description: INTERVENTIONS:  -  Assess patient's ability to carry out ADLs; assess patient's baseline for ADL function and identify physical deficits which impact ability to perform ADLs (bathing, care of mouth/teeth, toileting, grooming, dressing, etc.)  - Assess/evaluate cause of self-care deficits   - Assess range of motion  - Assess patient's mobility; develop plan if impaired  - Assess patient's need for assistive devices and provide as appropriate  - Encourage maximum independence but intervene and supervise when necessary  - Involve family in performance of ADLs  - Assess for home care needs following discharge   - Consider OT consult to assist with ADL evaluation and planning for discharge  - Provide patient education as appropriate  Outcome: Progressing  Goal: Maintains/Returns to pre admission functional level  Description: INTERVENTIONS:  -  Perform AM-PAC 6 Click Basic Mobility/ Daily Activity assessment daily.  - Set and communicate daily mobility goal to care team and patient/family/caregiver.   - Collaborate with rehabilitation services on mobility goals if consulted  - Perform Range of Motion  times a day.  - Reposition patient every  hours.  - Dangle patient  times a day  - Stand patient times a day  - Ambulate patient  times a day  - Out of bed to chair  times a day   - Out of bed for meals  times a day  - Out of bed for toileting  - Record patient progress and toleration of activity level   Outcome: Progressing     Problem: DISCHARGE PLANNING  Goal: Discharge to home or other facility with appropriate resources  Description: INTERVENTIONS:  - Identify barriers to discharge w/patient and caregiver  - Arrange for needed discharge resources and transportation as appropriate  - Identify discharge learning needs (meds, wound care, etc.)  - Arrange for interpretive services to assist at discharge as needed  - Refer to Case Management Department for coordinating discharge planning if the patient needs post-hospital services based on physician/advanced practitioner order or complex needs related to functional status, cognitive ability, or social support system  Outcome: Progressing     Problem: Knowledge Deficit  Goal: Patient/family/caregiver demonstrates understanding of disease process, treatment plan, medications, and discharge instructions  Description: Complete learning assessment and assess knowledge base.  Interventions:  - Provide teaching at level of understanding  - Provide teaching via preferred learning methods  Outcome: Progressing     Problem: RESPIRATORY - ADULT  Goal: Achieves optimal ventilation and oxygenation  Description: INTERVENTIONS:  - Assess for changes in respiratory status  - Assess for changes in mentation and behavior  - Position to facilitate oxygenation and minimize respiratory effort  - Oxygen administered by appropriate  delivery if ordered  - Initiate smoking cessation education as indicated  - Encourage broncho-pulmonary hygiene including cough, deep breathe, Incentive Spirometry  - Assess the need for suctioning and aspirate as needed  - Assess and instruct to report SOB or any respiratory difficulty  - Respiratory Therapy support as indicated  Outcome: Progressing     Problem: Nutrition/Hydration-ADULT  Goal: Nutrient/Hydration intake appropriate for improving, restoring or maintaining nutritional needs  Description: Monitor and assess patient's nutrition/hydration status for malnutrition. Collaborate with interdisciplinary team and initiate plan and interventions as ordered.  Monitor patient's weight and dietary intake as ordered or per policy. Utilize nutrition screening tool and intervene as necessary. Determine patient's food preferences and provide high-protein, high-caloric foods as appropriate.     INTERVENTIONS:  - Monitor oral intake, urinary output, labs, and treatment plans  - Assess nutrition and hydration status and recommend course of action  - Evaluate amount of meals eaten  - Assist patient with eating if necessary   - Allow adequate time for meals  - Recommend/ encourage appropriate diets, oral nutritional supplements, and vitamin/mineral supplements  - Order, calculate, and assess calorie counts as needed  - Recommend, monitor, and adjust tube feedings and TPN/PPN based on assessed needs  - Assess need for intravenous fluids  - Provide specific nutrition/hydration education as appropriate  - Include patient/family/caregiver in decisions related to nutrition  Outcome: Progressing

## 2024-02-11 PROBLEM — M25.512 LEFT SHOULDER PAIN: Status: ACTIVE | Noted: 2024-02-11

## 2024-02-11 NOTE — ASSESSMENT & PLAN NOTE
Left shoulder pain, pain radiating to little finger  Cervical spine reveals features of cervical spine stenosis metastatic disease possibly contributing to symptoms  Add gabapentin  Analgesics, supportive cares  Patient is planned for radiation therapy  Check left shoulder x-ray  Physical therapy

## 2024-02-11 NOTE — ASSESSMENT & PLAN NOTE
Acute hypoxic respiratory failure  Multifactorial  Patient noted to have right pleural effusion, emphysematous lungs, lung cancer encasing distal trachea right bronchi, SVC  Continue supplemental oxygen wean as tolerated to keep O2 sats more than 90 to 92%  Monitor ambulatory O2 sats  Encourage incentive spirometry

## 2024-02-11 NOTE — PLAN OF CARE
Problem: Potential for Falls  Goal: Patient will remain free of falls  Description: INTERVENTIONS:  - Educate patient/family on patient safety including physical limitations  - Instruct patient to call for assistance with activity   - Consult OT/PT to assist with strengthening/mobility   - Keep Call bell within reach  - Keep bed low and locked with side rails adjusted as appropriate  - Keep care items and personal belongings within reach  - Initiate and maintain comfort rounds  - Make Fall Risk Sign visible to staff  - Offer Toileting every 2 Hours, in advance of need  - Initiate/Maintain BED alarm  - Obtain necessary fall risk management equipment  - Apply yellow socks and bracelet for high fall risk patients  - Consider moving patient to room near nurses station  Outcome: Progressing     Problem: PAIN - ADULT  Goal: Verbalizes/displays adequate comfort level or baseline comfort level  Description: Interventions:  - Encourage patient to monitor pain and request assistance  - Assess pain using appropriate pain scale  - Administer analgesics based on type and severity of pain and evaluate response  - Implement non-pharmacological measures as appropriate and evaluate response  - Consider cultural and social influences on pain and pain management  - Notify physician/advanced practitioner if interventions unsuccessful or patient reports new pain  Outcome: Progressing     Problem: INFECTION - ADULT  Goal: Absence or prevention of progression during hospitalization  Description: INTERVENTIONS:  - Assess and monitor for signs and symptoms of infection  - Monitor lab/diagnostic results  - Monitor all insertion sites, i.e. indwelling lines, tubes, and drains  - Monitor endotracheal if appropriate and nasal secretions for changes in amount and color  - [place] appropriate cooling/warming therapies per order  - Administer medications as ordered  - Instruct and encourage patient and family to use good hand hygiene  technique  - Identify and instruct in appropriate isolation precautions for identified infection/condition  Outcome: Progressing  Goal: Absence of fever/infection during neutropenic period  Description: INTERVENTIONS:  - Monitor WBC    Outcome: Progressing     Problem: SAFETY ADULT  Goal: Patient will remain free of falls  Description: INTERVENTIONS:  - Educate patient/family on patient safety including physical limitations  - Instruct patient to call for assistance with activity   - Consult OT/PT to assist with strengthening/mobility   - Keep Call bell within reach  - Keep bed low and locked with side rails adjusted as appropriate  - Keep care items and personal belongings within reach  - Initiate and maintain comfort rounds  - Make Fall Risk Sign visible to staff  - Offer Toileting every 2 Hours, in advance of need  - Initiate/Maintain bed alarm  - Obtain necessary fall risk management equipment  - Apply yellow socks and bracelet for high fall risk patients  - Consider moving patient to room near nurses station  Outcome: Progressing  Goal: Maintain or return to baseline ADL function  Description: INTERVENTIONS:  -  Assess patient's ability to carry out ADLs; assess patient's baseline for ADL function and identify physical deficits which impact ability to perform ADLs (bathing, care of mouth/teeth, toileting, grooming, dressing, etc.)  - Assess/evaluate cause of self-care deficits   - Assess range of motion  - Assess patient's mobility; develop plan if impaired  - Assess patient's need for assistive devices and provide as appropriate  - Encourage maximum independence but intervene and supervise when necessary  - Involve family in performance of ADLs  - Assess for home care needs following discharge   - Consider OT consult to assist with ADL evaluation and planning for discharge  - Provide patient education as appropriate  Outcome: Progressing  Goal: Maintains/Returns to pre admission functional level  Description:  INTERVENTIONS:  - Perform AM-PAC 6 Click Basic Mobility/ Daily Activity assessment daily.  - Set and communicate daily mobility goal to care team and patient/family/caregiver.   - Collaborate with rehabilitation services on mobility goals if consulted  - Perform Range of Motion 2 times a day.  - Reposition patient every 2 hours.  - Dangle patient 2 times a day  - Stand patient 2 times a day  - Ambulate patient 2 times a day  - Out of bed to chair 3 times a day   - Out of bed for meals 3 times a day  - Out of bed for toileting  - Record patient progress and toleration of activity level   Outcome: Progressing     Problem: DISCHARGE PLANNING  Goal: Discharge to home or other facility with appropriate resources  Description: INTERVENTIONS:  - Identify barriers to discharge w/patient and caregiver  - Arrange for needed discharge resources and transportation as appropriate  - Identify discharge learning needs (meds, wound care, etc.)  - Arrange for interpretive services to assist at discharge as needed  - Refer to Case Management Department for coordinating discharge planning if the patient needs post-hospital services based on physician/advanced practitioner order or complex needs related to functional status, cognitive ability, or social support system  Outcome: Progressing     Problem: Knowledge Deficit  Goal: Patient/family/caregiver demonstrates understanding of disease process, treatment plan, medications, and discharge instructions  Description: Complete learning assessment and assess knowledge base.  Interventions:  - Provide teaching at level of understanding  - Provide teaching via preferred learning methods  Outcome: Progressing     Problem: RESPIRATORY - ADULT  Goal: Achieves optimal ventilation and oxygenation  Description: INTERVENTIONS:  - Assess for changes in respiratory status  - Assess for changes in mentation and behavior  - Position to facilitate oxygenation and minimize respiratory effort  - Oxygen  administered by appropriate delivery if ordered  - Initiate smoking cessation education as indicated  - Encourage broncho-pulmonary hygiene including cough, deep breathe, Incentive Spirometry  - Assess the need for suctioning and aspirate as needed  - Assess and instruct to report SOB or any respiratory difficulty  - Respiratory Therapy support as indicated  Outcome: Progressing     Problem: Nutrition/Hydration-ADULT  Goal: Nutrient/Hydration intake appropriate for improving, restoring or maintaining nutritional needs  Description: Monitor and assess patient's nutrition/hydration status for malnutrition. Collaborate with interdisciplinary team and initiate plan and interventions as ordered.  Monitor patient's weight and dietary intake as ordered or per policy. Utilize nutrition screening tool and intervene as necessary. Determine patient's food preferences and provide high-protein, high-caloric foods as appropriate.     INTERVENTIONS:  - Monitor oral intake, urinary output, labs, and treatment plans  - Assess nutrition and hydration status and recommend course of action  - Evaluate amount of meals eaten  - Assist patient with eating if necessary   - Allow adequate time for meals  - Recommend/ encourage appropriate diets, oral nutritional supplements, and vitamin/mineral supplements  - Order, calculate, and assess calorie counts as needed  - Recommend, monitor, and adjust tube feedings and TPN/PPN based on assessed needs  - Assess need for intravenous fluids  - Provide specific nutrition/hydration education as appropriate  - Include patient/family/caregiver in decisions related to nutrition  Outcome: Progressing     Problem: Prexisting or High Potential for Compromised Skin Integrity  Goal: Skin integrity is maintained or improved  Description: INTERVENTIONS:  - Identify patients at risk for skin breakdown  - Assess and monitor skin integrity  - Assess and monitor nutrition and hydration status  - Monitor labs   -  Assess for incontinence   - Turn and reposition patient  - Assist with mobility/ambulation  - Relieve pressure over bony prominences  - Avoid friction and shearing  - Provide appropriate hygiene as needed including keeping skin clean and dry  - Evaluate need for skin moisturizer/barrier cream  - Collaborate with interdisciplinary team   - Patient/family teaching  - Consider wound care consult   Outcome: Progressing

## 2024-02-11 NOTE — PLAN OF CARE
Problem: Potential for Falls  Goal: Patient will remain free of falls  Description: INTERVENTIONS:  - Educate patient/family on patient safety including physical limitations  - Instruct patient to call for assistance with activity   - Consult OT/PT to assist with strengthening/mobility   - Keep Call bell within reach  - Keep bed low and locked with side rails adjusted as appropriate  - Keep care items and personal belongings within reach  - Initiate and maintain comfort rounds  - Make Fall Risk Sign visible to staff  - Obtain necessary fall risk management equipment  - Apply yellow socks and bracelet for high fall risk patients  - Consider moving patient to room near nurses station  Outcome: Progressing     Problem: PAIN - ADULT  Goal: Verbalizes/displays adequate comfort level or baseline comfort level  Description: Interventions:  - Encourage patient to monitor pain and request assistance  - Assess pain using appropriate pain scale  - Administer analgesics based on type and severity of pain and evaluate response  - Implement non-pharmacological measures as appropriate and evaluate response  - Consider cultural and social influences on pain and pain management  - Notify physician/advanced practitioner if interventions unsuccessful or patient reports new pain  Outcome: Progressing     Problem: INFECTION - ADULT  Goal: Absence or prevention of progression during hospitalization  Description: INTERVENTIONS:  - Assess and monitor for signs and symptoms of infection  - Monitor lab/diagnostic results  - Monitor all insertion sites, i.e. indwelling lines, tubes, and drains  - Monitor endotracheal if appropriate and nasal secretions for changes in amount and color  - [place] appropriate cooling/warming therapies per order  - Administer medications as ordered  - Instruct and encourage patient and family to use good hand hygiene technique  - Identify and instruct in appropriate isolation precautions for identified  infection/condition  Outcome: Progressing  Goal: Absence of fever/infection during neutropenic period  Description: INTERVENTIONS:  - Monitor WBC    Outcome: Progressing     Problem: SAFETY ADULT  Goal: Patient will remain free of falls  Description: INTERVENTIONS:  - Educate patient/family on patient safety including physical limitations  - Instruct patient to call for assistance with activity   - Consult OT/PT to assist with strengthening/mobility   - Keep Call bell within reach  - Keep bed low and locked with side rails adjusted as appropriate  - Keep care items and personal belongings within reach  - Initiate and maintain comfort rounds  - Make Fall Risk Sign visible to staff  - Obtain necessary fall risk management equipment  - Apply yellow socks and bracelet for high fall risk patients  - Consider moving patient to room near nurses station  Outcome: Progressing  Goal: Maintain or return to baseline ADL function  Description: INTERVENTIONS:  -  Assess patient's ability to carry out ADLs; assess patient's baseline for ADL function and identify physical deficits which impact ability to perform ADLs (bathing, care of mouth/teeth, toileting, grooming, dressing, etc.)  - Assess/evaluate cause of self-care deficits   - Assess range of motion  - Assess patient's mobility; develop plan if impaired  - Assess patient's need for assistive devices and provide as appropriate  - Encourage maximum independence but intervene and supervise when necessary  - Involve family in performance of ADLs  - Assess for home care needs following discharge   - Consider OT consult to assist with ADL evaluation and planning for discharge  - Provide patient education as appropriate  Outcome: Progressing  Goal: Maintains/Returns to pre admission functional level  Description: INTERVENTIONS:  - Perform AM-PAC 6 Click Basic Mobility/ Daily Activity assessment daily.  - Set and communicate daily mobility goal to care team and  patient/family/caregiver.   - Collaborate with rehabilitation services on mobility goals if consulted  - Out of bed for toileting  - Record patient progress and toleration of activity level   Outcome: Progressing     Problem: DISCHARGE PLANNING  Goal: Discharge to home or other facility with appropriate resources  Description: INTERVENTIONS:  - Identify barriers to discharge w/patient and caregiver  - Arrange for needed discharge resources and transportation as appropriate  - Identify discharge learning needs (meds, wound care, etc.)  - Arrange for interpretive services to assist at discharge as needed  - Refer to Case Management Department for coordinating discharge planning if the patient needs post-hospital services based on physician/advanced practitioner order or complex needs related to functional status, cognitive ability, or social support system  Outcome: Progressing     Problem: Knowledge Deficit  Goal: Patient/family/caregiver demonstrates understanding of disease process, treatment plan, medications, and discharge instructions  Description: Complete learning assessment and assess knowledge base.  Interventions:  - Provide teaching at level of understanding  - Provide teaching via preferred learning methods  Outcome: Progressing     Problem: RESPIRATORY - ADULT  Goal: Achieves optimal ventilation and oxygenation  Description: INTERVENTIONS:  - Assess for changes in respiratory status  - Assess for changes in mentation and behavior  - Position to facilitate oxygenation and minimize respiratory effort  - Oxygen administered by appropriate delivery if ordered  - Initiate smoking cessation education as indicated  - Encourage broncho-pulmonary hygiene including cough, deep breathe, Incentive Spirometry  - Assess the need for suctioning and aspirate as needed  - Assess and instruct to report SOB or any respiratory difficulty  - Respiratory Therapy support as indicated  Outcome: Progressing     Problem:  Nutrition/Hydration-ADULT  Goal: Nutrient/Hydration intake appropriate for improving, restoring or maintaining nutritional needs  Description: Monitor and assess patient's nutrition/hydration status for malnutrition. Collaborate with interdisciplinary team and initiate plan and interventions as ordered.  Monitor patient's weight and dietary intake as ordered or per policy. Utilize nutrition screening tool and intervene as necessary. Determine patient's food preferences and provide high-protein, high-caloric foods as appropriate.     INTERVENTIONS:  - Monitor oral intake, urinary output, labs, and treatment plans  - Assess nutrition and hydration status and recommend course of action  - Evaluate amount of meals eaten  - Assist patient with eating if necessary   - Allow adequate time for meals  - Recommend/ encourage appropriate diets, oral nutritional supplements, and vitamin/mineral supplements  - Order, calculate, and assess calorie counts as needed  - Recommend, monitor, and adjust tube feedings and TPN/PPN based on assessed needs  - Assess need for intravenous fluids  - Provide specific nutrition/hydration education as appropriate  - Include patient/family/caregiver in decisions related to nutrition  Outcome: Progressing     Problem: Prexisting or High Potential for Compromised Skin Integrity  Goal: Skin integrity is maintained or improved  Description: INTERVENTIONS:  - Identify patients at risk for skin breakdown  - Assess and monitor skin integrity  - Assess and monitor nutrition and hydration status  - Monitor labs   - Assess for incontinence   - Turn and reposition patient  - Assist with mobility/ambulation  - Relieve pressure over bony prominences  - Avoid friction and shearing  - Provide appropriate hygiene as needed including keeping skin clean and dry  - Evaluate need for skin moisturizer/barrier cream  - Collaborate with interdisciplinary team   - Patient/family teaching  - Consider wound care consult    Outcome: Progressing

## 2024-02-11 NOTE — ASSESSMENT & PLAN NOTE
Large right pleural effusion noted on CT chest  Status post thoracocentesis per pulmonary  Pulmonary plans will indwelling pleural catheter placement given recurrence of pleural effusion noted  Pulmonary following  Follow-up on cytology

## 2024-02-11 NOTE — ASSESSMENT & PLAN NOTE
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  CTA PE study : \"Redemonstration of tumor encasing the distal trachea and right bronchi extending into the anterior mediastinum and lower neck bilaterally encasing and narrowing of the brachiocephalic veins and SVC and occlusion of the right bronchi. Mild bilateral axillary lymphadenopathy, possibly metastatic. Liver metastases better shown on prior study.\"  Radiation oncology plans to initiate radiotherapy noted  Oncology plans for imaging and close outpatient follow-up for initiating chemotherapy noted  Analgesics, supportive cares  Outpatient oncology follow-up  "

## 2024-02-11 NOTE — PROGRESS NOTES
"[facility]  Progress Note  Name: Abdulaziz Gomez I  MRN: [id number]  Unit/Bed#: S -01 I Date of Admission: 2/7/2024   Date of Service: 2/11/2024 I Hospital Day: 4    Assessment/Plan   * Acute respiratory failure with hypoxia (HCC)  Assessment & Plan  Acute hypoxic respiratory failure  Multifactorial  Patient noted to have right pleural effusion, emphysematous lungs, lung cancer encasing distal trachea right bronchi, SVC  Continue supplemental oxygen wean as tolerated to keep O2 sats more than 90 to 92%  Monitor ambulatory O2 sats  Encourage incentive spirometry      Metastatic non-small cell lung cancer (HCC)  Assessment & Plan  Patient recently diagnosed with 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  CTA PE study : \"Redemonstration of tumor encasing the distal trachea and right bronchi extending into the anterior mediastinum and lower neck bilaterally encasing and narrowing of the brachiocephalic veins and SVC and occlusion of the right bronchi. Mild bilateral axillary lymphadenopathy, possibly metastatic. Liver metastases better shown on prior study.\"  Radiation oncology plans to initiate radiotherapy noted  Oncology plans for imaging and close outpatient follow-up for initiating chemotherapy noted  Analgesics, supportive cares  Outpatient oncology follow-up    Severe protein-calorie malnutrition (HCC)  Assessment & Plan       Body mass index is 20.32 kg/m².   Encourage protein and calorie intake    COPD (chronic obstructive pulmonary disease) (HCC)  Assessment & Plan  No documented PFTs on file  Continue respiratory treatments  Pulm inputs noted  Supportive cares    Hyponatremia  Assessment & Plan  Multifactorial  Monitor    Pleural effusion on right  Assessment & Plan  Large right pleural effusion noted on CT chest  Status post thoracocentesis per pulmonary  Pulmonary plans will indwelling pleural catheter placement given recurrence of pleural effusion " noted  Pulmonary following  Follow-up on cytology    Cancer related pain  Assessment & Plan  Imaging studies show extensive metastatic disease including bones  Opiate analgesics with palliative care  Palliative care following  Supportive cares    Current every day smoker  Assessment & Plan  Tobacco cessation discussed and strongly encouraged               VTE Pharmacologic Prophylaxis: VTE Score: 6 High Risk (Score >/= 5) - Pharmacological DVT Prophylaxis Ordered: enoxaparin (Lovenox). Sequential Compression Devices Ordered.    Mobility:   Basic Mobility Inpatient Raw Score: 17  JH-HLM Goal: 5: Stand one or more mins  JH-HLM Achieved: 6: Walk 10 steps or more  HLM Goal NOT achieved. Continue with multidisciplinary rounding and encourage appropriate mobility to improve upon HLM goals.    Patient Centered Rounds: I performed bedside rounds with nursing staff today.   Discussions with Specialists or Other Care Team Provider: case Management    Education and Discussions with Family / Patient:  patient, updated daughter Soniya in detail, questions answered.     Total Time Spent on Date of Encounter in care of patient: 30 mins. This time was spent on one or more of the following: performing physical exam; counseling and coordination of care; obtaining or reviewing history; documenting in the medical record; reviewing/ordering tests, medications or procedures; communicating with other healthcare professionals and discussing with patient's family/caregivers.    Current Length of Stay: 4 day(s)  Current Patient Status: Inpatient   Certification Statement: The patient will continue to require additional inpatient hospital stay due to as outlined  Discharge Plan:  Disposition planning -physical therapy evaluation, pulmonary plans for IPC placement noted, disposition planning case management following    Code Status: Level 1 - Full Code    Subjective:     Comfortably in bed  Reports aches and pains  He reports left shoulder pain  occasional numbness along his little fingers  Encourage incentive spirometry      Objective:     Vitals:   Temp (24hrs), Av.5 °F (36.4 °C), Min:97.4 °F (36.3 °C), Max:97.5 °F (36.4 °C)    Temp:  [97.4 °F (36.3 °C)-97.5 °F (36.4 °C)] 97.5 °F (36.4 °C)  HR:  [] 90  Resp:  [18] 18  BP: (124-154)/(73-83) 135/81  SpO2:  [90 %-95 %] 95 %  Body mass index is 20.32 kg/m².     Input and Output Summary (last 24 hours):     Intake/Output Summary (Last 24 hours) at 2024 1318  Last data filed at 2024 1258  Gross per 24 hour   Intake --   Output 1050 ml   Net -1050 ml       Physical Exam:   Physical Exam       Comfortably in bed  Hoarseness of voice noted  Features of protein calorie malnutrition noted  Neck supple  Lungs emphysematous  Diminished breath sounds  Scattered rhonchi  Heart sounds S1-S2 noted  Abdomen soft nontender  Awake follows commands  No pedal edema  No rash    Additional Data:     Labs:  Results from last 7 days   Lab Units 24  0532 24  1802   WBC Thousand/uL  --  8.23   HEMOGLOBIN g/dL  --  12.2   HEMATOCRIT %  --  34.8*   PLATELETS Thousands/uL 240 219   NEUTROS PCT %  --  75   LYMPHS PCT %  --  14   MONOS PCT %  --  9   EOS PCT %  --  0     Results from last 7 days   Lab Units 24  0533 24  1802   SODIUM mmol/L 131* 129*   POTASSIUM mmol/L 4.6 4.1   CHLORIDE mmol/L 94* 90*   CO2 mmol/L 28 28   BUN mg/dL 19 12   CREATININE mg/dL 0.62 0.62   ANION GAP mmol/L 9 11   CALCIUM mg/dL 9.4 9.5   ALBUMIN g/dL  --  3.8   TOTAL BILIRUBIN mg/dL  --  0.91   ALK PHOS U/L  --  84   ALT U/L  --  16   AST U/L  --  25   GLUCOSE RANDOM mg/dL 142* 107     Results from last 7 days   Lab Units 24  1802   INR  1.05     Results from last 7 days   Lab Units 24  0752   POC GLUCOSE mg/dl 83               Lines/Drains:  Invasive Devices       Peripheral Intravenous Line  Duration             Peripheral IV 02/10/24 Distal;Right;Upper;Ventral (anterior) Arm 1 day                           Imaging: Reviewed radiology reports from this admission including: chest xray and MRI spine    Recent Cultures (last 7 days):   Results from last 7 days   Lab Units 02/08/24  0937   GRAM STAIN RESULT  2+ Polys  No bacteria seen   BODY FLUID CULTURE, STERILE  No growth       Last 24 Hours Medication List:   Current Facility-Administered Medications   Medication Dose Route Frequency Provider Last Rate    acetaminophen  975 mg Oral Q8H SHANTAL Jb Shiu, DO      enoxaparin  40 mg Subcutaneous Daily MARY Sampson      HYDROmorphone  0.5 mg Intravenous Q4H PRN Jb Shiu, DO      HYDROmorphone  6 mg Oral Q8H SHANTAL Jb Shiu, DO      HYDROmorphone  6 mg Oral Q4H PRN Jb Shiu, DO      Or    HYDROmorphone  8 mg Oral Q4H PRN Jb Shiu, DO      ipratropium  0.5 mg Nebulization TID Guera Hernández MD      levalbuterol  1.25 mg Nebulization TID Guera Hernández MD      lidocaine  2 patch Topical Daily Guera Hernández MD      menthol-methyl salicylate   Apply externally TID Guera Hernández MD      naloxone  0.04 mg Intravenous Q1MIN PRN Jb Shiu, DO      nicotine  14 mg Transdermal Daily Guera Hernández MD      polyethylene glycol  17 g Oral Q12H Guera Hernández MD      senna-docusate sodium  2 tablet Oral BID Jb Shiu, DO          Today, Patient Was Seen By: Guera Hernández MD    **Please Note: This note may have been constructed using a voice recognition system.**

## 2024-02-11 NOTE — PROGRESS NOTES
Progress Note - Pulmonary   Abdulaziz Gomez 63 y.o. male MRN: [id number]  Unit/Bed#: S -01 Encounter: [id number]    Assessment/Plan:    Acute hypoxic respiratory failure, T factorial due to listed below  -95% on 6 L  -Continue to maintain saturation greater than 89%  -Pulmonary hygiene encouraged: Deep breathing with cough, OOB as tolerated, incentive spirometry and flutter valve as tolerated  -Assess home O2 requirements prior to discharge    Right-sided pleural effusion  -Thoracentesis completed 02/08/2024, 1 L clear yellow fluid removed, unable to remove all fluid secondary to discomfort by patient  -Cytology pending, suspected malignant fluid  -Chest x-ray shows reaccumulation of fluid, will recommend IPC for possibly Monday    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 FDG avid intrathoracic disease most extensive at the superior mediastinum and right perihilar region concerning for malignancy.  2. Extensive FDG avid peggy disease in the lower neck, chest and abdomen suspicious for metastasis.  3. Scattered FDG-avid hepatic lesions concerning for metastasis.  4. Innumerable FDG avid osseous lesions compatible with widespread osseous metastasis.  5. A few small foci of FDG uptake along the left anterior thigh musculature for which intramuscular metastasis is not excluded.  -Medical oncology and radiation oncology following, radiation and chemo recommended to start a soon as possible  -Radiation simulation completed 2/9/2024  -Recommended treatment Alimta/carboplatin as soon as possible and then pembrolizumab as an outpatient along with chemotherapy     Hemoptysis  -CTA chest PE study showed no pulmonary embolism  -Likely secondary to tumor burden  -Will continue to follow CBC     Suspected COPD of unknown severity  -Patient has had no formal PFTs  -Extensive history of smoking, currently still smokes 3 to 4 cigarettes a day  -Nebulizers offered but patient  refused       Chief Complaint:    My pain is better but breathing and swallowing wipe me out    Subjective:    Pt seen and examined at bedside.  Denies any overnight events.  Endorses better pain control today.  Discussed IPC placement and draining with patient and wife.  Daughter works in healthcare in some capacity and will also be good support for them.    Objective:    Vitals: Blood pressure 135/81, pulse 90, temperature 97.5 °F (36.4 °C), temperature source Oral, resp. rate 18, weight 62.4 kg (137 lb 9.1 oz), SpO2 95%.5 L,Body mass index is 20.32 kg/m².      Intake/Output Summary (Last 24 hours) at 2/11/2024 1156  Last data filed at 2/11/2024 0215  Gross per 24 hour   Intake --   Output 700 ml   Net -700 ml       Invasive Devices       Peripheral Intravenous Line  Duration             Peripheral IV 02/10/24 Distal;Right;Upper;Ventral (anterior) Arm 1 day                    Physical Exam:     Physical Exam  Vitals reviewed.   Constitutional:       General: He is not in acute distress.     Appearance: He is ill-appearing.   HENT:      Head: Normocephalic and atraumatic.      Right Ear: External ear normal.      Left Ear: External ear normal.      Nose: Nose normal.      Mouth/Throat:      Mouth: Mucous membranes are moist.      Pharynx: Oropharynx is clear.   Eyes:      Extraocular Movements: Extraocular movements intact.      Pupils: Pupils are equal, round, and reactive to light.   Cardiovascular:      Rate and Rhythm: Regular rhythm. Tachycardia present.      Pulses: Normal pulses.      Heart sounds: Normal heart sounds.   Pulmonary:      Effort: Pulmonary effort is normal.      Breath sounds: Rhonchi present.      Comments: Scattered rhonchi  Diminished breath sounds  Abdominal:      General: Bowel sounds are normal.   Musculoskeletal:      Cervical back: Normal range of motion and neck supple.      Right lower leg: No edema.      Left lower leg: No edema.   Skin:     General: Skin is warm and dry.  "  Neurological:      Mental Status: He is alert and oriented to person, place, and time.   Psychiatric:         Mood and Affect: Mood normal.         Thought Content: Thought content normal.         Judgment: Judgment normal.         Labs: I have personally reviewed pertinent lab results., ABG: No results found for: \"PHART\", \"CVW5QFX\", \"PO2ART\", \"HFS3WYT\", \"P2CRGPBD\", \"BEART\", \"SOURCE\", BNP: No results found for: \"BNP\", CBC: No results found for: \"WBC\", \"HGB\", \"HCT\", \"MCV\", \"PLT\", \"ADJUSTEDWBC\", \"RBC\", \"MCH\", \"MCHC\", \"RDW\", \"MPV\", \"NRBC\", CMP: No results found for: \"SODIUM\", \"K\", \"CL\", \"CO2\", \"ANIONGAP\", \"BUN\", \"CREATININE\", \"GLUCOSE\", \"CALCIUM\", \"AST\", \"ALT\", \"ALKPHOS\", \"PROT\", \"BILITOT\", \"EGFR\"        Imaging and other studies: I have personally reviewed pertinent reports.    Chest x-ray 2/10/2024 shows persistent right sided effusion  "

## 2024-02-11 NOTE — ASSESSMENT & PLAN NOTE
Imaging studies show extensive metastatic disease including bones  Opiate analgesics with palliative care  Palliative care following  Supportive cares

## 2024-02-11 NOTE — PHYSICAL THERAPY NOTE
Physical Therapy Cancellation Note         02/11/24 7262   Note Type   Note type Cancelled Session   Cancel Reasons Medical status   Additional Comments PT orders noted and chart reviewed.  Currently pending L shoulder x-ray.  Will hold PT evaluation until results obtained.     ALFRED LynT

## 2024-02-12 PROBLEM — I82.623 ACUTE DEEP VEIN THROMBOSIS (DVT) OF BOTH UPPER EXTREMITIES (HCC): Status: ACTIVE | Noted: 2024-01-01

## 2024-02-12 NOTE — CASE MANAGEMENT
"   Case Management Progress Note    Patient name Abdulaziz Gomez  Location S /S -01 MRN [id number]  : 1960 Date 2024       LOS (days): 5  Geometric Mean LOS (GMLOS) (days): 3.6  Days to GMLOS:-1.2        OBJECTIVE:        Current admission status: Inpatient  Preferred Pharmacy:   [id number] - [place], [place] - [address]  [address]  [place] [address]  Phone: [phone] Fax: [phone]    Primary Care Provider: Genesis Leonard MD    Primary Insurance: BLUE CROSS  Secondary Insurance:     PROGRESS NOTE:      CM met with patient and spouse at bedside this afternoon to discuss recommendation for at-home PT/OT/SN upon medical stability -- Both patient and spouse decline this service as they [place]no GPS capability due to lack of service), as well as a very bumpy driveway. Spouse states she served as a caregiver for the elderly before and feels comfortable taking him home without HHC services arranged. She confirmed she will provide 24/ care and supervision.    CM reviewed recommendation for BSC, RW, and hospital bed -- They are agreeable to BSC and RW, but do not want a hospital bed at this time. They feel he does not need a hospital bed, but are away they can change their minds at anytime.    CM will await processing/delivery of BSC and RW and plan for discharge accordingly.    "

## 2024-02-12 NOTE — ASSESSMENT & PLAN NOTE
Large right pleural effusion noted on CT chest  Status post thoracocentesis per pulmonary  Pulmonary plans indwelling pleural catheter placement given recurrence of pleural effusion noted; as per discussion with pulmonologist, this will be done tomorrow, 2/13, at 10 AM.  Thus, as per discussion with pulmonologist, heparin drip was ordered to be held off at 6 AM tomorrow, 2/13.  Pulmonary following  Follow-up on cytology

## 2024-02-12 NOTE — ASSESSMENT & PLAN NOTE
Today, patient was found to have significant swelling on his right upper extremity, thus, duplex scan was done.  Duplex scan results: Acute, nonocclusive DVT in the right subclavian vein, with superficial thrombophlebitis in the right cephalic vein; acute nonocclusive DVT of the left internal jugular and subclavian veins.  Due to these findings, heparin drip VTE protocol started today.  Discussed with pulmonologist as there is a plan of Asept catheter placement tomorrow.  Thus, heparin drip will be put on hold at 6 AM tomorrow for the asept catheter placement at 10 AM tomorrow, 2/13.  Patient's nurse was informed about this.  Medical and radiation oncologists were informed about this finding.

## 2024-02-12 NOTE — PLAN OF CARE
Problem: OCCUPATIONAL THERAPY ADULT  Goal: Performs self-care activities at highest level of function for planned discharge setting.  See evaluation for individualized goals.  Description: Treatment Interventions: ADL retraining, Functional transfer training, Endurance training, Patient/family training, Equipment evaluation/education, Compensatory technique education, Continued evaluation, Energy conservation  Equipment Recommended: Bedside commode, Shower/Tub chair with back ($) (hospital bed)       See flowsheet documentation for full assessment, interventions and recommendations.   Note: Limitation: Decreased ADL status, Decreased endurance, Decreased self-care trans, Decreased high-level ADLs (pain, balance, fxnl mobility, act glenn, fxnl reach, standing glenn, and strength, pacing)  Prognosis: Fair  Assessment: Pt is a 63 y.o. male seen for OT evaluation s/p admit to [facility] on 2/7/2024 w/Acute respiratory failure with hypoxia (HCC). Prior to admission, pt was living with spouse in a 1 level house, (I) with ADLs, (I) with IADLs, (-) falls, (+) . Personal and environmental factors affecting patient at time of evaluation include: new cancer diagnosis w/ widespread mets and associated pain, difficulty completing ADLs and difficulty completing IADLs. Personal factors supporting patient at time of evaluation include age, fully (I) PLOF, supportive spouse, attitude towards recovery, and FFSU. Based upon this evaluation, pt is functioning below baseline. Pt will benefit from continued skilled inpatient OT to maximize safety, level of independence overall performance in ADLs, functional transfers, functional mobility to return to functional baseline/highest level of function.     Rehab Resource Intensity Level, OT: III (Minimum Resource Intensity) (increased social support for ADL/IADL completion)     ELVA Ramirez, OTR/L  [place] License [id number]  [place] License [id number]

## 2024-02-12 NOTE — ASSESSMENT & PLAN NOTE
Left shoulder pain, pain radiating to little finger  Cervical spine reveals features of cervical spine stenosis metastatic disease possibly contributing to symptoms  Add gabapentin  Analgesics, supportive cares  Patient is planned for radiation therapy  Left shoulder x-ray: No acute osseous abnormality.  Spoke to palliative care advanced practitioner and main some adjustments in patient's pain management.  Physical therapy

## 2024-02-12 NOTE — PLAN OF CARE
Problem: PHYSICAL THERAPY ADULT  Goal: Performs mobility at highest level of function for planned discharge setting.  See evaluation for individualized goals.  Description: Treatment/Interventions: ADL retraining, Functional transfer training, LE strengthening/ROM, Elevations, Therapeutic exercise, Endurance training, Patient/family training, Equipment eval/education, Bed mobility, Gait training, Compensatory technique education, Spoke to case management, OT  Equipment Recommended: Walker       See flowsheet documentation for full assessment, interventions and recommendations.  Note:    Problem List: Decreased strength, Decreased endurance, Impaired balance, Decreased mobility, Decreased safety awareness, Pain  Assessment: Patient seen for Physical Therapy evaluation. Patient admitted with Acute respiratory failure with hypoxia (HCC).  Comorbidities affecting patient's physical performance include: Metastatic non-small lung cancer, COPD, severe protein calorie malnutrition, plantar fasciitis, chronic cough, chronic neck pain.  Personal factors affecting patient at time of initial evaluation include: lives in 1 story house, stairs to enter home, inability to navigate community distances, inability to navigate level surfaces without external assistance, and inability to perform dynamic tasks in community. Prior to admission, patient was independent with functional mobility without assistive device, independent with ADLS, independent with IADLS, living with spouse in a 1 level home with 6 steps to enter, ambulating household distance, ambulating community distances, and works full time.  Please find objective findings from Physical Therapy assessment regarding body systems outlined above with impairments and limitations including weakness, impaired balance, decreased endurance, impaired coordination, gait deviations, pain, decreased activity tolerance, decreased functional mobility tolerance, decreased safety  awareness, fall risk, and SOB upon exertion.  The Barthel Index was used as a functional outcome tool presenting with a score of Barthel Index Score: 50 today indicating marked limitations of functional mobility and ADLS.  Patient's clinical presentation is currently unstable/unpredictable as seen in patient's presentation of vital sign response, changing level of pain, increased fall risk, new onset of impairment of functional mobility, decreased endurance, and new onset of weakness. Pt would benefit from continued Physical Therapy treatment to address deficits as defined above and maximize level of functional mobility. As demonstrated by objective findings, the assigned level of complexity for this evaluation is high.The patient's -[facility] Basic Mobility Inpatient Short Form Raw Score is 17. A Raw score of greater than 16 suggests the patient may benefit from discharge to home. Please also refer to the recommendation of the Physical Therapist for safe discharge planning.        Rehab Resource Intensity Level, PT: III (Minimum Resource Intensity)    See flowsheet documentation for full assessment.

## 2024-02-12 NOTE — SOCIAL WORK
LSW attempted to see patient today. Pt was unable to meet with this writer--pt not feeling up for visit today and is requesting to rest. LSW will continue to follow.

## 2024-02-12 NOTE — PLAN OF CARE
Problem: Potential for Falls  Goal: Patient will remain free of falls  Description: INTERVENTIONS:  - Educate patient/family on patient safety including physical limitations  - Instruct patient to call for assistance with activity   - Consult OT/PT to assist with strengthening/mobility   - Keep Call bell within reach  - Keep bed low and locked with side rails adjusted as appropriate  - Keep care items and personal belongings within reach  - Initiate and maintain comfort rounds  - Make Fall Risk Sign visible to staff  - Offer Toileting every 2 Hours, in advance of need  - Initiate/Maintain bed alarm  - Obtain necessary fall risk management equipment  - Apply yellow socks and bracelet for high fall risk patients  - Consider moving patient to room near nurses station  Outcome: Progressing     Problem: PAIN - ADULT  Goal: Verbalizes/displays adequate comfort level or baseline comfort level  Description: Interventions:  - Encourage patient to monitor pain and request assistance  - Assess pain using appropriate pain scale  - Administer analgesics based on type and severity of pain and evaluate response  - Implement non-pharmacological measures as appropriate and evaluate response  - Consider cultural and social influences on pain and pain management  - Notify physician/advanced practitioner if interventions unsuccessful or patient reports new pain  Outcome: Progressing     Problem: INFECTION - ADULT  Goal: Absence or prevention of progression during hospitalization  Description: INTERVENTIONS:  - Assess and monitor for signs and symptoms of infection  - Monitor lab/diagnostic results  - Monitor all insertion sites, i.e. indwelling lines, tubes, and drains  - Monitor endotracheal if appropriate and nasal secretions for changes in amount and color  - Adams appropriate cooling/warming therapies per order  - Administer medications as ordered  - Instruct and encourage patient and family to use good hand hygiene  technique  - Identify and instruct in appropriate isolation precautions for identified infection/condition  Outcome: Progressing  Goal: Absence of fever/infection during neutropenic period  Description: INTERVENTIONS:  - Monitor WBC    Outcome: Progressing     Problem: SAFETY ADULT  Goal: Patient will remain free of falls  Description: INTERVENTIONS:  - Educate patient/family on patient safety including physical limitations  - Instruct patient to call for assistance with activity   - Consult OT/PT to assist with strengthening/mobility   - Keep Call bell within reach  - Keep bed low and locked with side rails adjusted as appropriate  - Keep care items and personal belongings within reach  - Initiate and maintain comfort rounds  - Make Fall Risk Sign visible to staff  - Offer Toileting every 2 Hours, in advance of need  - Initiate/Maintain bed alarm  - Obtain necessary fall risk management equipment  - Apply yellow socks and bracelet for high fall risk patients  - Consider moving patient to room near nurses station  Outcome: Progressing  Goal: Maintain or return to baseline ADL function  Description: INTERVENTIONS:  -  Assess patient's ability to carry out ADLs; assess patient's baseline for ADL function and identify physical deficits which impact ability to perform ADLs (bathing, care of mouth/teeth, toileting, grooming, dressing, etc.)  - Assess/evaluate cause of self-care deficits   - Assess range of motion  - Assess patient's mobility; develop plan if impaired  - Assess patient's need for assistive devices and provide as appropriate  - Encourage maximum independence but intervene and supervise when necessary  - Involve family in performance of ADLs  - Assess for home care needs following discharge   - Consider OT consult to assist with ADL evaluation and planning for discharge  - Provide patient education as appropriate  Outcome: Progressing  Goal: Maintains/Returns to pre admission functional level  Description:  INTERVENTIONS:  - Perform AM-PAC 6 Click Basic Mobility/ Daily Activity assessment daily.  - Set and communicate daily mobility goal to care team and patient/family/caregiver.   - Collaborate with rehabilitation services on mobility goals if consulted  - Perform Range of Motion 2 times a day.  - Reposition patient every 2 hours.  - Dangle patient 2 times a day  - Stand patient 2 times a day  - Ambulate patient 2 times a day  - Out of bed to chair 3 times a day   - Out of bed for meals 3 times a day  - Out of bed for toileting  - Record patient progress and toleration of activity level   Outcome: Progressing     Problem: DISCHARGE PLANNING  Goal: Discharge to home or other facility with appropriate resources  Description: INTERVENTIONS:  - Identify barriers to discharge w/patient and caregiver  - Arrange for needed discharge resources and transportation as appropriate  - Identify discharge learning needs (meds, wound care, etc.)  - Arrange for interpretive services to assist at discharge as needed  - Refer to Case Management Department for coordinating discharge planning if the patient needs post-hospital services based on physician/advanced practitioner order or complex needs related to functional status, cognitive ability, or social support system  Outcome: Progressing     Problem: Knowledge Deficit  Goal: Patient/family/caregiver demonstrates understanding of disease process, treatment plan, medications, and discharge instructions  Description: Complete learning assessment and assess knowledge base.  Interventions:  - Provide teaching at level of understanding  - Provide teaching via preferred learning methods  Outcome: Progressing     Problem: RESPIRATORY - ADULT  Goal: Achieves optimal ventilation and oxygenation  Description: INTERVENTIONS:  - Assess for changes in respiratory status  - Assess for changes in mentation and behavior  - Position to facilitate oxygenation and minimize respiratory effort  - Oxygen  administered by appropriate delivery if ordered  - Initiate smoking cessation education as indicated  - Encourage broncho-pulmonary hygiene including cough, deep breathe, Incentive Spirometry  - Assess the need for suctioning and aspirate as needed  - Assess and instruct to report SOB or any respiratory difficulty  - Respiratory Therapy support as indicated  Outcome: Progressing     Problem: Nutrition/Hydration-ADULT  Goal: Nutrient/Hydration intake appropriate for improving, restoring or maintaining nutritional needs  Description: Monitor and assess patient's nutrition/hydration status for malnutrition. Collaborate with interdisciplinary team and initiate plan and interventions as ordered.  Monitor patient's weight and dietary intake as ordered or per policy. Utilize nutrition screening tool and intervene as necessary. Determine patient's food preferences and provide high-protein, high-caloric foods as appropriate.     INTERVENTIONS:  - Monitor oral intake, urinary output, labs, and treatment plans  - Assess nutrition and hydration status and recommend course of action  - Evaluate amount of meals eaten  - Assist patient with eating if necessary   - Allow adequate time for meals  - Recommend/ encourage appropriate diets, oral nutritional supplements, and vitamin/mineral supplements  - Order, calculate, and assess calorie counts as needed  - Recommend, monitor, and adjust tube feedings and TPN/PPN based on assessed needs  - Assess need for intravenous fluids  - Provide specific nutrition/hydration education as appropriate  - Include patient/family/caregiver in decisions related to nutrition  Outcome: Progressing     Problem: Prexisting or High Potential for Compromised Skin Integrity  Goal: Skin integrity is maintained or improved  Description: INTERVENTIONS:  - Identify patients at risk for skin breakdown  - Assess and monitor skin integrity  - Assess and monitor nutrition and hydration status  - Monitor labs   -  Assess for incontinence   - Turn and reposition patient  - Assist with mobility/ambulation  - Relieve pressure over bony prominences  - Avoid friction and shearing  - Provide appropriate hygiene as needed including keeping skin clean and dry  - Evaluate need for skin moisturizer/barrier cream  - Collaborate with interdisciplinary team   - Patient/family teaching  - Consider wound care consult   Outcome: Progressing

## 2024-02-12 NOTE — PROGRESS NOTES
Progress Note - Pulmonary   Abdulaziz Gomez 63 y.o. 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  Can resume LMWH today and hold AM dose tomorrow    Suspected COPD w/o AE  PFTs as outpatient  Declines nebulized therapies at this time    Hemoptysis - resolved, continue to monitor    Nicotine dependence - tobacco cessation, NRT    Reported Right SC and Left IJ DVT- starting UFH gtt, d/w Dr. Knapp, will need to be held 0600 for 1000 TPC      Subjective:   63 year old man with COPD unknown severity, chronic cough, tobacco abuse presented from oncology clinic for hypoxia.  Recently diagnosed stage IV NSCLCa with bony and hepatic metastasis. He had large right pleural effusion post 1L thoracentesis on 2/8.    24hrs - notes continued left shoulder pain. Reported weak voice. Notes cough but no sputum production. Has more dyspnea but was improved post thoracentesis, denied pleurisy.  Interested in TPC.      Objective:     Vitals: Blood pressure (!) 155/110, pulse (!) 111, temperature 97.8 °F (36.6 °C), temperature source Oral, resp. rate 16, weight 60.8 kg (134 lb 0.6 oz), SpO2 93%., 6L/min NC, Body mass index is 19.79  kg/m².      Intake/Output Summary (Last 24 hours) at 2/12/2024 1220  Last data filed at 2/12/2024 0515  Gross per 24 hour   Intake --   Output 750 ml   Net -750 ml         Physical Exam  Gen: Older frail man in bed, awake, alert, oriented x 3, no acute distress, weak voice  HEENT: Mucous membranes moist, no oral lesions, no thrush  NECK: No accessory muscle use, JVP not elevated  Cardiac: Regular, single S1, single S2, no murmurs, no rubs, no gallops  Lungs: diminished BS lower 1/3 right lung, no wheeze or rales, no rhonchi, no pleural rubs  Abdomen: normoactive bowel sounds, soft nontender, nondistended, no rebound or rigidity, no guarding  Extremities: no cyanosis, no clubbing, no LE edema, RUE>LUE edema noted    Labs: I have personally reviewed pertinent lab results.  Laboratory and Diagnostics  Results from last 7 days   Lab Units 02/08/24  0532 02/07/24  1802   WBC Thousand/uL  --  8.23   HEMOGLOBIN g/dL  --  12.2   HEMATOCRIT %  --  34.8*   PLATELETS Thousands/uL 240 219   NEUTROS PCT %  --  75   MONOS PCT %  --  9   EOS PCT %  --  0     Results from last 7 days   Lab Units 02/09/24  0533 02/07/24  1802   SODIUM mmol/L 131* 129*   POTASSIUM mmol/L 4.6 4.1   CHLORIDE mmol/L 94* 90*   CO2 mmol/L 28 28   ANION GAP mmol/L 9 11   BUN mg/dL 19 12   CREATININE mg/dL 0.62 0.62   CALCIUM mg/dL 9.4 9.5   GLUCOSE RANDOM mg/dL 142* 107   ALT U/L  --  16   AST U/L  --  25   ALK PHOS U/L  --  84   ALBUMIN g/dL  --  3.8   TOTAL BILIRUBIN mg/dL  --  0.91          Results from last 7 days   Lab Units 02/07/24  1802   INR  1.05   PTT seconds 36                  Results from last 7 days   Lab Units 02/08/24  0532   LD U/L 709*     Right pleural fluid 2/8  TP 3.6, , Diff 88%, cytology pending    Microbiology:  Right pleural fluid 2/8  NGTD    Imaging and other studies: I have personally reviewed pertinent reports.   and I have personally reviewed pertinent films in PACS  Upper Ext US 2/12 - prelim reported R SC vein DVT  and left IJ thrombus    CXR 2/10 - right basilar opacity mixed effusion/atelectasis    CT angio 2/7 - no PE, large right effusion, ground glass infiltrate RML, right hilar tumor encasing distal trachea and right bronchi as well as encasing BC veins and SVC, hepatic metastasis and axillary adenopathy    Alfredo Barrios DO, FACP  [facility]

## 2024-02-12 NOTE — PROGRESS NOTES
Progress Note - Palliative & Supportive Care  Abdulaziz Gomez  63 y.o.  male  S /S -01   MRN: [id number]  Encounter: [id number]     Assessment  Adenocarcinoma of the lung, non-small cell lung cancer  Metastatic disease to Bone  Cancer-related pain - thoracolumbar spine, bilateral shoulders, left anterior thigh  Right Pleural Effusion  Weight Loss, Dysphagia  Palliative Care Patient  Goals of Care Discussion        PLAN:     1. Goals:   Full Code - Level 1  Disease focused care with no limits placed.  Palliative will follow for ongoing goals of care discussions as situation evolves.     2. Social Support:  Supportive listening provided  Normalized experience of patient/family  Provided anxiety containment     3. Symptom management:  Cancer related pain  Currently, worst pain is in left shoulder  Pain worsens with any movement  No IV pain meds since 2/10  OMEs approximately 168mg for 2/11  Further adjustments made to pain regimen due to 24 hour needs with frequent PRN dosing     Continue APAP 975 mg q8 hours ATC  Increase Dilaudid to 8mg q6 hours scheduled with hold parameters  Add toradol 15mg once today  Continue gabapentin 100mg TID - can consider an increase in dose tomorrow as it was started 2/11  Continue PO dilaudid 6 mg q4 hours PRN for moderate to severe pain  Continue IV Dilaudid 0.5 mg q4 hours PRN for BTP  All the above with Hold Parameters for Safety  Narcan on order for concerns for respiratory depression or need for opioid reversal     Constipation - continue bowel regimen  Continue Senokot to 2 tabs BID.  Continue Miralax Daily     4. Follow-up  We appreciate the opportunity to participate in this patient's care.   We will continue to follow. [facility] to follow up in person on 2/13/24  Please do not hesitate to contact our on-call provider through our clinic answering service at [phone] should there be an acute change or other symptom control concerns.     5. Care Coordination  Reviewed  case with Dr. Barrios    24 Hour History  Chart reviewed before visit.    Patient seen with wife and daughter present.  He is alert and oriented to person, place and time.  He is pleasant and in good spirits but reports a very difficult weekend due to pain.  He is exhausted from getting less sleep due to pain.  Feels new regimen (6mg q8h scheduled) has helped but he continues with frequent PRN medication use. Agreeable to and increase and dose of scheduled dilaudid  Also will give a dose of toradol today    Review of Systems   All other systems reviewed and are negative.    Medications    Current Facility-Administered Medications:     acetaminophen (TYLENOL) tablet 975 mg, 975 mg, Oral, Q8H SHANTAL, Jb Shiu, DO, 975 mg at 02/12/24 0513    gabapentin (NEURONTIN) capsule 100 mg, 100 mg, Oral, TID, Guera Hernández MD, 100 mg at 02/11/24 2116    HYDROmorphone (DILAUDID) injection 0.5 mg, 0.5 mg, Intravenous, Q4H PRN, Jb Shiu, DO, 0.5 mg at 02/10/24 1223    HYDROmorphone (DILAUDID) tablet 6 mg, 6 mg, Oral, Q8H SHANTAL, Jb Shiu, DO, 6 mg at 02/12/24 0513    HYDROmorphone (DILAUDID) tablet 6 mg, 6 mg, Oral, Q4H PRN **OR** HYDROmorphone (DILAUDID) tablet 8 mg, 8 mg, Oral, Q4H PRN, Jb Shiu, DO, 8 mg at 02/12/24 0143    ipratropium (ATROVENT) 0.02 % inhalation solution 0.5 mg, 0.5 mg, Nebulization, TID, Guera Hernández MD, 0.5 mg at 02/12/24 0710    levalbuterol (XOPENEX) inhalation solution 1.25 mg, 1.25 mg, Nebulization, TID, Guera Hernández MD, 1.25 mg at 02/12/24 0710    lidocaine (LIDODERM) 5 % patch 2 patch, 2 patch, Topical, Daily, Guera Hernández MD, 2 patch at 02/11/24 0851    menthol-methyl salicylate (BENGAY) 10-15 % cream, , Apply externally, TID, Guera Hernández MD, Given at 02/11/24 0854    naloxone (NARCAN) 0.04 mg/mL syringe 0.04 mg, 0.04 mg, Intravenous, Q1MIN PRN, Jb Maynard DO    nicotine (NICODERM CQ) 14 mg/24hr TD 24 hr patch 14 mg, 14 mg, Transdermal, Daily,  Guera Hernández MD, 14 mg at 02/11/24 0850    polyethylene glycol (MIRALAX) packet 17 g, 17 g, Oral, Q12H, Guera Hernández MD, 17 g at 02/11/24 0850    senna-docusate sodium (SENOKOT S) 8.6-50 mg per tablet 2 tablet, 2 tablet, Oral, BID, Jb Maynard, DO, 2 tablet at 02/11/24 0851    Objective  BP (!) 155/110 (BP Location: Right arm)   Pulse (!) 111   Temp 97.8 °F (36.6 °C) (Oral)   Resp 16   Wt 60.8 kg (134 lb 0.6 oz) Comment: pt O2 was 84 laying in bed so nurse didn't want to get him up  SpO2 93%   BMI 19.79 kg/m²   Physical Exam  Vitals and nursing note reviewed.   Constitutional:       General: He is awake. He is not in acute distress.     Appearance: He is not toxic-appearing.   HENT:      Head: Normocephalic and atraumatic.      Right Ear: External ear normal.      Left Ear: External ear normal.   Eyes:      General: No scleral icterus.        Right eye: No discharge.         Left eye: No discharge.      Extraocular Movements: Extraocular movements intact.      Conjunctiva/sclera: Conjunctivae normal.      Pupils: Pupils are equal, round, and reactive to light.   Cardiovascular:      Rate and Rhythm: Tachycardia present.   Pulmonary:      Effort: Pulmonary effort is normal. No tachypnea, bradypnea, accessory muscle usage or respiratory distress.   Abdominal:      General: There is no distension.   Musculoskeletal:      Cervical back: Normal range of motion.      Right lower leg: No edema.      Left lower leg: No edema.   Skin:     General: Skin is dry.      Coloration: Skin is not pale.   Neurological:      Mental Status: He is alert and oriented to person, place, and time.      Cranial Nerves: No dysarthria or facial asymmetry.   Psychiatric:         Attention and Perception: Attention normal.         Mood and Affect: Mood and affect normal.         Speech: Speech normal.         Behavior: Behavior normal. Behavior is cooperative.         Thought Content: Thought content normal.          Cognition and Memory: Cognition and memory normal.         Judgment: Judgment normal.       Lab Results: I have personally reviewed pertinent labs.,   Imaging Studies: I have personally reviewed pertinent reports.  XR chest portable  Result Date: 2/11/2024  Impression: 1.  Stable right basilar opacity likely representing a combination of atelectasis and pleural effusion. 2.  Mild colonic distention with right-sided stool.     MRI lumbar spine w wo contrast  Result Date: 2/10/2024  Impression: Redemonstration of diffuse osseous metastatic disease. No compression of the conus or cauda equina. Slight loss of height at L1 with mild epidural disease without significant canal stenosis. Extraosseous disease arising from the left sacral metastasis effacing the left lateral recess S1 and encroaching on the left S1 foramen as well as foraminal extension into the foramen at L5-S1 Tiny enhancing intradural extramedullary nodule along the conus at T12. Metastatic disease not excluded but this could also represent incidental nerve sheath tumor. Recommend follow-up and/or consider correlation with CSF. Study marked in epic for notification.     MRI thoracic spine w wo contrast  Result Date: 2/10/2024  Impression: Redemonstration of diffuse osseous metastatic disease. Pathologic compression fractures C7-T2. Subtle L1 compression. Posterior bowing of the cortex/mild epidural disease most pronounced at C7-T1 with mild canal stenosis. No cord compression. 2 mm enhancing intradural extramedullary nodule at T12. Metastasis not excluded but this could represent incidental nerve sheath tumor.     MRI cervical spine w wo contrast  Result Date: 2/10/2024  Impression: Redemonstration of diffuse osseous metastatic disease. Pathologic compression fractures with severe loss of height at C7 and mild to moderate compression at T1 and T2. Posterior bowing of the cortex/mild ventral epidural disease at C7-T1 causing mild canal stenosis. No cord  "compression. Degenerative spondylosis as above with mild canal stenosis and multilevel foraminal stenosis. Superimposed extraosseous tumor into the left C6-7 foramen contributing to severe foraminal stenosis.     EKG, Pathology, and Other Studies: I have personally reviewed pertinent reports.    Counseling / Coordination of Care  Total floor / unit time spent today 40 minutes. Greater than 50% of total time was spent with the patient and / or family counseling and / or coordinating of care. A description of the counseling / coordination of care: Chart reviewed, provided medical updates, determined goals of care, discussed palliative care and symptom management, discussed code status, discussed comfort care and hospice, provided anticipatory guidance, determined competency and POA/HCA, determined social/family support, provided psychosocial support.       Jennifer P. Bloch, MSN, CRNP, [facility]  Palliative & Supportive Care    Portions of this document may have been created using dictation software and as such some \"sound alike\" terms may have been generated by the system. Do not hesitate to contact me with any questions or clarifications.    "

## 2024-02-12 NOTE — PROGRESS NOTES
"[facility]  Progress Note  Name: Abdulaziz Gomez I  MRN: [id number]  Unit/Bed#: S -01 I Date of Admission: 2/7/2024   Date of Service: 2/12/2024 I Hospital Day: 5    Assessment/Plan   * Acute respiratory failure with hypoxia (HCC)  Assessment & Plan  Acute hypoxic respiratory failure  Multifactorial  Patient noted to have right pleural effusion, emphysematous lungs, lung cancer encasing distal trachea right bronchi, SVC  Continue supplemental oxygen wean as tolerated to keep O2 sats more than 90 to 92%  Monitor ambulatory O2 sats  Encourage incentive spirometry      Pleural effusion on right  Assessment & Plan  Large right pleural effusion noted on CT chest  Status post thoracocentesis per pulmonary  Pulmonary plans indwelling pleural catheter placement given recurrence of pleural effusion noted; as per discussion with pulmonologist, this will be done tomorrow, 2/13, at 10 AM.  Thus, as per discussion with pulmonologist, heparin drip was ordered to be held off at 6 AM tomorrow, 2/13.  Pulmonary following  Follow-up on cytology    Metastatic non-small cell lung cancer (HCC)  Assessment & Plan  Patient recently diagnosed with 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  CTA PE study : \"Redemonstration of tumor encasing the distal trachea and right bronchi extending into the anterior mediastinum and lower neck bilaterally encasing and narrowing of the brachiocephalic veins and SVC and occlusion of the right bronchi. Mild bilateral axillary lymphadenopathy, possibly metastatic. Liver metastases better shown on prior study.\"  Radiation oncology plans to initiate radiotherapy noted  Oncology plans for imaging and close outpatient follow-up for initiating chemotherapy noted  Analgesics, supportive cares  Outpatient oncology follow-up    Acute deep vein thrombosis (DVT) of both upper extremities (HCC)  Assessment & Plan  Today, patient was found to have significant " swelling on his right upper extremity, thus, duplex scan was done.  Duplex scan results: Acute, nonocclusive DVT in the right subclavian vein, with superficial thrombophlebitis in the right cephalic vein; acute nonocclusive DVT of the left internal jugular and subclavian veins.  Due to these findings, heparin drip VTE protocol started today.  Discussed with pulmonologist as there is a plan of Asept catheter placement tomorrow.  Thus, heparin drip will be put on hold at 6 AM tomorrow for the asept catheter placement at 10 AM tomorrow, 2/13.  Patient's nurse was informed about this.  Medical and radiation oncologists were informed about this finding.    Left shoulder pain  Assessment & Plan  Left shoulder pain, pain radiating to little finger  Cervical spine reveals features of cervical spine stenosis metastatic disease possibly contributing to symptoms  Add gabapentin  Analgesics, supportive cares  Patient is planned for radiation therapy  Left shoulder x-ray: No acute osseous abnormality.  Spoke to palliative care advanced practitioner and main some adjustments in patient's pain management.  Physical therapy    Severe protein-calorie malnutrition (HCC)  Assessment & Plan       Body mass index is 19.79 kg/m².   Encourage protein and calorie intake    COPD (chronic obstructive pulmonary disease) (HCC)  Assessment & Plan  No documented PFTs on file  Continue respiratory treatments  Pulm inputs noted  Supportive cares    Hyponatremia  Assessment & Plan  Multifactorial  Monitor    Cancer related pain  Assessment & Plan  Imaging studies show extensive metastatic disease including bones  Opiate analgesics with palliative care  Palliative care following  Supportive cares    Current every day smoker  Assessment & Plan  Tobacco cessation discussed and strongly encouraged             VTE Pharmacologic Prophylaxis: VTE Score: 6 High Risk (Score >/= 5) - Pharmacological DVT Prophylaxis Ordered: heparin drip. Sequential  Compression Devices Ordered.    Mobility:   Basic Mobility Inpatient Raw Score: 17  JH-HLM Goal: 5: Stand one or more mins  JH-HLM Achieved: 6: Walk 10 steps or more  HLM Goal achieved. Continue to encourage appropriate mobility.    Patient Centered Rounds: I performed bedside rounds with nursing staff today.   Discussions with Specialists or Other Care Team Provider: Pulmonologist.  Advance practitioner for palliative care.  Medical and radiation oncologists.  Case management.    Education and Discussions with Family / Patient: Updated  (wife and daughter) at bedside.    Total Time Spent on Date of Encounter in care of patient: Time was spent on one or more of the following: performing physical exam; counseling and coordination of care; obtaining or reviewing history; documenting in the medical record; reviewing/ordering tests, medications or procedures; communicating with other healthcare professionals and discussing with patient's family/caregivers.    Current Length of Stay: 5 day(s)  Current Patient Status: Inpatient   Certification Statement: The patient will continue to require additional inpatient hospital stay due to findings and plans.  Discharge Plan: Anticipate discharge in 48-72 hrs to home with home services.    Code Status: Level 1 - Full Code    Subjective:   Patient was seen and examined this morning.  I again visited the patient with the pulmonologist when patient was found to have acute DVTs.  Patient complains of left shoulder pains as well as right upper extremity swelling.  Still has occasional shortness of breath and cough.  Otherwise no other complaints.      Objective:     Vitals:   Temp (24hrs), Av.7 °F (36.5 °C), Min:97.6 °F (36.4 °C), Max:97.8 °F (36.6 °C)    Temp:  [97.6 °F (36.4 °C)-97.8 °F (36.6 °C)] 97.6 °F (36.4 °C)  HR:  [105] 105  Resp:  [16] 16  BP: (155-167)/() 167/83  SpO2:  [91 %-94 %] 91 %  Body mass index is 19.79 kg/m².     Input and Output Summary  (last 24 hours):     Intake/Output Summary (Last 24 hours) at 2/12/2024 1756  Last data filed at 2/12/2024 0515  Gross per 24 hour   Intake --   Output 400 ml   Net -400 ml       Physical Exam:   Physical Exam  Vitals and nursing note reviewed. Exam conducted with a chaperone present.   Constitutional:       General: He is not in acute distress.     Appearance: He is ill-appearing. He is not toxic-appearing or diaphoretic.   Cardiovascular:      Rate and Rhythm: Normal rate and regular rhythm.   Pulmonary:      Effort: Pulmonary effort is normal. No respiratory distress.      Breath sounds: No stridor. Rhonchi present. No wheezing or rales.   Abdominal:      General: Bowel sounds are normal. There is no distension.      Palpations: Abdomen is soft.      Tenderness: There is no abdominal tenderness.   Musculoskeletal:         General: Swelling and tenderness present.      Right lower leg: No edema.      Left lower leg: No edema.      Comments: Positive for swelling of the right upper extremity.  Positive for tenderness on patient's left shoulder area, however no erythema, abnormal warmth, or any signs of injuries like hematomas, ecchymosis, wounds, noted.   Skin:     General: Skin is warm.      Coloration: Skin is not pale.      Findings: No erythema or rash.   Neurological:      Mental Status: He is alert and oriented to person, place, and time. Mental status is at baseline.   Psychiatric:         Mood and Affect: Mood normal.         Behavior: Behavior normal.         Thought Content: Thought content normal.            Additional Data:     Labs:  Results from last 7 days   Lab Units 02/08/24  0532 02/07/24  1802   WBC Thousand/uL  --  8.23   HEMOGLOBIN g/dL  --  12.2   HEMATOCRIT %  --  34.8*   PLATELETS Thousands/uL 240 219   NEUTROS PCT %  --  75   LYMPHS PCT %  --  14   MONOS PCT %  --  9   EOS PCT %  --  0     Results from last 7 days   Lab Units 02/09/24  0533 02/07/24  1802   SODIUM mmol/L 131* 129*    POTASSIUM mmol/L 4.6 4.1   CHLORIDE mmol/L 94* 90*   CO2 mmol/L 28 28   BUN mg/dL 19 12   CREATININE mg/dL 0.62 0.62   ANION GAP mmol/L 9 11   CALCIUM mg/dL 9.4 9.5   ALBUMIN g/dL  --  3.8   TOTAL BILIRUBIN mg/dL  --  0.91   ALK PHOS U/L  --  84   ALT U/L  --  16   AST U/L  --  25   GLUCOSE RANDOM mg/dL 142* 107     Results from last 7 days   Lab Units 02/07/24  1802   INR  1.05     Results from last 7 days   Lab Units 02/07/24  0752   POC GLUCOSE mg/dl 83               Lines/Drains:  Invasive Devices       Peripheral Intravenous Line  Duration             Peripheral IV 02/12/24 Dorsal (posterior);Right Forearm <1 day                          Imaging: Reviewed radiology reports from this admission including: chest xray, chest CT scan, MRI spine, and xray(s)    Recent Cultures (last 7 days):   Results from last 7 days   Lab Units 02/08/24  0937   GRAM STAIN RESULT  2+ Polys  No bacteria seen   BODY FLUID CULTURE, STERILE  No growth       Last 24 Hours Medication List:   Current Facility-Administered Medications   Medication Dose Route Frequency Provider Last Rate    acetaminophen  975 mg Oral Q8H SHANTAL Jb Olverau, DO      gabapentin  100 mg Oral TID Guera Hernández MD      heparin (porcine)  3-30 Units/kg/hr (Order-Specific) Intravenous Titrated Afsaneh Knapp MD 18 Units/kg/hr (02/12/24 1305)    heparin (porcine)  2,400 Units Intravenous Q6H PRN Afsaneh Knapp MD      heparin (porcine)  4,800 Units Intravenous Q6H PRN Afsaneh Knapp MD      HYDROmorphone  0.5 mg Intravenous Q4H PRN Jb Maynard, DO      HYDROmorphone  6 mg Oral Q4H PRN Jennifer Paige Bloch, CRNP      HYDROmorphone  8 mg Oral Q6H SCH Jennifer Paige Bloch, CRNP      ipratropium  0.5 mg Nebulization TID Guera Hernández MD      levalbuterol  1.25 mg Nebulization TID Guera Hernández MD      lidocaine  2 patch Topical Daily Guera Hernández MD      menthol-methyl salicylate   Apply  externally TID Guera Hernández MD      naloxone  0.04 mg Intravenous Q1MIN PRN Jb Maynard DO      nicotine  14 mg Transdermal Daily Guera Hernández MD      polyethylene glycol  17 g Oral Q12H Guera Hernández MD      senna-docusate sodium  2 tablet Oral BID Jb Maynard DO          Today, Patient Was Seen By: Afsaneh Knapp MD    **Please Note: This note may have been constructed using a voice recognition system.**

## 2024-02-12 NOTE — PHYSICAL THERAPY NOTE
"       PHYSICAL THERAPY EVALUATION/TREATMENT    NAME:  Abdulaziz Gomez  AGE:   63 y.o.  Mrn:   [id number]  Length Of Stay: 5    ADMIT DX:  Pleural effusion [J90]  Hypoxia [R09.02]  Metastatic disease (HCC) [C79.9]  Intractable pain [R52]    Past Medical History:   Diagnosis Date    Metastatic non-small cell lung cancer (HCC)      Past Surgical History:   Procedure Laterality Date    IR BIOPSY BONE  1/23/2024    TONSILLECTOMY Bilateral        Performed at least 2 patient identifiers during session: Name, Birthday, and ID bracelet       02/12/24 1425   PT Last Visit   PT Visit Date 02/12/24   Note Type   Note type Evaluation   Pain Assessment   Pain Assessment Tool 0-10   Pain Score   (8-9/10)   Pain Location/Orientation Orientation: Left;Location: Shoulder   Pain Onset/Description Onset: Ongoing;Frequency: Constant/Continuous   Effect of Pain on Daily Activities Limits comfort and mobility   Patient's Stated Pain Goal No pain   Restrictions/Precautions   Other Precautions Pain;Fall Risk;Bed Alarm;Chair Alarm;O2  (6 L O2 via NC; IV; Kem)   Home Living   Type of Home House   Home Layout One level;Able to live on main level with bedroom/bathroom;Performs ADLs on one level;Stairs to enter with rails  (6 steps to enter with BHR)   Bathroom Shower/Tub Tub/shower unit   Bathroom Toilet Standard   Bathroom Equipment   (\"Towel bar in shower\")   Bathroom Accessibility Accessible   Home Equipment Cane;Crutches   Prior Function   Level of [place] Independent with ADLs;Independent with functional mobility;Independent with IADLS   Lives With Spouse   Receives Help From Family   IADLs Independent with driving;Independent with meal prep;Independent with medication management   Falls in the last 6 months 0  (Denies)   Vocational Full time employment  ()   Comments Patient normally ambulates without an assistive device prior to admission   General   Additional Pertinent History Patient is admitted with shortness of " "breath and intractable pain. Patient recently diagnosed with 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 MRI C-spine:Redemonstration of diffuse osseous metastatic disease. Pathologic compression fractures with severe loss of height at C7 and mild to moderate compression at T1 and T2. Posterior bowing of the cortex/mild ventral epidural disease at C7-T1 causing mild canal stenosis. No cord compression.  Degenerative spondylosis as above with mild canal stenosis and multilevel foraminal stenosis.  Superimposed extraosseous tumor into the left C6-7 foramen contributing to severe foraminal stenosis. MRI T-spine:Redemonstration of diffuse osseous metastatic disease. Pathologic compression fractures C7-T2. Subtle L1 compression. Posterior bowing of the cortex/mild epidural disease most pronounced at C7-T1 with mild canal stenosis. No cord compression.  2 mm enhancing intradural extramedullary nodule at T12. Metastasis not excluded but this could represent incidental nerve sheath tumor.  MRI L-spine:Redemonstration of diffuse osseous metastatic disease. No compression of the conus or cauda equina.  Slight loss of height at L1 with mild epidural disease without significant canal stenosis.  Extraosseous disease arising from the left sacral metastasis effacing the left lateral recess S1 and encroaching on the left S1 foramen as well as foraminal extension into the foramen at L5-S1  Tiny enhancing intradural extramedullary nodule along the conus at T12. Metastatic disease not excluded but this could also represent incidental nerve sheath tumor.   Family/Caregiver Present Yes  (Spouse)   Cognition   Overall Cognitive Status WFL   Arousal/Participation Cooperative   Orientation Level Oriented X4   Memory Within functional limits   Following Commands Follows multistep commands without difficulty   Subjective   Subjective Patient complaining of pain to left shoulder 8-9/10.  States \"if it was not for this " "pain I be okay\"   RLE Assessment   RLE Assessment WFL  (4 -/5 grossly)   LLE Assessment   LLE Assessment WFL  (4 -/5 grossly)   Vision-Basic Assessment   Current Vision Wears glasses only for reading   Bed Mobility   Supine to Sit 5  Supervision  (To patient's right side)   Additional items Assist x 1;Verbal cues;Increased time required;HOB elevated;Bedrails   Transfers   Sit to Stand 4  Minimal assistance  (CGA)   Additional items Assist x 1;Verbal cues;Increased time required  (Cues for safe hand placement)   Stand to Sit 4  Minimal assistance  (CGA)   Additional items Armrests;Assist x 1;Verbal cues;Increased time required  (Cues for safe hand placement)   Ambulation/Elevation   Gait pattern Excessively slow;Short stride;Step to;Foward flexed;Decreased foot clearance  (Decreased weightbearing to left upper extremity due to pain; multiple, brief standing rest breaks)   Gait Assistance 4  Minimal assist  (CGA progressing to close supervision)   Additional items Assist x 1;Verbal cues;Tactile cues   Assistive Device Rolling walker   Distance 15 feet with change in direction; SaO2 ranging from 88/89% to % with rest and activity on 6 L O2; heart rate from the low to mid teens to high 130s.  Patient needing increased recovery time following short distance ambulation with a roller walker.   Balance   Static Sitting Good   Dynamic Sitting Fair +   Static Standing   (Fair/fair plus with roller walker)   Ambulatory   (Fair to occasional F- with roller walker)   Endurance Deficit   Endurance Deficit Yes   Endurance Deficit Description Limited overall activity endurance   Activity Tolerance   Activity Tolerance Patient limited by fatigue;Patient limited by pain;Treatment limited secondary to medical complications (Comment)  (Shortness of breath with limited activity)   Medical Staff Made Aware CHACHO Rousseau   Assessment   Problem List Decreased strength;Decreased endurance;Impaired balance;Decreased mobility;Decreased " safety awareness;Pain   Assessment Patient seen for Physical Therapy evaluation. Patient admitted with Acute respiratory failure with hypoxia (HCC).  Comorbidities affecting patient's physical performance include: Metastatic non-small lung cancer, COPD, severe protein calorie malnutrition, plantar fasciitis, chronic cough, chronic neck pain.  Personal factors affecting patient at time of initial evaluation include: lives in 1 story house, stairs to enter home, inability to navigate community distances, inability to navigate level surfaces without external assistance, and inability to perform dynamic tasks in community. Prior to admission, patient was independent with functional mobility without assistive device, independent with ADLS, independent with IADLS, living with spouse in a 1 level home with 6 steps to enter, ambulating household distance, ambulating community distances, and works full time.  Please find objective findings from Physical Therapy assessment regarding body systems outlined above with impairments and limitations including weakness, impaired balance, decreased endurance, impaired coordination, gait deviations, pain, decreased activity tolerance, decreased functional mobility tolerance, decreased safety awareness, fall risk, and SOB upon exertion.  The Barthel Index was used as a functional outcome tool presenting with a score of Barthel Index Score: 50 today indicating marked limitations of functional mobility and ADLS.  Patient's clinical presentation is currently unstable/unpredictable as seen in patient's presentation of vital sign response, changing level of pain, increased fall risk, new onset of impairment of functional mobility, decreased endurance, and new onset of weakness. Pt would benefit from continued Physical Therapy treatment to address deficits as defined above and maximize level of functional mobility. As demonstrated by objective findings, the assigned level of complexity for  this evaluation is high.    The patient's [facility] Basic Mobility Inpatient Short Form Raw Score is 17. A Raw score of greater than 16 suggests the patient may benefit from discharge to home. Please also refer to the recommendation of the Physical Therapist for safe discharge planning.   Goals   Patient Goals Less pain and go home   STG Expiration Date 02/22/24   Short Term Goal #1 Patient will: Increase bilateral LE strength 1/2 grade to facilitate independent mobility, Perform all bed mobility tasks independently to improve pt's independence w/ repositioning for decrease risk of skin breakdown, Perform all transfers independently consistently from various height surfaces in order to improve I w/ engagement w/ real-world environments/situations, Ambulate at least 100 ft. with least restrictive assistive device independently w/o LOB to facilitate return and engagement w/ previous living environment, Navigate 6 stairs w/ supervision with bilateral handrails to either improve independence w/ entering home and/or so patient can fully access living areas in home, Increase ambulatory and static and dynamic standing balance 1 grade to decrease risk for falls, Tolerate at least 15 consecutive minutes of activity to demonstrate improved activity tolerance and endurance, and Tolerate 3 hr OOB to faciliate upright tolerance   Plan   Treatment/Interventions ADL retraining;Functional transfer training;LE strengthening/ROM;Elevations;Therapeutic exercise;Endurance training;Patient/family training;Equipment eval/education;Bed mobility;Gait training;Compensatory technique education;Spoke to case management;OT   PT Frequency 3-5x/wk   Discharge Recommendation   Rehab Resource Intensity Level, PT III (Minimum Resource Intensity)   Equipment Recommended Walker   Walker Package Recommended Wheeled walker   [facility] Basic Mobility Inpatient   Turning in Flat Bed Without Bedrails 3   Lying on Back to Sitting on Edge of Flat Bed Without  Bedrails 3   Moving Bed to Chair 3   Standing Up From Chair Using Arms 3   Walk in Room 3   Climb 3-5 Stairs With Railing 2   Basic Mobility Inpatient Raw Score 17   Basic Mobility Standardized Score 39.67   Highest Level Of Mobility   -[facility] Goal 5: Stand one or more mins   -HLM Achieved 6: Walk 10 steps or more   Barthel Index   Feeding 5   Bathing 0   Grooming Score 0   Dressing Score 5   Bladder Score 10   Bowels Score 10   Toilet Use Score 5   Transfers (Bed/Chair) Score 10   Mobility (Level Surface) Score 0   Stairs Score 5   Barthel Index Score 50   Additional Treatment Session   Start Time 1425   End Time 1442   Treatment Assessment Patient requested to return back to bed due to pain.  Patient transfers sit to stand with CGA and cues for safe hand placement and ambulates with minimal handhold assist 15 feet with change in direction to the bed.  Patient requested to try without the roller walker.  Patient transfers stand to sit with CGA and returns to supine with CGA.  Patient fatigued after short distance ambulation, SaO2 ranging from 88% to 100% with rest and limited activity on 6 L O2.  Patient needing increased recovery time following second trial of ambulation.  A: patient with limited tolerance to PT evaluation and treatment due to left shoulder pain, low endurance, shortness of breath and decreasing SaO2.  While admitted, patient will continue to benefit from skilled physical therapy services to increase his strength, balance, endurance, functional mobility and safe gait.  At this time, patient will benefit from continued ambulation with a roller walker with progression as able/tolerated.  When medically stable for discharge, patient is appropriate for Level III Minimum Resource Intensity.   End of Consult   Patient Position at End of Consult Supine;All needs within reach;Bed/Chair alarm activated   Licensure   [place] License Number  Omayra Donato PT   Portions of the documentation may have been  created using voice recognition software. Occasional wrong word or sound alike substitutions may have occurred due to the inherent limitation of the voice recognition software. Read the chart carefully and recognize, using context, where substitutions have occurred.

## 2024-02-12 NOTE — OCCUPATIONAL THERAPY NOTE
Occupational Therapy Evaluation     Patient Name: Abdulaziz Gomez  Today's Date: 2/12/2024  Problem List  Principal Problem:    Acute respiratory failure with hypoxia (HCC)  Active Problems:    Current every day smoker    Metastatic non-small cell lung cancer (HCC)    Cancer related pain    Palliative care patient    Pleural effusion on right    Hyponatremia    COPD (chronic obstructive pulmonary disease) (HCC)    Severe protein-calorie malnutrition (HCC)    Left shoulder pain    Past Medical History  Past Medical History:   Diagnosis Date    Metastatic non-small cell lung cancer (HCC)      Past Surgical History  Past Surgical History:   Procedure Laterality Date    IR BIOPSY BONE  1/23/2024    TONSILLECTOMY Bilateral          02/12/24 1443   OT Last Visit   OT Visit Date 02/12/24   Note Type   Note type Evaluation   Pain Assessment   Pain Assessment Tool 0-10   Pain Score   (8-9/10 per pt)   Pain Location/Orientation Orientation: Left;Location: Shoulder   Pain Onset/Description Onset: Ongoing;Descriptor: Aching   Effect of Pain on Daily Activities limits comfort, activity tolerance, speed of ADLs/mobility, weight bearing on RW   Patient's Stated Pain Goal No pain   Hospital Pain Intervention(s) Repositioned;Ambulation/increased activity;Elevated;Emotional support   Restrictions/Precautions   Weight Bearing Precautions Per Order No   Other Precautions Chair Alarm;Bed Alarm;Multiple lines;O2;Fall Risk;Pain  (6L O2 NC, IV)   Home Living   Type of Home House   Home Layout One level;Performs ADLs on one level;Able to live on main level with bedroom/bathroom  (6 NOY)   Bathroom Shower/Tub Tub/shower unit   Bathroom Toilet Standard   Bathroom Equipment   (none per pt)   Bathroom Accessibility Accessible   Home Equipment Cane;Crutches   Prior Function   Level of [place] Independent with ADLs;Independent with functional mobility;Independent with IADLS   Lives With Spouse  (who is available 24/7)   Receives Help  "From Family   IADLs Independent with meal prep;Independent with medication management;Independent with driving   Falls in the last 6 months 0   Vocational Full time employment  (was working as an  up until few weeks ago)   Comments Pt is fully (I) PTA, does not use AD. Wife is available to assist PRN.   Lifestyle   Autonomy Pt lives w/ spouse in a 1 level house and is fully (I) PTA, no AD, (-) falls, (+)    Reciprocal Relationships Supportive spouse who is available 24/7   Service to Others Full-time employment working as an  (physically intense)   Intrinsic Gratification Pt enjoys his work, woodworking in his spare time making cribs/cradles, furniture   General   Additional Pertinent History Pt admitted w/ SOB and multiple areas of pain. Found to have widespread osseous metastases (innumerable bony lesions, including lesions in the cervical, thoracic, and lumbosacral spine, pelvis, and femur). Currently on 6L O2 NC for lung adenocarcinoma, pleural effusion and respiratory failure.   Family/Caregiver Present Yes  (Spouse)   Additional General Comments PMH includes: tobacco abuse, COPD, severe protein-calorie deficiency, widespread CA.   Subjective   Subjective \"I'd be fine if it wasn't for this arm pain\"   ADL   Where Assessed Edge of bed   Eating Assistance 7  Independent   Grooming Assistance 6  Modified Independent   UB Bathing Assistance 6  Modified Independent   LB Bathing Assistance 5  Supervision/Setup   UB Dressing Assistance 6  Modified independent   LB Dressing Assistance 5  Supervision/Setup   LB Dressing Deficit Setup;Supervision/safety;Increased time to complete;Thread RLE into pants;Thread LLE into pants;Don/doff L sock;Don/doff R sock  (don socks supine in bed, pants sitting EOB w/ pacing)   Toileting Assistance  5  Supervision/Setup   Bed Mobility   Supine to Sit 5  Supervision   Additional items Assist x 1;HOB elevated;Increased time required;Verbal cues   Sit to Supine 5  " Supervision   Additional items Assist x 1;HOB elevated;Increased time required;Verbal cues   Additional Comments Able to sit EOB w/ S, increased SOB and pain. Returned to recliner at end of session   Transfers   Sit to Stand 4  Minimal assistance  (CGA/close S)   Additional items Assist x 1;Increased time required;Verbal cues   Stand to Sit 4  Minimal assistance   Additional items Assist x 1;Increased time required;Verbal cues  (CGA/close S)   Additional Comments VC for pacing/hand placement   Functional Mobility   Functional Mobility 4  Minimal assistance   Additional Comments Initially close S/CGA w/ RW short distance to recliner, min A HHA on return trip. Guarded by pain, intermittent standing rest breaks. Limited by SOB/fatigue   Additional items Hand hold assistance   Balance   Static Sitting Good   Dynamic Sitting Fair +   Static Standing Fair   Dynamic Standing Fair   Activity Tolerance   Activity Tolerance Patient limited by fatigue;Patient limited by pain  (SOB, generalized weakness)   Medical Staff Made Aware Care coordinated w/ PT Tory   Nurse Made Aware yes, RN Kinga kirk to see pt and updated   RUE Assessment   RUE Assessment WFL   LUE Assessment   LUE Assessment X  (ROM generally WFL, limited by pain in shoulder (MMT not tested); limited ability to grasp and push RW due to pain)   Hand Function   Gross Motor Coordination Functional   Fine Motor Coordination Functional   Hand Function Comments R handed   Sensation   Light Touch No apparent deficits   Vision-Basic Assessment   Current Vision Wears glasses only for reading   Cognition   Overall Cognitive Status WFL   Arousal/Participation Alert;Cooperative   Attention Within functional limits   Orientation Level Oriented X4   Memory Within functional limits   Following Commands Follows multistep commands with increased time or repetition   Comments Extremely pleasant, motivated. Cues for pacing, safety. VERY softspoken   Assessment   Limitation Decreased  ADL status;Decreased endurance;Decreased self-care trans;Decreased high-level ADLs  (pain, balance, fxnl mobility, act glenn, fxnl reach, standing glenn, and strength, pacing)   Prognosis Fair   Assessment Pt is a 63 y.o. male seen for OT evaluation s/p admit to [facility] on 2/7/2024 w/Acute respiratory failure with hypoxia (HCC). Prior to admission, pt was living with spouse in a 1 level house, (I) with ADLs, (I) with IADLs, (-) falls, (+) . Personal and environmental factors affecting patient at time of evaluation include: new cancer diagnosis w/ widespread mets and associated pain, difficulty completing ADLs and difficulty completing IADLs. Personal factors supporting patient at time of evaluation include age, fully (I) PLOF, supportive spouse, attitude towards recovery, and FFSU. Based upon this evaluation, pt is functioning below baseline. Pt will benefit from continued skilled inpatient OT to maximize safety, level of independence overall performance in ADLs, functional transfers, functional mobility to return to functional baseline/highest level of function.   Goals   Patient Goals to have less pain and go home   LTG Time Frame 10-14   Long Term Goal #1 see goals below   Plan   Treatment Interventions ADL retraining;Functional transfer training;Endurance training;Patient/family training;Equipment evaluation/education;Compensatory technique education;Continued evaluation;Energy conservation   Goal Expiration Date 02/22/24   OT Treatment Day 0   OT Frequency 2-3x/wk   Discharge Recommendation   Rehab Resource Intensity Level, OT III (Minimum Resource Intensity)  (increased social support for ADL/IADL completion)   Equipment Recommended Bedside commode;Shower/Tub chair with back ($)  (hospital bed)   Commode Type Standard   Additional Comments  Pt seen as a co-eval with PT due to the patient's co-morbidities and clinically unstable presentation indicated by chart review. During session, pt benefited  from two skilled therapists due to extensive physical assistance to achieve transitional movements and pt's decreased activity tolerance.   Additional Comments 2 The patient's raw score on the AM-PAC Daily Activity Inpatient Short Form is 19. A raw score of greater than or equal to 19 suggests the patient may benefit from discharge to home. Please refer to the recommendation of the Occupational Therapist for safe discharge planning.   AM-PAC Daily Activity Inpatient   Lower Body Dressing 3   Bathing 2   Toileting 3   Upper Body Dressing 3   Grooming 4   Eating 4   Daily Activity Raw Score 19   Daily Activity Standardized Score (Calc for Raw Score >=11) 40.22   AM-PAC Applied Cognition Inpatient   Following a Speech/Presentation 4   Understanding Ordinary Conversation 4   Taking Medications 3   Remembering Where Things Are Placed or Put Away 4   Remembering List of 4-5 Errands 3   Taking Care of Complicated Tasks 3   Applied Cognition Raw Score 21   Applied Cognition Standardized Score 44.3   End of Consult   Patient Position at End of Consult Supine;Bed/Chair alarm activated;All needs within reach   Nurse Communication Nurse aware of consult     GOALS:    *Goals established to promote patient goal of to have less pain and go home:      *LB ADL with mod (I) using AE prn for inc'd independence with self care    *Toileting with mod (I) for clothing management and hygiene to increase hygiene/thoroughness in order to reduce caregiver burden    *Patient will verbalize and demonstrate use of energy conservation/deep breathing techniques and work simplification skills during functional activities with no verbal cues.     *ADL transfers with (I) for inc'd independence with ADLs/purposeful tasks    *Bed mobility- (I) for inc'd independence to manage own comfort and initiate EOB & OOB purposeful tasks    *Patient will increase functional mobility to and from bathroom with rolling walker independently to increase independence  with toileting    *Patient will increase OOB/sitting tolerance to 2-4 hours per day to increase participation in self-care and leisure tasks with no s/s of exertion.     *Patient will improve functional activity tolerance to 15 minutes of sustained functional tasks to increase participation in basic self-care and decrease assistance level.     *Caregiver will demonstrate good body mechanics and safe technique when assisting pt during functional ADLs/transfers to maximize safety upon DC.     ELVA Ramirez, OTR/L    [place] License [id number]  [place] License [id number]

## 2024-02-13 NOTE — ASSESSMENT & PLAN NOTE
Large right pleural effusion noted on CT chest  Status post thoracocentesis per pulmonary  Pulmonary plans indwelling pleural catheter placement given recurrence of pleural effusion noted;   Plan for pleural catheter placement today by pulmonology  Pulmonary following  Follow-up on cytology  Follow-up further pulmonology recommendations

## 2024-02-13 NOTE — ASSESSMENT & PLAN NOTE
Left shoulder pain, pain radiating to little finger  Cervical spine reveals features of cervical spine stenosis metastatic disease possibly contributing to symptoms  Continue gabapentin  PT/OT

## 2024-02-13 NOTE — PROGRESS NOTES
Progress Note - Pulmonary   Abdulaziz Gomez 63 y.o. male MRN: [id number]  Unit/Bed#: S -01 Encounter: [id number]      Assessment & Recommendations:  Acute hypoxic respiratory failure  Multifactorial to include pleural effusion, lung cancer with possible lymphangitic spread, and emphysema  Titrate O2 to keep SpO2 >90%, IS, OOB-chair    NSCLCa Stage IV - diffuse osseous metastasis, hepatic lesions  Plan for initiation of chemotherapy and XRT per oncology services   Can obtain XRT therapy today, no limitations with TPC placement  Agree with HPM consultation for cancer associated pain    Exudative neutrophilic right pleural effusion  Cytology pending but at least cancer associated effusion with re accumulation  TPC placed on right today and drained 1000cc  Planning for every other day drainage and will arrange for DME supplies and family teaching    Suspected COPD w/o AE  PFTs as outpatient  Continue xopenex/atrovent TID for now  Hold on systemic steroids    Hemoptysis - resolved, continue to monitor    Nicotine dependence - tobacco cessation, NRT    Reported Right SC and Left IJ DVT- Can resume UFH gtt at 1400 today, no bolus      Subjective:   63 year old man with COPD unknown severity, chronic cough, tobacco abuse presented from oncology clinic for hypoxia.  Recently diagnosed stage IV NSCLCa with bony and hepatic metastasis. He had large right pleural effusion post 1L thoracentesis on 2/8.    24hrs - had episode of increased dyspnea last night.  Reported improved with nebulizer therapy. Denied chest pain or pleurisy, no fevers or chills, no hemoptysis.  Plans for XRT today    Objective:     Vitals: Blood pressure 132/71, pulse (!) 108, temperature 98 °F (36.7 °C), temperature source Oral, resp. rate 18, weight 59 kg (130 lb 1.1 oz), SpO2 93%., 6L/min NC, Body mass index is 19.21 kg/m².      Intake/Output Summary (Last 24 hours) at 2/13/2024 0973  Last data filed at 2/13/2024 0521  Gross per 24 hour   Intake --    Output 300 ml   Net -300 ml       Exam  GEN Older frail man in bed, conversant, nontoxic  HEENT MMM, no thrush, no oral lesions  Neck No accessory muscle use, JVP not elevated  CV reg, single s1/2, no m/r  Pulm slight scattered wheeze improved with repeat exam after neb, diminished BS on right hemithorax at lower 1/3, no rales  ABD +BS soft NTND, no rebound  EXT warm, brisk cap refill, R>L upper ext swelling       Labs: I have personally reviewed pertinent lab results.  Laboratory and Diagnostics  Results from last 7 days   Lab Units 02/13/24 0354 02/12/24 1909 02/08/24  0532 02/07/24  1802   WBC Thousand/uL 7.86 8.98  --  8.23   HEMOGLOBIN g/dL 9.8* 9.7*  --  12.2   HEMATOCRIT % 29.7* 30.0*  --  34.8*   PLATELETS Thousands/uL 220 213 240 219   NEUTROS PCT % 65  --   --  75   BANDS PCT %  --  2  --   --    MONOS PCT % 10  --   --  9   MONO PCT %  --  2*  --   --    EOS PCT % 2 0  --  0     Results from last 7 days   Lab Units 02/13/24 0354 02/09/24  0533 02/07/24  1802   SODIUM mmol/L 131* 131* 129*   POTASSIUM mmol/L 4.3 4.6 4.1   CHLORIDE mmol/L 91* 94* 90*   CO2 mmol/L 30 28 28   ANION GAP mmol/L 10 9 11   BUN mg/dL 26* 19 12   CREATININE mg/dL 0.64 0.62 0.62   CALCIUM mg/dL 9.6 9.4 9.5   GLUCOSE RANDOM mg/dL 121 142* 107   ALT U/L  --   --  16   AST U/L  --   --  25   ALK PHOS U/L  --   --  84   ALBUMIN g/dL  --   --  3.8   TOTAL BILIRUBIN mg/dL  --   --  0.91          Results from last 7 days   Lab Units 02/13/24 0354 02/12/24 1909 02/07/24  1802   INR   --  1.13 1.05   PTT seconds 86* 193* 36                  Results from last 7 days   Lab Units 02/08/24  0532   LD U/L 709*     Right pleural fluid 2/8  TP 3.6, , Diff 88%, cytology pending    Microbiology:  Right pleural fluid 2/8  NGTD    Imaging and other studies: I have personally reviewed pertinent reports.   and I have personally reviewed pertinent films in PACS  CXR 2/13 - post TPC in good position, no PTX, improved pleural effusion,  persistent right HD elevation    Upper Ext US 2/12 - R SC vein DVT and left IJ thrombus and SC veins    CXR 2/10 - right basilar opacity mixed effusion/atelectasis    CT angio 2/7 - no PE, large right effusion, ground glass infiltrate RML, right hilar tumor encasing distal trachea and right bronchi as well as encasing BC veins and SVC, hepatic metastasis and axillary adenopathy    Alfredo Barrios, DO, FACP  [facility]

## 2024-02-13 NOTE — ASSESSMENT & PLAN NOTE
Acute hypoxic respiratory failure  Multifactorial  Patient noted to have right pleural effusion, emphysematous lungs, lung cancer encasing distal trachea right bronchi, SVC  Currently requiring 5 L nasal cannula  Suspect will likely need home oxygen  Follow-up further pulmonology recommendations

## 2024-02-13 NOTE — PROGRESS NOTES
[facility]  Progress Note  Name: Abdulaziz Gomez I  MRN: [id number]  Unit/Bed#: S -01 I Date of Admission: 2/7/2024   Date of Service: 2/13/2024 I Hospital Day: 6    Assessment/Plan   * Acute respiratory failure with hypoxia (HCC)  Assessment & Plan  Acute hypoxic respiratory failure  Multifactorial  Patient noted to have right pleural effusion, emphysematous lungs, lung cancer encasing distal trachea right bronchi, SVC  Currently requiring 5 L nasal cannula  Suspect will likely need home oxygen  Follow-up further pulmonology recommendations    Acute deep vein thrombosis (DVT) of both upper extremities (HCC)  Assessment & Plan  Today, patient was found to have significant swelling on his right upper extremity, thus, duplex scan was done.  Duplex scan results: Acute, nonocclusive DVT in the right subclavian vein, with superficial thrombophlebitis in the right cephalic vein; acute nonocclusive DVT of the left internal jugular and subclavian veins.  Currently on heparin drip  Will be held this morning for ascept catheter placement  Will need at least 3 months of anticoagulation     Left shoulder pain  Assessment & Plan  Left shoulder pain, pain radiating to little finger  Cervical spine reveals features of cervical spine stenosis metastatic disease possibly contributing to symptoms  Continue gabapentin  PT/OT    Severe protein-calorie malnutrition (HCC)  Assessment & Plan       Body mass index is 19.21 kg/m².   Encourage protein intake    COPD (chronic obstructive pulmonary disease) (HCC)  Assessment & Plan  Continue scheduled nebs    Hyponatremia  Assessment & Plan  Has since improved to 131  Continue to monitor    Pleural effusion on right  Assessment & Plan  Large right pleural effusion noted on CT chest  Status post thoracocentesis per pulmonary  Pulmonary plans indwelling pleural catheter placement given recurrence of pleural effusion noted;   Plan for pleural catheter placement  "today by pulmonology  Pulmonary following  Follow-up on cytology  Follow-up further pulmonology recommendations    Cancer related pain  Assessment & Plan  Imaging studies show extensive metastatic disease including bones  Opiate analgesics with palliative care  Follow-up further palliative care recommendations    Metastatic non-small cell lung cancer (HCC)  Assessment & Plan  Patient recently diagnosed with 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  CTA PE study : \"Redemonstration of tumor encasing the distal trachea and right bronchi extending into the anterior mediastinum and lower neck bilaterally encasing and narrowing of the brachiocephalic veins and SVC and occlusion of the right bronchi. Mild bilateral axillary lymphadenopathy, possibly metastatic. Liver metastases better shown on prior study.\"  Radiation oncology plans to initiate radiotherapy  Oncology plans for imaging and close outpatient follow-up for initiating chemotherapy noted  Outpatient oncology follow-up         VTE Pharmacologic Prophylaxis:   Pharmacologic: Heparin Drip  Mechanical VTE Prophylaxis in Place: Yes    Patient Centered Rounds: I have performed bedside rounds with nursing staff today.    Discussions with Specialists or Other Care Team Provider: cm, nursing    Education and Discussions with Family / Patient: pt    Time Spent for Care: 30 minutes.  More than 50% of total time spent on counseling and coordination of care as described above.    Current Length of Stay: 6 day(s)    Current Patient Status: Inpatient   Certification Statement: The patient will continue to require additional inpatient hospital stay due to see below    Discharge Plan: Still requiring further treatment of acute hypoxic respiratory failure.  Anticipate at least 48 hours    Code Status: Level 1 - Full Code      Subjective:   Denies chest pain, shortness of breath, cough, fevers, chills    Objective:     Vitals:   Temp (24hrs), Av.7 °F " (36.5 °C), Min:97.4 °F (36.3 °C), Max:98 °F (36.7 °C)    Temp:  [97.4 °F (36.3 °C)-98 °F (36.7 °C)] 97.4 °F (36.3 °C)  HR:  [] 101  Resp:  [16-17] 17  BP: (145-167)/(81-83) 164/83  SpO2:  [89 %-94 %] 89 %  Body mass index is 19.21 kg/m².     Input and Output Summary (last 24 hours):       Intake/Output Summary (Last 24 hours) at 2/13/2024 0900  Last data filed at 2/13/2024 0521  Gross per 24 hour   Intake --   Output 300 ml   Net -300 ml       Physical Exam:     Physical Exam  Constitutional:       General: He is not in acute distress.     Appearance: He is well-developed. He is not diaphoretic.   HENT:      Head: Normocephalic and atraumatic.      Nose: Nose normal.      Mouth/Throat:      Pharynx: No oropharyngeal exudate.   Eyes:      General: No scleral icterus.     Conjunctiva/sclera: Conjunctivae normal.   Cardiovascular:      Rate and Rhythm: Normal rate and regular rhythm.      Heart sounds: Normal heart sounds. No murmur heard.     No friction rub. No gallop.   Pulmonary:      Effort: Pulmonary effort is normal. No respiratory distress.      Breath sounds: Normal breath sounds. No wheezing or rales.   Chest:      Chest wall: No tenderness.   Abdominal:      General: Bowel sounds are normal. There is no distension.      Palpations: Abdomen is soft.      Tenderness: There is no abdominal tenderness. There is no guarding.   Musculoskeletal:         General: No tenderness or deformity. Normal range of motion.      Cervical back: Normal range of motion and neck supple.   Skin:     General: Skin is warm and dry.      Findings: No erythema.   Neurological:      Mental Status: He is alert. Mental status is at baseline.       (    Additional Data:     Labs:    Results from last 7 days   Lab Units 02/13/24  0354 02/12/24  1909   WBC Thousand/uL 7.86 8.98   HEMOGLOBIN g/dL 9.8* 9.7*   HEMATOCRIT % 29.7* 30.0*   PLATELETS Thousands/uL 220 213   BANDS PCT %  --  2   NEUTROS PCT % 65  --    LYMPHS PCT % 19  --     LYMPHO PCT %  --  8*   MONOS PCT % 10  --    MONO PCT %  --  2*   EOS PCT % 2 0     Results from last 7 days   Lab Units 02/13/24  0354 02/09/24  0533 02/07/24  1802   SODIUM mmol/L 131*   < > 129*   POTASSIUM mmol/L 4.3   < > 4.1   CHLORIDE mmol/L 91*   < > 90*   CO2 mmol/L 30   < > 28   BUN mg/dL 26*   < > 12   CREATININE mg/dL 0.64   < > 0.62   ANION GAP mmol/L 10   < > 11   CALCIUM mg/dL 9.6   < > 9.5   ALBUMIN g/dL  --   --  3.8   TOTAL BILIRUBIN mg/dL  --   --  0.91   ALK PHOS U/L  --   --  84   ALT U/L  --   --  16   AST U/L  --   --  25   GLUCOSE RANDOM mg/dL 121   < > 107    < > = values in this interval not displayed.     Results from last 7 days   Lab Units 02/12/24  1909   INR  1.13     Results from last 7 days   Lab Units 02/07/24  0752   POC GLUCOSE mg/dl 83                   * I Have Reviewed All Lab Data Listed Above.  * Additional Pertinent Lab Tests Reviewed: All Labs Within Last 24 Hours Reviewed    Imaging:    Imaging Reports Reviewed Today Include:  na  Imaging Personally Reviewed by Myself Includes:  na    Recent Cultures (last 7 days):     Results from last 7 days   Lab Units 02/08/24  0937   GRAM STAIN RESULT  2+ Polys  No bacteria seen   BODY FLUID CULTURE, STERILE  No growth       Last 24 Hours Medication List:   Current Facility-Administered Medications   Medication Dose Route Frequency Provider Last Rate    acetaminophen  975 mg Oral Q8H SHANTAL Jb Maynard DO      albuterol  2.5 mg Nebulization Q6H PRN Afsaneh Knapp MD      gabapentin  100 mg Oral TID Guera Hernández MD      heparin (porcine)  3-30 Units/kg/hr (Order-Specific) Intravenous Titrated Afsaneh Knapp MD Stopped (02/13/24 0607)    heparin (porcine)  2,400 Units Intravenous Q6H PRN Afsaneh Knapp MD      heparin (porcine)  4,800 Units Intravenous Q6H PRN Afsaneh Knapp MD      HYDROmorphone  0.5 mg Intravenous Q4H PRN Jb Maynard DO      HYDROmorphone  6 mg Oral Q4H PRN  Jennifer Paige Bloch, CRNP      HYDROmorphone  8 mg Oral Q6H [facility] Jennifer Paige Bloch, CRNP      ipratropium  0.5 mg Nebulization TID Guera Hernández MD      levalbuterol  1.25 mg Nebulization TID Guera Hernández MD      lidocaine  2 patch Topical Daily Guera Hernández MD      lidocaine-epinephrine  20 mL Infiltration Once Alfredo Barrios DO      menthol-methyl salicylate   Apply externally TID Guera Hernández MD      naloxone  0.04 mg Intravenous Q1MIN PRN Jb Maynard DO      nicotine  14 mg Transdermal Daily Guera Hernández MD      polyethylene glycol  17 g Oral Q12H Guera Hernández MD      senna-docusate sodium  2 tablet Oral BID Jb Maynard DO          Today, Patient Was Seen By: Lucius Collier MD    ** Please Note: Dictation voice to text software may have been used in the creation of this document. **

## 2024-02-13 NOTE — QUICK NOTE
Radiation Oncology Treatment Note     A treatment dose of 300 cGy was administered today to the involved tumor site(s)  [id number] using 1-10 MV photons.  Present total dose at this time is 300  cGy.  Approximately 9 more treatments  before completion of treatments or critical review.

## 2024-02-13 NOTE — ASSESSMENT & PLAN NOTE
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  CTA PE study : \"Redemonstration of tumor encasing the distal trachea and right bronchi extending into the anterior mediastinum and lower neck bilaterally encasing and narrowing of the brachiocephalic veins and SVC and occlusion of the right bronchi. Mild bilateral axillary lymphadenopathy, possibly metastatic. Liver metastases better shown on prior study.\"  Radiation oncology plans to initiate radiotherapy  Oncology plans for imaging and close outpatient follow-up for initiating chemotherapy noted  Outpatient oncology follow-up  "

## 2024-02-13 NOTE — PROGRESS NOTES
Progress Note - Palliative & Supportive Care  Abdulaziz Gomez  63 y.o.  male  S /S -01   MRN: [id number]  Encounter: [id number]     Assessment  Adenocarcinoma of the lung, non-small cell lung cancer  Metastatic disease to Bone  Cancer-related pain - thoracolumbar spine, bilateral shoulders, left anterior thigh  Right Pleural Effusion  Weight Loss, Dysphagia  Palliative Care Patient  Goals of Care Discussion      PLAN:  1. Goals:   Full Code - Level 1  Disease focused care with no limits placed.  Palliative will follow for ongoing goals of care discussions as situation evolves.     2. Social Support:  Supportive listening provided  Normalized experience of patient/family  Provided anxiety containment     3. Symptom management:  Cancer related pain  Currently, worst pain is in left shoulder  Pain worsens with any movement  No IV pain meds since 2/10  OMEs approximately 200mg for 2/12  No changes made to regimen today at patients request.    Continue APAP 975 mg q8 hours ATC  Continue 8mg q6 hours scheduled with hold parameters  Continue gabapentin 100mg TID   Continue PO dilaudid 6 mg q4 hours PRN for moderate to severe pain  Continue IV Dilaudid 0.5 mg q4 hours PRN for BTP  Narcan on order for concerns for respiratory depression or need for opioid reversal     Constipation - continue bowel regimen  Continue Senokot to 2 tabs BID.  Continue Miralax Daily     4. Follow-up  We appreciate the opportunity to participate in this patient's care.   We will continue to follow. [facility] to follow up in person on 2/14/24  Please do not hesitate to contact our on-call provider through our clinic answering service at [phone] should there be an acute change or other symptom control concerns.    5. Care Coordination  Reviewed case with [facility] EP[facility]    24 Hour History  Chart reviewed before visit.    Abdulaziz is seen today shortly after having his Pleurx catheter placed.  He states he had a really bad night with severe intense  pain that lasted approximately 15 minutes.  He reports it was so bad it caused him to have difficulty breathing.  He states multiple providers came into the room to check on him and help him calm down and get comfortable.  The intense pain has not returned since.  Discussed the fact that he is only making it approximately 4 hours between doses of pain medication.  We have 2 options, either adding a long-acting pain medication or switching his short acting scheduled meds to every 4.  Abdulaziz requests that we do not make any changes today as he prefers to see how he does over the next 24 hours since the catheter was placed.  We will reevaluate tomorrow and determine the need for additional medication adjustments.    He will update his daughter Soniya on his decision not to change his pain medication regimen to day.    His only other concern today is the fact that he is still n.p.o. due to his procedure.  Questioning when he will be allowed to eat or drink again as he just wants a big cup of coffee.    He is scheduled for radiation this afternoon and then chemo infusion after.    Review of Systems   All other systems reviewed and are negative.      Medications    Current Facility-Administered Medications:     acetaminophen (TYLENOL) tablet 975 mg, 975 mg, Oral, Q8H SHANTAL, Jb Maynard DO, 975 mg at 02/13/24 1315    albuterol inhalation solution 2.5 mg, 2.5 mg, Nebulization, Q6H PRN, Afsaneh Knapp MD    gabapentin (NEURONTIN) capsule 100 mg, 100 mg, Oral, TID, Guera Hernández MD, 100 mg at 02/13/24 0851    heparin (porcine) 25,000 units in 0.45% NaCl 250 mL infusion (premix), 3-30 Units/kg/hr (Order-Specific), Intravenous, Titrated, Afsaneh Knapp MD, Held at 02/13/24 0607    heparin (porcine) injection 2,400 Units, 2,400 Units, Intravenous, Q6H PRN, Afsaneh Knapp MD    heparin (porcine) injection 4,800 Units, 4,800 Units, Intravenous, Q6H PRN, Afsaneh Knapp MD     HYDROmorphone (DILAUDID) injection 0.5 mg, 0.5 mg, Intravenous, Q4H PRN, Jb Maynard, DO, 0.5 mg at 02/10/24 1223    HYDROmorphone (DILAUDID) tablet 6 mg, 6 mg, Oral, Q4H PRN, 6 mg at 02/13/24 0323 **OR** [DISCONTINUED] HYDROmorphone (DILAUDID) tablet 8 mg, 8 mg, Oral, Q4H PRN, Jb Olverau, DO, 8 mg at 02/12/24 0143    HYDROmorphone (DILAUDID) tablet 8 mg, 8 mg, Oral, Q6H SHANTAL, Jennifer Paige Bloch, JARADNP, 8 mg at 02/13/24 1158    ipratropium (ATROVENT) 0.02 % inhalation solution 0.5 mg, 0.5 mg, Nebulization, TID, Guera Hernández MD, 0.5 mg at 02/13/24 1317    levalbuterol (XOPENEX) inhalation solution 1.25 mg, 1.25 mg, Nebulization, TID, Guera Hernández MD, 1.25 mg at 02/13/24 1317    lidocaine (LIDODERM) 5 % patch 2 patch, 2 patch, Topical, Daily, Guera Hernández MD, 2 patch at 02/13/24 0852    lidocaine-epinephrine (XYLOCAINE/EPINEPHRINE) 1 %-1:100,000 injection 20 mL, 20 mL, Infiltration, Once, Alfredo Barrios,     menthol-methyl salicylate (BENGAY) 10-15 % cream, , Apply externally, TID, Guera Hernández MD, 1 Application at 02/12/24 2148    naloxone (NARCAN) 0.04 mg/mL syringe 0.04 mg, 0.04 mg, Intravenous, Q1MIN PRN, Jb Maynard DO    nicotine (NICODERM CQ) 14 mg/24hr TD 24 hr patch 14 mg, 14 mg, Transdermal, Daily, Guera Hernández MD, 14 mg at 02/13/24 0851    polyethylene glycol (MIRALAX) packet 17 g, 17 g, Oral, Q12H, Guera Hernández MD, 17 g at 02/12/24 2147    senna-docusate sodium (SENOKOT S) 8.6-50 mg per tablet 2 tablet, 2 tablet, Oral, BID, Jb Maynard DO, 2 tablet at 02/13/24 0851    Objective  /84   Pulse (!) 106   Temp 97.9 °F (36.6 °C)   Resp 17   Wt 59 kg (130 lb 1.1 oz)   SpO2 94%   BMI 19.21 kg/m²   Physical Exam  Vitals and nursing note reviewed.   Constitutional:       General: He is awake.   HENT:      Head: Normocephalic and atraumatic.      Mouth/Throat:      Mouth: Mucous membranes are dry.   Eyes:      General: No scleral  icterus.        Right eye: No discharge.         Left eye: No discharge.   Cardiovascular:      Rate and Rhythm: Normal rate and regular rhythm.   Pulmonary:      Effort: Pulmonary effort is normal. No respiratory distress.   Musculoskeletal:      Cervical back: Normal range of motion.      Right lower leg: No edema.      Left lower leg: No edema.   Neurological:      Mental Status: He is alert and oriented to person, place, and time.   Psychiatric:         Mood and Affect: Mood normal.         Behavior: Behavior normal. Behavior is cooperative.         Thought Content: Thought content normal.         Judgment: Judgment normal.         Lab Results: I have personally reviewed pertinent labs., CBC:   Lab Results   Component Value Date    WBC 7.86 02/13/2024    HGB 9.8 (L) 02/13/2024    HCT 29.7 (L) 02/13/2024    MCV 94 02/13/2024     02/13/2024    RBC 3.17 (L) 02/13/2024    MCH 30.9 02/13/2024    MCHC 33.0 02/13/2024    RDW 14.3 02/13/2024    MPV 9.7 02/13/2024    NRBC 0 02/13/2024   , CMP:   Lab Results   Component Value Date    SODIUM 131 (L) 02/13/2024    K 4.3 02/13/2024    CL 91 (L) 02/13/2024    CO2 30 02/13/2024    BUN 26 (H) 02/13/2024    CREATININE 0.64 02/13/2024    CALCIUM 9.6 02/13/2024    EGFR 104 02/13/2024     Imaging Studies: I have personally reviewed pertinent reports.  XR chest portable  Result Date: 2/13/2024  Impression: Insertion of right pleural drainage catheter with tip over the right lung base with drainage of right effusion. Trace right apical pneumothorax. Moderate right base atelectasis.     EKG, Pathology, and Other Studies: I have personally reviewed pertinent reports.    Counseling / Coordination of Care  Total floor / unit time spent today 30 minutes. Greater than 50% of total time was spent with the patient and / or family counseling and / or coordinating of care. A description of the counseling / coordination of care: Chart reviewed,  provided medical updates, determined goals  "of care, discussed palliative care and symptom management, provided anticipatory guidance, determined competency and POA/HCA, determined social/family support, provided psychosocial support.       Jennifer P. Bloch, MSN, CRNP, [facility]  Palliative & Supportive Care    Portions of this document may have been created using dictation software and as such some \"sound alike\" terms may have been generated by the system. Do not hesitate to contact me with any questions or clarifications.    "

## 2024-02-13 NOTE — ASSESSMENT & PLAN NOTE
Today, patient was found to have significant swelling on his right upper extremity, thus, duplex scan was done.  Duplex scan results: Acute, nonocclusive DVT in the right subclavian vein, with superficial thrombophlebitis in the right cephalic vein; acute nonocclusive DVT of the left internal jugular and subclavian veins.  Currently on heparin drip  Will be held this morning for ascept catheter placement  Will need at least 3 months of anticoagulation

## 2024-02-13 NOTE — PROCEDURES
Tunneled Pleural Catheter Placement NOTE   Abdulaziz Gomez 63 y.o. male MRN: [id number]  Unit/Bed#: S -01 Encounter: [id number]      PRE OPERATIVE DIAGNOSIS: Cancer associated pleural effusion    POST OPERATIVE DIAGNOSIS: same    LOCATION: [facility]    Informed consent was obtained.  Images reviewed prior to the procedure.  Final Time Out was performed.    ASA: 3    Analgesia: 10cc 1% epi with lidocaine     PROCEDURE:  The patient was placed on left lateral decubitus position.  Using US guidance, pleural fluid pocket identified as well as right HD and mobile lung.  Site marked at skin.    Site was prepped and draped in sterile fashion.  SQ tissue infiltrated with 1% lidocaine with epi.  Finder needle advance over rib - 5th ICS mid axillary line.  Flash pleural fluid obtained.  Finder needle removed. Introducer needle then advanced in same manner over rib and flash of pleural fluid obtained. Wire easily advanced and introducer needle removed.  3.5cm anterior to wire a 1cm skin incision was made as well as 1cm skin incision at wire entry site.  Using trochar, the catheter was tunneled under skin from anterior to posterior incision sites.  The cuff was seated 1cm past insertion site.  The pleural insertion tract was then serially dilated and the dilatator/breakaway sheath inserted and dilator removed.  The catheter was then fed thru the breakaway sheath as sheath was removed.  The catheter was fully seated under the skin.  Using catheter bottles 1000cc was drained.  3 sutures were then placed - two at wire insertion site and one at catheter insertion site.  Sterile dressing was then placed.    Post procedure CXR reviewed with and demonstrates in place TPC without PTX, improved effusion, elevated HD    COMPLICATIONS:  none    ESTIMATED BLOOD LOSS: Minimal    RECOMMENDATIONS:  Repeat drainage in 48 hours. Anticipate drainage every other day until less than 100cc for three consecutive drainage attempts.  Suture  removal in 7 days.  Can resume diet.  Can resume UFH gtt at 1400 today    Alfredo Barrios, DO

## 2024-02-13 NOTE — ASSESSMENT & PLAN NOTE
Imaging studies show extensive metastatic disease including bones  Opiate analgesics with palliative care  Follow-up further palliative care recommendations

## 2024-02-14 NOTE — PROGRESS NOTES
Progress Note - Pulmonary   Abdulaziz Gomez 63 y.o. male MRN: [id number]  Unit/Bed#: S -01 Encounter: [id number]    Assessment/Plan:    Acute hypoxic respiratory failure, multifactorial due to listed below  -91% on 6 L NC  -continue to maintain saturations greater than 89%  -Pulmonary hygiene encouraged: Deep breathing with cough, OOB as tolerated, incentive spirometry and flutter valve as tolerated  -Assess home O2 requirements prior to discharge    Right-sided pleural effusion  -Thoracentesis completed 02/08/2024 1 L clear yellow fluid removed, exudative in nature neutrophilic predominant 88%  -Cytology pending  -IPC filter placed Tuesday 02/13/24 by Dr Barrios.  Pt tolerated well.  1 L clear yellow fluid drained  -Will drain again tomorrow with family present    Stage IV adenocarcinoma of the lung  -Newly diagnosed  -PET scan 02/07/2024 IMPRESSION:   1. FDG avid intrathoracic disease most extensive at the superior mediastinum and right perihilar region concerning for malignancy.  2. Extensive FDG avid peggy disease in the lower neck, chest and abdomen suspicious for metastasis.  3. Scattered FDG-avid hepatic lesions concerning for metastasis.  4. Innumerable FDG avid osseous lesions compatible with widespread osseous metastasis.  5. A few small foci of FDG uptake along the left anterior thigh musculature for which intramuscular metastasis is not excluded.  -Medical oncology and radiation oncology following, first radiation treatment started yesterday.  Patient tolerated well    Chief Complaint:    I feel better after he drained my lung    Subjective:    Pt seen and examined at bedside.  Sitting up comfortably.  Denies any overnight events.  Patient endorses feeling much better after IPC catheter placement.  Patient overall feeling some improvement.  Had his first radiation treatment yesterday and tolerated well    Objective:    Vitals: Blood pressure 165/89, pulse 105, temperature (!) 97.3 °F (36.3 °C),  "resp. rate 17, weight 59 kg (130 lb 1.1 oz), SpO2 90%.6 L,Body mass index is 19.21 kg/m².    No intake or output data in the 24 hours ending 02/14/24 0939    Invasive Devices       Peripheral Intravenous Line  Duration             Peripheral IV 02/13/24 Right Forearm 1 day              Drain  Duration             Pleural Effusion Long-Term Catheter 16 Fr. <1 day                    Physical Exam:     Physical Exam  Vitals reviewed.   Constitutional:       General: He is not in acute distress.     Appearance: He is ill-appearing.   HENT:      Right Ear: External ear normal.      Left Ear: External ear normal.      Nose: Nose normal.      Mouth/Throat:      Mouth: Mucous membranes are moist.      Pharynx: Oropharynx is clear.   Eyes:      Extraocular Movements: Extraocular movements intact.      Pupils: Pupils are equal, round, and reactive to light.   Cardiovascular:      Rate and Rhythm: Tachycardia present. Rhythm irregular.      Pulses: Normal pulses.      Heart sounds: Normal heart sounds.   Pulmonary:      Effort: Pulmonary effort is normal.   Abdominal:      General: Bowel sounds are normal.      Palpations: Abdomen is soft.      Tenderness: There is no abdominal tenderness.   Musculoskeletal:         General: Normal range of motion.      Cervical back: Normal range of motion and neck supple.      Right lower leg: No edema.      Left lower leg: No edema.   Skin:     General: Skin is warm and dry.   Neurological:      Mental Status: He is alert and oriented to person, place, and time.   Psychiatric:         Mood and Affect: Mood normal.         Behavior: Behavior normal.         Thought Content: Thought content normal.         Judgment: Judgment normal.         Labs: I have personally reviewed pertinent lab results., ABG: No results found for: \"PHART\", \"WZD6WVN\", \"PO2ART\", \"TQZ9AXW\", \"E8SDQVVJ\", \"BEART\", \"SOURCE\", BNP: No results found for: \"BNP\", CBC:   Lab Results   Component Value Date    WBC 8.77 02/14/2024 "    HGB 9.8 (L) 02/14/2024    HCT 29.8 (L) 02/14/2024    MCV 95 02/14/2024     02/14/2024    RBC 3.15 (L) 02/14/2024    MCH 31.1 02/14/2024    MCHC 32.9 02/14/2024    RDW 14.6 02/14/2024    MPV 10.6 02/14/2024   , CMP:   Lab Results   Component Value Date    SODIUM 134 (L) 02/14/2024    K 4.0 02/14/2024    CL 93 (L) 02/14/2024    CO2 31 02/14/2024    BUN 22 02/14/2024    CREATININE 0.56 (L) 02/14/2024    CALCIUM 9.6 02/14/2024    EGFR 110 02/14/2024         Imaging and other studies: I have personally reviewed pertinent reports.    Chest x-ray 02/13/2024 IMPRESSION:     Insertion of right pleural drainage catheter with tip over the right lung base with drainage of right effusion.     Trace right apical pneumothorax.     Moderate right base atelectasis.

## 2024-02-14 NOTE — ASSESSMENT & PLAN NOTE
Ongoing support provided with symptom monitoring.  -Palliative Social Work to continue following to provide support to patient and family as well.

## 2024-02-14 NOTE — PHYSICAL THERAPY NOTE
Physical Therapy Cancellation Note             02/14/24 0820   PT Last Visit   PT Visit Date 02/14/24   Note Type   Note Type Cancelled Session   Cancel Reasons Refusal   Assessment   Assessment pt is refusing PT intervention this morning. pt stated he is having radiation at 10:30 and would like to stay in bed till after radiation. pt did state he is willing to participate in PT interevention after radiation. PT will follow and attempt to see pt this afternoon     Rich Villalba

## 2024-02-14 NOTE — ASSESSMENT & PLAN NOTE
Acute hypoxic respiratory failure  Multifactorial  Patient noted to have right pleural effusion, emphysematous lungs, lung cancer encasing distal trachea right bronchi, SVC  Status post pleural catheter placement with improvement   currently requiring 5 to 6 L nasal cannula  Wean oxygen as able  Anticipate will likely need home oxygen

## 2024-02-14 NOTE — PROGRESS NOTES
Oncology Progress Note  Abdulaziz Gomez 63 y.o. male MRN: [id number]  Unit/Bed#: S -01 Encounter: [id number]      /86   Pulse 104   Temp 97.5 °F (36.4 °C)   Resp 18   Wt 59 kg (130 lb 1.1 oz)   SpO2 91%   BMI 19.21 kg/m²     Subjective: Feels much better, mild right side pain is less after he had Pleurx catheter yesterday    Objective: Denied any fever chills nausea vomiting he still have hoarseness and exertional dyspnea  General Appearance:    Alert, oriented        Eyes:     Ears:     Nose:    Throat:    Neck:        Lungs:   Rhonchi bilaterally with decreased breath sounds on the right base   Chest Wall:    No tenderness or deformity    Heart:  Regular rhythm, tachycardia       Abdomen:     Soft, non-tender, bowel sounds +, no organomegaly           Extremities:   Extremities no cyanosis or edema       Skin: No icterus   Lymph nodes:    Neurologic:         Recent Results (from the past 48 hour(s))   APTT    Collection Time: 02/12/24  7:09 PM   Result Value Ref Range     (HH) 23 - 37 seconds   Protime-INR    Collection Time: 02/12/24  7:09 PM   Result Value Ref Range    Protime 15.2 (H) 11.6 - 14.5 seconds    INR 1.13 0.84 - 1.19   CBC and differential    Collection Time: 02/12/24  7:09 PM   Result Value Ref Range    WBC 8.98 4.31 - 10.16 Thousand/uL    RBC 3.24 (L) 3.88 - 5.62 Million/uL    Hemoglobin 9.7 (L) 12.0 - 17.0 g/dL    Hematocrit 30.0 (L) 36.5 - 49.3 %    MCV 93 82 - 98 fL    MCH 29.9 26.8 - 34.3 pg    MCHC 32.3 31.4 - 37.4 g/dL    RDW 14.1 11.6 - 15.1 %    MPV 9.1 8.9 - 12.7 fL    Platelets 213 149 - 390 Thousands/uL   Manual Differential(PHLEBS Do Not Order)    Collection Time: 02/12/24  7:09 PM   Result Value Ref Range    Segmented % 87 (H) 43 - 75 %    Bands % 2 0 - 8 %    Lymphocytes % 8 (L) 14 - 44 %    Monocytes % 2 (L) 4 - 12 %    Eosinophils, % 0 0 - 6 %    Basophils % 0 0 - 1 %    Atypical Lymphocytes % 1 (H) <=0 %    Absolute Neutrophils 7.99 (H) 1.85 - 7.62  Thousand/uL    Lymphocytes Absolute 0.81 0.60 - 4.47 Thousand/uL    Monocytes Absolute 0.18 0.00 - 1.22 Thousand/uL    Eosinophils Absolute 0.00 0.00 - 0.40 Thousand/uL    Basophils Absolute 0.00 0.00 - 0.10 Thousand/uL    Total Counted      RBC Morphology Present     Platelet Estimate Adequate Adequate    Anisocytosis Present     Ovalocytes Present     Poikilocytes Present     Polychromasia Present     Tear Drop Cells Present    Basic metabolic panel    Collection Time: 02/13/24  3:54 AM   Result Value Ref Range    Sodium 131 (L) 135 - 147 mmol/L    Potassium 4.3 3.5 - 5.3 mmol/L    Chloride 91 (L) 96 - 108 mmol/L    CO2 30 21 - 32 mmol/L    ANION GAP 10 mmol/L    BUN 26 (H) 5 - 25 mg/dL    Creatinine 0.64 0.60 - 1.30 mg/dL    Glucose 121 65 - 140 mg/dL    Calcium 9.6 8.4 - 10.2 mg/dL    eGFR 104 ml/min/1.73sq m   CBC and differential    Collection Time: 02/13/24  3:54 AM   Result Value Ref Range    WBC 7.86 4.31 - 10.16 Thousand/uL    RBC 3.17 (L) 3.88 - 5.62 Million/uL    Hemoglobin 9.8 (L) 12.0 - 17.0 g/dL    Hematocrit 29.7 (L) 36.5 - 49.3 %    MCV 94 82 - 98 fL    MCH 30.9 26.8 - 34.3 pg    MCHC 33.0 31.4 - 37.4 g/dL    RDW 14.3 11.6 - 15.1 %    MPV 9.7 8.9 - 12.7 fL    Platelets 220 149 - 390 Thousands/uL    nRBC 0 /100 WBCs    Neutrophils Relative 65 43 - 75 %    Immat GRANS % 3 (H) 0 - 2 %    Lymphocytes Relative 19 14 - 44 %    Monocytes Relative 10 4 - 12 %    Eosinophils Relative 2 0 - 6 %    Basophils Relative 1 0 - 1 %    Neutrophils Absolute 5.22 1.85 - 7.62 Thousands/µL    Immature Grans Absolute 0.22 (H) 0.00 - 0.20 Thousand/uL    Lymphocytes Absolute 1.49 0.60 - 4.47 Thousands/µL    Monocytes Absolute 0.76 0.17 - 1.22 Thousand/µL    Eosinophils Absolute 0.13 0.00 - 0.61 Thousand/µL    Basophils Absolute 0.04 0.00 - 0.10 Thousands/µL   APTT    Collection Time: 02/13/24  3:54 AM   Result Value Ref Range    PTT 86 (H) 23 - 37 seconds   APTT    Collection Time: 02/13/24  8:40 AM   Result Value Ref  Range    PTT 36 23 - 37 seconds   APTT    Collection Time: 02/13/24  7:41 PM   Result Value Ref Range    PTT 59 (H) 23 - 37 seconds   Basic metabolic panel    Collection Time: 02/14/24  4:37 AM   Result Value Ref Range    Sodium 134 (L) 135 - 147 mmol/L    Potassium 4.0 3.5 - 5.3 mmol/L    Chloride 93 (L) 96 - 108 mmol/L    CO2 31 21 - 32 mmol/L    ANION GAP 10 mmol/L    BUN 22 5 - 25 mg/dL    Creatinine 0.56 (L) 0.60 - 1.30 mg/dL    Glucose 103 65 - 140 mg/dL    Calcium 9.6 8.4 - 10.2 mg/dL    eGFR 110 ml/min/1.73sq m   CBC and differential    Collection Time: 02/14/24  4:37 AM   Result Value Ref Range    WBC 8.77 4.31 - 10.16 Thousand/uL    RBC 3.15 (L) 3.88 - 5.62 Million/uL    Hemoglobin 9.8 (L) 12.0 - 17.0 g/dL    Hematocrit 29.8 (L) 36.5 - 49.3 %    MCV 95 82 - 98 fL    MCH 31.1 26.8 - 34.3 pg    MCHC 32.9 31.4 - 37.4 g/dL    RDW 14.6 11.6 - 15.1 %    MPV 10.6 8.9 - 12.7 fL    Platelets 243 149 - 390 Thousands/uL   Manual Differential(PHLEBS Do Not Order)    Collection Time: 02/14/24  4:37 AM   Result Value Ref Range    Segmented % 73 43 - 75 %    Bands % 3 0 - 8 %    Lymphocytes % 18 14 - 44 %    Monocytes % 4 4 - 12 %    Eosinophils, % 2 0 - 6 %    Basophils % 0 0 - 1 %    Absolute Neutrophils 6.67 1.85 - 7.62 Thousand/uL    Lymphocytes Absolute 1.58 0.60 - 4.47 Thousand/uL    Monocytes Absolute 0.35 0.00 - 1.22 Thousand/uL    Eosinophils Absolute 0.18 0.00 - 0.40 Thousand/uL    Basophils Absolute 0.00 0.00 - 0.10 Thousand/uL    Total Counted      RBC Morphology Present     Platelet Estimate Adequate Adequate    Poikilocytes Present     Polychromasia Present    APTT    Collection Time: 02/14/24  4:51 AM   Result Value Ref Range    PTT 59 (H) 23 - 37 seconds   APTT    Collection Time: 02/14/24  1:22 PM   Result Value Ref Range     (H) 23 - 37 seconds         US bedside procedure    Result Date: 2/13/2024  Narrative: [id number]    XR chest portable    Result Date:  2/13/2024  Narrative: XR CHEST PORTABLE INDICATION: post tunneled pleural catheter placement. COMPARISON: Chest radiograph 2/10/2024 and 2/8/2024, chest CT 2/07/2024. FINDINGS: Right pleural drainage catheter. Mild subcutaneous emphysema in the right chest wall. Moderate right base atelectasis. No definite effusion. Small right apical pneumothorax. Normal cardiomediastinal silhouette. Bones are unremarkable for age. Normal upper abdomen. Mild elevation of the right diaphragm.     Impression: Insertion of right pleural drainage catheter with tip over the right lung base with drainage of right effusion. Trace right apical pneumothorax. Moderate right base atelectasis. Workstation performed: [id number]     Pleural catheter insertion    Result Date: 2/13/2024  Narrative: Alfredo Barrios,      2/13/2024 11:36 AM Tunneled Pleural Catheter Placement NOTE Abdulaziz Gomez 63 y.o. male MRN: [id number] Unit/Bed#: S -01 Encounter: [id number] PRE OPERATIVE DIAGNOSIS: Cancer associated pleural effusion POST OPERATIVE DIAGNOSIS: same LOCATION: [facility] Informed consent was obtained.  Images reviewed prior to the procedure.  Final Time Out was performed. ASA: 3 Analgesia: 10cc 1% epi with lidocaine PROCEDURE:  The patient was placed on left lateral decubitus position.  Using US guidance, pleural fluid pocket identified as well as right HD and mobile lung.  Site marked at skin. Site was prepped and draped in sterile fashion.  SQ tissue infiltrated with 1% lidocaine with epi.  Finder needle advance over rib - 5th ICS mid axillary line.  Flash pleural fluid obtained.  Finder needle removed. Introducer needle then advanced in same manner over rib and flash of pleural fluid obtained. Wire easily advanced and introducer needle removed.  3.5cm anterior to wire a 1cm skin incision was made as well as 1cm skin incision at wire entry site.  Using trochar, the catheter was tunneled under skin from anterior to posterior  incision sites.  The cuff was seated 1cm past insertion site.  The pleural insertion tract was then serially dilated and the dilatator/breakaway sheath inserted and dilator removed.  The catheter was then fed thru the breakaway sheath as sheath was removed.  The catheter was fully seated under the skin.  Using catheter bottles 1000cc was drained.  3 sutures were then placed - two at wire insertion site and one at catheter insertion site.  Sterile dressing was then placed. Post procedure CXR reviewed with and demonstrates in place TPC without PTX, improved effusion, elevated HD COMPLICATIONS:  none ESTIMATED BLOOD LOSS: Minimal RECOMMENDATIONS:  Repeat drainage in 48 hours. Anticipate drainage every other day until less than 100cc for three consecutive drainage attempts.  Suture removal in 7 days.  Can resume diet.  Can resume UFH gtt at 1400 today Alfredo Barrios,      VAS upper limb venous duplex scan, complete, bilateral    Result Date: 2/12/2024  Narrative:  [facility] REPORT CLINICAL: Indications:  Patient presents with right neck swelling and pain for the past few days.  Patient has known tumor compressing the SVC and brachiocephalic veins. Operative History: Patient denies any cardiovascular procedures Risk Factors The patient has history of smoking (current) 1-2 ppd.  FINDINGS:  Segment                    Right                   Left                                                  Impression              Impression              Cephalic Shoulder V        Thrombus                                        Int. Jugular                                       Non Occlusive Thrombus  Innominate Vein                                    Not Visualized          Ceph Mid Arm               Thrombus                                        Subclavian                 Non Occlusive Thrombus  Non Occlusive Thrombus  Cephalic Upper Arm Distal  Thrombus                                        Ceph AC                     Thrombus                                           CONCLUSION:  Impression RIGHT UPPER LIMB: Evidence of acute, non-occlusive deep vein thrombosis noted in the subclavian vein. Evidence of superficial thrombophlebitis noted in the cephalic vein from the elbow to the shoulder.  LEFT UPPER LIMB: Evidence of acute, non-occlusive deep vein thrombosis noted in the internal jugular and subclavian veins. Flow is not visualized in the area of the innominate vein. No evidence of superficial thrombophlebitis noted.  Technical findings were given to Dr. Afsaneh Knapp via tiger text.  SIGNATURE: Electronically Signed by: TALHA SOUSA DO on 2024-02-12 01:50:56 PM    XR shoulder 2+ vw left    Result Date: 2/12/2024  Narrative: XR SHOULDER 2+ VW LEFT INDICATION: Left shoulder pain. COMPARISON: None FINDINGS: No acute fracture or dislocation. Mild glenohumeral and acromioclavicular osteoarthritis. Known left glenoid and scapular lesions are not well evaluated by radiograph. Unremarkable soft tissues.     Impression: No acute osseous abnormality. Workstation performed: [id number]     XR chest portable    Result Date: 2/11/2024  Narrative: XR CHEST PORTABLE INDICATION: folow up hemoptysis. COMPARISON 2/8/2024: None FINDINGS: There is a stable moderate right pleural effusion with overlying opacity, likely atelectasis. No pneumothorax. Normal cardiomediastinal silhouette. Bones are unremarkable for age. The colon is slightly distended with gas and stool in the right colon.     Impression: 1.  Stable right basilar opacity likely representing a combination of atelectasis and pleural effusion. 2.  Mild colonic distention with right-sided stool. Workstation performed: [id number]     MRI lumbar spine w wo contrast    Result Date: 2/10/2024  Narrative: MRI LUMBAR SPINE WITH AND WITHOUT CONTRAST INDICATION: cord compression bony mets. COMPARISON: PET/CT from 2/7/2024. Concurrent MRIs of the cervical and thoracic spine TECHNIQUE:   Multiplanar, multisequence imaging of the lumbar spine was performed before and after gadolinium administration. . IV Contrast:  6 mL of Gadobutrol injection (SINGLE-DOSE) IMAGE QUALITY:  Diagnostic FINDINGS: VERTEBRAL BODIES:  There are 5 lumbar type vertebral bodies. Redemonstration of diffuse osseous metastatic disease. Slight loss of height at L1. Posterior bowing of the cortex and mild ventral epidural disease at L1 with mild effacement of the thecal sac. Lumbar levoscoliosis. No subluxation. SACRUM: Osseous metastatic disease in the sacrum and pelvis most pronounced in the left sacrum. Extraosseous tumor/bony expansion at S1 with mild narrowing of the left canal at S1, there is effacement of the left lateral recess at S1 and extraosseous tumor involving the left S1 foramen. Tumor also involves the left foramen at L5-S1. DISTAL CORD AND CONUS: Distal cord/conus normal in size and signal intensity. No compression of the conus or cauda equina. There is a 2 mm enhancing nodule along the cauda equina nerve root at T12 on image 10 series 26. While this represent metastatic disease, there is no other evidence of intradural enhancement throughout the neural axis and this could also represent incidental nerve sheath tumor. PARASPINAL SOFT TISSUES:  Paraspinal soft tissues are unremarkable. LOWER THORACIC DISC SPACES: See dedicated thoracic spine MRI. LUMBAR DISC SPACES: L1-L2: No disc herniation, canal or foraminal stenosis. L2-L3: No disc herniation, canal or foraminal stenosis. Mild facet arthropathy. L3-L4: Minimal disc bulge and mild facet arthropathy. No significant canal or left foraminal stenosis. Mild right foraminal stenosis. L4-L5: Small disc bulge. Mild facet arthropathy. No significant canal stenosis. Mild foraminal stenosis, right worse than left. L5-S1: Small disc bulge and facet arthropathy. Extraosseous tumor at S1 narrows the left lateral recess and extends into the left foramen L5-S1 as described  above. Mild canal stenosis. Mild right and severe left foraminal stenosis. POSTCONTRAST IMAGING: As above OTHER FINDINGS: Hepatic lesions compatible with metastases and cysts on recent MRI abdomen. Small right renal cysts     Impression: Redemonstration of diffuse osseous metastatic disease. No compression of the conus or cauda equina. Slight loss of height at L1 with mild epidural disease without significant canal stenosis. Extraosseous disease arising from the left sacral metastasis effacing the left lateral recess S1 and encroaching on the left S1 foramen as well as foraminal extension into the foramen at L5-S1 Tiny enhancing intradural extramedullary nodule along the conus at T12. Metastatic disease not excluded but this could also represent incidental nerve sheath tumor. Recommend follow-up and/or consider correlation with CSF. Study marked in epic for notification. Workstation performed: [id number]     MRI thoracic spine w wo contrast    Result Date: 2/10/2024  Narrative: MRI THORACIC SPINE WITH AND WITHOUT CONTRAST INDICATION: cord compression bony mets. COMPARISON: PET/CT from 2/7/2024, chest CTs from 2/7/2024 and 1/11/2024. Concurrent MRI of the cervical and lumbar spine. TECHNIQUE:  Multiplanar, multisequence imaging of the thoracic spine was performed before and after gadolinium administration. . IV Contrast:  6 mL of Gadobutrol injection (SINGLE-DOSE) IMAGE QUALITY: There is motion artifact. FINDINGS: VERTEBRAL BODIES: Redemonstration of diffuse osseous metastatic disease. Pathologic compression fractures with severe loss of height at C7 and mild to moderate compression at T1 and T2 as described on concurrent cervical spine MRI with posterior bowing of the cortex/mild epidural disease causing mild canal stenosis at C7 and T1. Slight compression at L1. There is slight posterior bowing of the cortex/mild epidural disease at T2 and L1 that mildly indents the thecal sac without significant canal stenosis.  Vertebral are otherwise normal in height. THORACIC CORD:  Normal signal within the thoracic cord. No cord compression. PREVERTEBRAL AND PARASPINAL SOFT TISSUES:   Unremarkable THORACIC DEGENERATIVE CHANGE:  No disc herniation, canal stenosis or foraminal narrowing. POSTCONTRAST: There is a 2 mm enhancing intradural extramedullary nodule along the conus at T12  on image 10 series 26. This is poorly visualized on axial imaging due to motion. While this represent metastatic disease, there is no other evidence of intradural enhancement throughout the neural axis and this could also represent incidental nerve sheath tumor. OTHER FINDINGS:  Redemonstration of osseous metastasis in the sternum and multiple ribs. Redemonstration of partially imaged mediastinal tumor encasing and narrowing the SVC and central brachiocephalic veins. Moderate right pleural effusion is decreased in size. Right basilar atelectasis. There are liver lesions compatible with known metastases. Small right renal cyst. Please refer to recent chest CT and PET/CT for further discussion.     Impression: Redemonstration of diffuse osseous metastatic disease. Pathologic compression fractures C7-T2. Subtle L1 compression. Posterior bowing of the cortex/mild epidural disease most pronounced at C7-T1 with mild canal stenosis. No cord compression. 2 mm enhancing intradural extramedullary nodule at T12. Metastasis not excluded but this could represent incidental nerve sheath tumor. Study was marked in epic for notification. Workstation performed: [id number]     MRI cervical spine w wo contrast    Result Date: 2/10/2024  Narrative: MRI CERVICAL SPINE WITH AND WITHOUT CONTRAST INDICATION: mets cord compression. COMPARISON: Cervical spine x-ray dated 1/3/2024, PET/CT dated 2/7/2024 MRI brain dated 2/5/2024. Chest CT dated 2/7/2024 and 1/11/2024. TECHNIQUE:  Multiplanar, multisequence imaging of the cervical spine was performed before and after gadolinium  administration. . IV Contrast:  6 mL of Gadobutrol injection (SINGLE-DOSE) IMAGE QUALITY: There is motion artifact.. FINDINGS: VERTEBRAL BODIES: Redemonstration of diffuse osseous metastatic disease. There are pathologic compression fractures at with severe central compression at C7 and moderate loss of height at T1 and T2 that are stable compared with most recent chest CT. C7 compression has progressed since January. T1 compression is also mildly progressed from January chest CT and the T2 fracture is not appreciated on CT from 1/11/2024. There is posterior bowing of the cortex and perhaps mild ventral epidural disease at C7 and T1 causing mild canal stenosis. CERVICAL AND VISUALIZED UPPER THORACIC CORD:  Normal signal within the visualized cord. No cord compression. PREVERTEBRAL AND PARASPINAL SOFT TISSUES:  Normal. VISUALIZED POSTERIOR FOSSA:  The visualized posterior fossa demonstrates no abnormal signal. CERVICAL DISC SPACES: Degree of foraminal stenosis may be exaggerated by motion. C2-C3: No disc herniation, canal or foraminal stenosis. C3-C4: Small disc osteophyte complex with facet and uncovertebral hypertrophy. Mild canal stenosis. Severe right and moderate left foraminal stenosis. C4-C5: No disc herniation or canal stenosis. Facet arthropathy. No significant right foraminal stenosis. Moderate left foraminal stenosis suggested.. C5-C6: No disc herniation or canal stenosis. Facet and uncovertebral hypertrophy. Moderate to severe foraminal stenosis. C6-C7: No disc herniation. No significant canal stenosis. Bilateral facet and uncovertebral hypertrophy. Extraosseous tumor appears to extend into the left foramen. There is a least moderate right and severe left foraminal stenosis.. C7-T1: No disc herniation. Mild canal stenosis due to posterior bowing of the cortex/mild epidural disease. No definite foraminal stenosis. UPPER THORACIC DISC SPACES: See concurrent thoracic MRI. POSTCONTRAST IMAGING: No abnormal  intrathecal enhancement. OTHER FINDINGS: See chest CT/PET CT for discussion of metastatic disease in the sternum and mediastinum..     Impression: Redemonstration of diffuse osseous metastatic disease. Pathologic compression fractures with severe loss of height at C7 and mild to moderate compression at T1 and T2. Posterior bowing of the cortex/mild ventral epidural disease at C7-T1 causing mild canal stenosis. No cord compression. Degenerative spondylosis as above with mild canal stenosis and multilevel foraminal stenosis. Superimposed extraosseous tumor into the left C6-7 foramen contributing to severe foraminal stenosis. Study marked in epic for notification. Workstation performed: [id number]     XR chest portable    Result Date: 2/8/2024  Narrative: XR CHEST PORTABLE INDICATION: post Right 1 L removal thoracentesis. COMPARISON: 1/3/2024, 2/7/2024 FINDINGS: Stable  elevation/eventration of the right hemidiaphragm. Persistent moderate right pleural effusion. No pneumothorax. Persistent airspace opacity at the right lung base. Lungs are otherwise clear. Stable cardiomediastinal silhouette.     Impression: Persistent moderate right pleural effusion. No pneumothorax. Persistent airspace opacity at the right lung base. Workstation performed: [id number]     Thoracentesis (Bedside)    Result Date: 2/8/2024  Narrative: Ruy Powell MD     2/8/2024  9:32 AM Thoracentesis (Bedside) Date/Time: 2/8/2024 9:31 AM Performed by: Ruy Powell MD Authorized by: Ruy Powell MD  Patient location:  Bedside Consent:   Consent obtained:  Written   Consent given by:  Patient   Risks discussed:  Bleeding, infection, pain, pneumothorax, nerve damage and incomplete drainage   Alternatives discussed:  No treatment Universal protocol:   Patient identity confirmed:  Verbally with patient Indications:   Procedure Purpose: diagnostic    Indications: pleural effusion  Anesthesia (see MAR for exact dosages):   Anesthesia method:  Local infiltration    Local anesthetic:  Lidocaine 1% w/o epi Procedure details:   Preparation: Patient was prepped and draped in usual sterile fashion    Standard thoracentesis cath kit used: Yes    Patient position:  Sitting   Laterality:  Right   Location:  Lateral   Intercostal space:  7th   Puncture method:  Over-the-needle catheter   Ultrasound guidance: yes    Reason for ultrasound: Identify fluid collection and guide catheter placement.    Ultrasound image availability:  Not obtained due to urgency   Sterile ultrasound techniques: Sterile gel and sterile probe covers were used    Indwelling catheter placed: no    Number of attempts:  1   Needle gauge:  18   Catheter size:  8 Fr   Drainage color:  Yellow   Drainage characteristics:  Clear   Fluid removed amount:  1000 Post-procedure details:   Patient tolerance of procedure:  Tolerated well, no immediate complications Comments:    Written consent, 1 L removed from right thoracentesis, sending labs. If malignant he may benefit from a indwelling pleural catheter RN aware of lab specimen     CTA ED chest PE Study    Result Date: 2/7/2024  Narrative: CTA - CHEST WITH IV CONTRAST - PULMONARY ANGIOGRAM INDICATION:   SOB, metastatic cancer. Per my review of the medical record, newly diagnosed metastatic non-small cell lung cancer COMPARISON: PET/CT 2/7/2024, chest CT 1/11/2024. TECHNIQUE: CT angiogram timed for optimal opacification of the pulmonary arteries.  Axial, sagittal, and coronal 2D reformats created from source data.  Coronal 3D MIP postprocessing on the acquisition scanner. Radiation dose length product (DLP):  346 mGy-cm .  Radiation dose exposure minimized using iterative reconstruction and automated exposure control. IV Contrast:  85 mL of iohexol (OMNIPAQUE) FINDINGS: PULMONARY ARTERIES:  No pulmonary embolus. LUNGS: Moderate emphysema. Worsening groundglass opacity in the right middle lobe. Moderate compressive atelectasis in the right lower lobe. Moderate emphysema.  PLEURA: Persistent large right effusion. Trace left effusion. HEART/GREAT VESSELS: Normal heart size. Small pericardial effusion. Mild coronary artery calcification indicating atherosclerotic heart disease. MEDIASTINUM AND CLEMENTINA: Redemonstration of tumor encasing the distal trachea and right bronchi. Tumor in the right anterior mediastinum extending into the lower neck bilaterally. Persistent encasement and narrowing of both brachiocephalic veins and the SVC  with reflux of contrast into mediastinal collaterals in the azygos and hemiazygos veins.. CHEST WALL AND LOWER NECK: Mild bilateral axillary lymphadenopathy. UPPER ABDOMEN: Multiple low-attenuation lesions in the liver are due to cysts and metastases. Right renal cysts, one hyperdense. OSSEOUS STRUCTURES: Redemonstration of destruction of the manubrium and upper body of the sternum. Metastatic disease involving the right first and ninth ribs and the left third rib. Multiple subtle lytic metastases throughout the spine.     Impression: No pulmonary embolus. Persistent large right pleural effusion. Worsening groundglass opacity in the right middle lobe, of uncertain etiology. Redemonstration of tumor encasing the distal trachea and right bronchi extending into the anterior mediastinum and lower neck bilaterally encasing and narrowing of the brachiocephalic veins and SVC and occlusion of the right bronchi. Mild bilateral axillary lymphadenopathy, possibly metastatic. Liver metastases better shown on prior study. Multiple bone metastases. Workstation performed: [id number]     MRI brain w wo contrast    Result Date: 2/7/2024  Narrative: MRI BRAIN WITH AND WITHOUT CONTRAST INDICATION: C34.90: Malignant neoplasm of unspecified part of unspecified bronchus or lung C79.51: Secondary malignant neoplasm of bone. COMPARISON: PET scan from 2/7/2024 TECHNIQUE: Multiplanar, multisequence imaging of the brain was performed before and after gadolinium administration. IV  Contrast:  6 mL of Gadobutrol injection (SINGLE-DOSE) IMAGE QUALITY:   Diagnostic. FINDINGS: BRAIN PARENCHYMA:  There is no discrete mass, mass effect or midline shift. There is no intracranial hemorrhage.  Normal posterior fossa.  Diffusion imaging is unremarkable. A few small scattered hyperintensities on T2/FLAIR imaging are noted in the periventricular and subcortical white matter demonstrating an appearance that is statistically most likely to represent minimal to mild microangiopathic change. Probable right parietal developmental venous anomaly. No enhancing lesion is identified. VENTRICLES:  Normal for the patient's age. SELLA AND PITUITARY GLAND:  Normal. ORBITS:  Normal. PARANASAL SINUSES:  Normal. VASCULATURE:  Evaluation of the major intracranial vasculature demonstrates appropriate flow voids. CALVARIUM AND SKULL BASE: 5 mm T2 hyperintense, mildly T1 hypointense lesion within the right parietal calvarium (5:20). T1 hypointense, T2 hyperintense lesion involving the right C1 lateral mass. T1 hypointense signal within the left side of the C2 vertebral body and throughout the C3 vertebral bodies. T1 hypointense signal extends into the left posterior elements at C2 and C3. previously questioned left mandibular angle lesion is suboptimally evaluated due to field-of-view. EXTRACRANIAL SOFT TISSUES:  Normal.     Impression: -No intracranial metastatic disease. No acute intracranial abnormality. -Indeterminant 5 mm T2 hyperintense mildly T1 hypointense lesion within the right parietal calvarium. Differential includes metastatic disease although no FDG uptake was noted in this region on subsequent PET scan and this lesion is not as T1 hypointense  as below mentioned cervical lesions. Comparison with prior imaging would be helpful. 3-month follow-up exam can be considered to document stability. -Suboptimally evaluated T1 hypointense lesions within the right C1 lateral mass, C2 and C3 vertebral bodies including the  left-sided posterior elements. There was associated FDG uptake on subsequent PET scan, suspicious for metastatic disease. Dedicated MRI of the cervical spine with and without contrast can be obtained for complete evaluation. The study was marked in EPIC for significant notification. Workstation performed: [id number]     NM PET CT skull base to mid thigh    Result Date: 2/7/2024  Narrative: PET/CT SCAN INDICATION: Newly diagnosed non-small cell lung cancer. 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 Multiplanar attenuation corrected and non attenuation corrected PET images are available for interpretation, and contiguous, low dose, axial CT sections were obtained from the skull vertex through the femurs. Intravenous contrast material was not utilized. This examination, like all CT scans performed in the [facility], was performed utilizing techniques to minimize radiation dose exposure, including the use of iterative reconstruction and automated exposure control. Fasting serum glucose: 83 mg/dl FINDINGS: VISUALIZED BRAIN: No acute abnormalities are seen. HEAD/NECK: FDG avid peggy disease in the lower neck bilaterally. In the left lower neck, conglomerate adenopathy demonstrates SUV max of 5. This measures on the order of 3 cm in AP dimension image 95 series 3. CT images: No additional significant findings. CHEST: Extensive FDG avid superior mediastinal disease anteriorly extending to the anterior chest wall, SUV max 11 at the level of the manubrium. Mass is not well-defined on CT. Ill-defined soft tissue thickening in the right perihilar region demonstrates SUV max of 4.9. Small focus of FDG uptake in the left hilar region, SUV max 3.3. Mild  FDG uptake in shotty appearing axillary lymph nodes bilaterally, nonspecific. Previously noted tiny pulmonary nodules not well seen on low-dose CT. These are likely too small to assess by PET. CT images: Moderate to large right pleural effusion, increased from prior CT. Stable small to moderate pericardial effusion. Scattered emphysematous changes of the lung fields. ABDOMEN: Scattered FDG-avid hepatic lesions. Lesion at the left lateral margin of the liver demonstrates SUV max of 9. On prior CT in retrospect there is likely a 1.9 cm ovoid hypodense lesion. This is difficult to visualize on MRI due to motion at this level. The lesion in the left lobe of the liver just lateral to the falciform ligament demonstrates SUV max of 5.9. No obvious findings limited CT. This measured 1.1 cm on prior MRI. Two lesions identified at the dome of the liver, SUV max of 5.5 posteriorly. No obvious findings on the limited CT. These measure on the order of 1 cm on the prior MRI. FDG uptake in scattered small retroperitoneal lymph nodes. A retrocaval lymph node demonstrates a SUV max of 3.2. See image 167 series 1200. This measures 4 mm short axis image 193 series 3. Limited assessment for renal lesions due to normal excretion of FDG by the genitourinary system. CT images: No additional significant findings. PELVIS: There are a few foci of FDG uptake along the left anterior thigh musculature. A focus here demonstrates SUV max of 3.4. See for example, image 241 series 1200. Intramuscular metastasis is not excluded. Minimal activity in the left hemipelvis likely physiologic ureteral activity. No suspicious FDG avid soft tissue lesions. CT images: No additional significant finding. OSSEOUS STRUCTURES: Innumerable FDG-avid osseous lesions compatible with widespread osseous metastasis. Some of these are lytic on CT. Reference lesions as noted below: Lesion at the right lateral mass of C1 demonstrates SUV max of 8.2. Lesion at the left  mandible posteriorly demonstrates SUV max of 6.5. Left glenoid lesion demonstrates SUV max of 9.4. Manubrial lesion demonstrates SUV max of 11. Left scapular body lesion demonstrates SUV max 7.9. T4 vertebral body lesion demonstrates SUV max of 11. L4 vertebral body lesion demonstrates SUV max of 13. Left sacral lesion demonstrates SUV max of 12. The right anterior iliac lesion demonstrates SUV max of 11. Left posterior iliac lesion demonstrates SUV max of 11. Right posterior iliac lesion demonstrates SUV max of 12. Left posterior acetabular lesion demonstrates SUV max of 10. Right proximal femur lesion at the level at the intertrochanteric region demonstrates SUV max of 7.9.     Impression: 1. FDG avid intrathoracic disease most extensive at the superior mediastinum and right perihilar region concerning for malignancy. 2. Extensive FDG avid peggy disease in the lower neck, chest and abdomen suspicious for metastasis. 3. Scattered FDG-avid hepatic lesions concerning for metastasis. 4. Innumerable FDG avid osseous lesions compatible with widespread osseous metastasis. 5. A few small foci of FDG uptake along the left anterior thigh musculature for which intramuscular metastasis is not excluded. Workstation performed: [id number]     MRI abdomen w wo contrast    Result Date: 1/31/2024  Narrative: MRI - ABDOMEN - WITH AND WITHOUT CONTRAST INDICATION: 63 years / Male. K76.9: Liver disease, unspecified C80.1: Malignant (primary) neoplasm, unspecified M89.8X9: Other specified disorders of bone, unspecified site. Liver lesion and increased pain. Based on epic prior imaging reports and pathology, patient had a sternal lytic lesion biopsy demonstrating metastatic non-small cell carcinoma. COMPARISON: Chest CT from 1/11/2024. TECHNIQUE: Multiplanar/multisequence MRI of the abdomen was performed before and after administration of contrast. IV Contrast: 6 mL of Gadobutrol injection (SINGLE-DOSE) FINDINGS: LOWER CHEST: Moderate  right pleural effusion and trace left pleural effusion. Small pericardial effusion. LIVER: Normal in size and configuration. In segment 4B of the liver, there is a 1.1 cm mildly T2 hyperintense, and hypoenhancing liver lesion suspicious for metastasis (series 7 image 19, series 11 images 60-63, images 178-183.) There is altered perfusion of the liver distal to this lesion. Similarly, there are 2 additional adjacent lesions in the right hepatic dome, each 1 cm, also suspicious for metastatic disease (series 7 image 9 and series 11 image 30, 150.) There are are numerous small simple cysts scattered throughout the rest of the liver. Patent hepatic and portal veins. BILE DUCTS: No intrahepatic or extrahepatic bile duct dilation. GALLBLADDER: Normal PANCREAS: Unremarkable. ADRENAL GLANDS: Unremarkable. SPLEEN: Normal. KIDNEYS/PROXIMAL URETERS: No hydroureteronephrosis. No suspicious renal mass. Several small simple renal cysts. BOWEL: No dilated loops of bowel. PERITONEUM/RETROPERITONEUM: No ascites. LYMPH NODES: No abdominal lymphadenopathy. VESSELS: No aneurysm. ABDOMINAL WALL: Unremarkable BONES: No suspicious osseous lesion.     Impression: In segment 4B of the liver, there is a 1.1 cm mildly T2 hyperintense, and hypoenhancing liver lesion suspicious for metastasis (series 7 image 19, series 11 images 60-63, images 178-183.) Similarly, there are 2 additional adjacent lesions in the right hepatic dome, each 1 cm, also suspicious for metastatic disease (series 7 image 9 and series 11 image 30, 150.) Workstation performed: [id number]     XR follow up    Result Date: 1/27/2024  Narrative: ORBITS INDICATION:   K76.9: Liver disease, unspecified C80.1: Malignant (primary) neoplasm, unspecified M89.8X9: Other specified disorders of bone, unspecified site. COMPARISON:  None VIEWS:  XR FOLLOW UP (NO CHARGE) FINDINGS: There is no evidence of radiopaque orbital foreign body. The paranasal sinuses are clear.     Impression: No  radiopaque orbital foreign body. Workstation performed: [id number]     IR biopsy bone    Result Date: 1/26/2024  Narrative: CT-scan guided needle biopsy of the manubrium History: Patient with erosive lesion in the manubrium. Conscious sedation time: 45 minutes Technique: This examination, like all CT scans performed in the [facility], was performed utilizing techniques to minimize radiation dose exposure, including the use of iterative reconstruction and automated exposure control. The patient was brought to the CT scanner and placed supine on the table. After axial images were obtained through the region of interest an area of the skin was then marked, prepped, and draped in usual sterile fashion. Lidocaine was administered to the  skin and a small skin incision was made. A A 14-gauge cannula was advanced into the manubrial lesion and multiple 15-gauge cores were obtained. Specimen was submitted in both formalin and RPMI. The needle was removed and hemostasis was achieved. The patient tolerated the procedure well and suffered no complications.     Impression: Impression: Successful percutaneous core biopsy of a manubrial lesion. 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, right malignant pleural effusion    He was admitted to the hospital because of lower back pain, was found to have multiple compression fractures and SI joint involvement    On palliative radiation therapy    Because of rapid symptoms I believe treatment with pemetrexed/carboplatin as an outpatient is vital at the patient had Pleurx catheter with relief of accumulation of malignant pleural effusion    Will start the chemotherapy on 2/16/2024 with pemetrexed 400 mg/m², carboplatin AC 5 concurrent with radiation therapy    He signed the consent for inpatient treatment    He will receive Decadron 4 mg p.o. twice daily the day  before the chemotherapy and the day after the chemotherapy    Folic acid 1 mg p.o. daily, vitamin B12 sublingual every day    This is stage IV disease not curable

## 2024-02-14 NOTE — ASSESSMENT & PLAN NOTE
Imaging studies show extensive metastatic disease including bones  Palliative care on board   follow-up further recommendations regarding pain control

## 2024-02-14 NOTE — PROGRESS NOTES
Critical access hospital  Progress Note  Name: Abdulaziz Gomez I  MRN: [id number]  Unit/Bed#: S -01 I Date of Admission: 2/7/2024   Date of Service: 2/14/2024 I Hospital Day: 7    Assessment/Plan   Cancer related pain  Assessment & Plan  Cancer related pain  Ongoing left shoulder pain exacerbated by movement or needing to lie flat on table.  No changes made to regimen today at patients request.     -offered options of addition of Long-acting opioid (Patient adamantly against Fentanyl in any form). Discussed ER Oxycodone vs Morphine but patient would like to hold off on these options at this time.   -Discussed offer to increase frequency of PO Dilaudid 8mg to q4 hours ATC, however, patient decided to leave current regimen as is for now.    Continue APAP 975 mg q8 hours ATC  Continue 8mg q6 hours scheduled with hold parameters  Continue gabapentin 100mg TID   Continue PO dilaudid 6 mg q4 hours PRN for moderate to severe pain  Continue IV Dilaudid 0.5 mg q4 hours PRN for BTP  Narcan on order for concerns for respiratory depression or need for opioid reversal         Social Support:  Supportive listening provided  Normalized experience of patient/family  Provided anxiety containment    Follow-up  We appreciate the opportunity to participate in this patient's care.   We will continue to follow. [facility]SC to follow up.  Please do not hesitate to contact our on-call provider through our clinic answering service at [phone] should there be an acute change or other symptom control concerns.    Code status: Level 1 - Full Code   Decisional apparatus:  Patient does have capacity to make medical decisions on my exam today. If such capacity is lost, patient's substitute decision maker would default to wife by [place] Act 169. However, patient completed ACP paperwork to indicate his daughter, Soniya, as healthcare agent.   Advance Directive / Living Will / POLST:  Yes on file    We appreciate the opportunity to  participate in this patient's care. We will continue to follow. Please do not hesitate to contact our on-call provider through our clinic answering service at [phone] should you have acute symptom control concerns.    Subjective:  Patient seen and examined today lying in bed awake without acute distress.  Patient states he still has pain that seems to move between his left shoulder and left thigh. Feels the increase in frequency and dosing of the PO Dilaudid has been helping. We discussed options for long acting opioid to be introduced vs increasing dosing to q4 hours of his PO Dilaudid. Patient was adamant about NOT starting Fentanyl in any form or fashion due to his concerns with the medication. We discussed options of long-acting Oxycodone vs Morphine as alternative options due to his concerns. He states he would like to just keep his current regimen the way it is with PO Dilaudid at the q6 hour dosing with PRNs as he needs it. He does not want any changes for today and he will see how he feels tomorrow and we will revisit our options again.  Otherwise, he tolerated his first radiation treatment yesterday and denies any new symptoms at this time.    Review of Systems   Constitutional:  Positive for fatigue. Negative for activity change, appetite change, chills and fever.   HENT:  Negative for trouble swallowing.    Eyes:  Negative for visual disturbance.   Respiratory:  Positive for cough. Negative for shortness of breath.    Cardiovascular:  Negative for chest pain, palpitations and leg swelling.   Gastrointestinal:  Negative for abdominal pain, nausea and vomiting.   Genitourinary:  Negative for difficulty urinating and dysuria.   Musculoskeletal:  Positive for arthralgias (left shoulder, left leg/anterior thigh), back pain and myalgias (left thigh).   Neurological:  Negative for dizziness, light-headedness, numbness and headaches.   Psychiatric/Behavioral:  Positive for sleep disturbance. Negative for  confusion.    All other systems reviewed and are negative.      MEDICATIONS / ALLERGIES:  all current active meds have been reviewed, current meds:   Current Facility-Administered Medications   Medication Dose Route Frequency    acetaminophen (TYLENOL) tablet 975 mg  975 mg Oral Q8H SHANTAL    albuterol inhalation solution 2.5 mg  2.5 mg Nebulization Q6H PRN    fluticasone (FLONASE) 50 mcg/act nasal spray 1 spray  1 spray Each Nare Daily    gabapentin (NEURONTIN) capsule 100 mg  100 mg Oral TID    heparin (porcine) 25,000 units in 0.45% NaCl 250 mL infusion (premix)  3-30 Units/kg/hr (Order-Specific) Intravenous Titrated    heparin (porcine) injection 2,400 Units  2,400 Units Intravenous Q6H PRN    heparin (porcine) injection 4,800 Units  4,800 Units Intravenous Q6H PRN    HYDROmorphone (DILAUDID) injection 0.5 mg  0.5 mg Intravenous Q4H PRN    HYDROmorphone (DILAUDID) tablet 6 mg  6 mg Oral Q4H PRN    HYDROmorphone (DILAUDID) tablet 8 mg  8 mg Oral Q6H SHANTAL    ipratropium (ATROVENT) 0.02 % inhalation solution 0.5 mg  0.5 mg Nebulization TID    levalbuterol (XOPENEX) inhalation solution 1.25 mg  1.25 mg Nebulization TID    lidocaine (LIDODERM) 5 % patch 2 patch  2 patch Topical Daily    lidocaine-epinephrine (XYLOCAINE/EPINEPHRINE) 1 %-1:100,000 injection 20 mL  20 mL Infiltration Once    menthol-methyl salicylate (BENGAY) 10-15 % cream   Apply externally TID    naloxone (NARCAN) 0.04 mg/mL syringe 0.04 mg  0.04 mg Intravenous Q1MIN PRN    nicotine (NICODERM CQ) 14 mg/24hr TD 24 hr patch 14 mg  14 mg Transdermal Daily    polyethylene glycol (MIRALAX) packet 17 g  17 g Oral Q12H    senna-docusate sodium (SENOKOT S) 8.6-50 mg per tablet 2 tablet  2 tablet Oral BID    sodium chloride (OCEAN) 0.65 % nasal spray 1 spray  1 spray Each Nare Q1H PRN   , and PTA meds:   Prior to Admission Medications   Prescriptions Last Dose Informant Patient Reported? Taking?   HYDROmorphone HCl ER 8 MG TB24 2/7/2024  No Yes   Sig: Take 1  tablet by mouth in the morning Max Daily Amount: 1 tablet   albuterol (Proventil HFA) 90 mcg/act inhaler Not Taking Self No No   Sig: Inhale 2 puffs every 6 (six) hours as needed for wheezing   Patient not taking: Reported on 1/3/2024   oxyCODONE-acetaminophen (Percocet) 5-325 mg per tablet 2/6/2024  No Yes   Sig: Take 1 tablet by mouth every 6 (six) hours as needed for moderate pain Max Daily Amount: 4 tablets      Facility-Administered Medications: None       No Known Allergies    OBJECTIVE:  /86   Pulse 104   Temp 97.5 °F (36.4 °C)   Resp 18   Wt 59 kg (130 lb 1.1 oz)   SpO2 91%   BMI 19.21 kg/m²   Nursing notes reviewed.  Physical Exam  Vitals and nursing note reviewed.   Constitutional:       General: He is not in acute distress.     Appearance: He is ill-appearing. He is not toxic-appearing or diaphoretic.      Interventions: He is not intubated.  HENT:      Head: Normocephalic and atraumatic.      Mouth/Throat:      Comments: hoarseness noted  Eyes:      Conjunctiva/sclera: Conjunctivae normal.   Cardiovascular:      Rate and Rhythm: Tachycardia present.   Pulmonary:      Effort: Pulmonary effort is normal. No tachypnea, bradypnea, accessory muscle usage or respiratory distress. He is not intubated.      Breath sounds: Normal breath sounds.   Abdominal:      Palpations: Abdomen is soft.      Tenderness: There is no abdominal tenderness. There is no guarding.   Musculoskeletal:         General: No swelling.      Right lower leg: No tenderness. No edema.      Left lower leg: No tenderness. No edema.   Skin:     General: Skin is warm and dry.      Coloration: Skin is not cyanotic or pale.   Neurological:      General: No focal deficit present.      Mental Status: He is alert. He is disoriented.   Psychiatric:         Mood and Affect: Mood normal. Mood is not anxious.         Behavior: Behavior normal. Behavior is not agitated.         Lab Results: I have personally reviewed pertinent  "labs.    HEMATOLOGY PROFILE:  Results from last 7 days   Lab Units 02/14/24  0437 02/13/24  0354 02/12/24  1909 02/08/24  0532 02/07/24  1802   WBC Thousand/uL 8.77 7.86 8.98  --  8.23   HEMOGLOBIN g/dL 9.8* 9.8* 9.7*  --  12.2   HEMATOCRIT % 29.8* 29.7* 30.0*  --  34.8*   PLATELETS Thousands/uL 243 220 213   < > 219   NEUTROS PCT %  --  65  --   --  75   MONOS PCT %  --  10  --   --  9   MONO PCT % 4  --  2*  --   --    EOS PCT % 2 2 0  --  0    < > = values in this interval not displayed.       CHEMISTRY PROFILE:  Results from last 7 days   Lab Units 02/14/24 0437 02/13/24  0354 02/09/24  0533 02/07/24  1802   SODIUM mmol/L 134* 131* 131* 129*   POTASSIUM mmol/L 4.0 4.3 4.6 4.1   CHLORIDE mmol/L 93* 91* 94* 90*   CO2 mmol/L 31 30 28 28   BUN mg/dL 22 26* 19 12   CREATININE mg/dL 0.56* 0.64 0.62 0.62   ALBUMIN g/dL  --   --   --  3.8   CALCIUM mg/dL 9.6 9.6 9.4 9.5   ALK PHOS U/L  --   --   --  84   ALT U/L  --   --   --  16   AST U/L  --   --   --  25       Albumin:  0   Lab Value Date/Time    ALB 3.8 02/07/2024 1802     Imaging Studies: I have personally reviewed pertinent reports.  EKG, Pathology, and Other Studies: I have personally reviewed pertinent reports.    Counseling / Coordination of Care:  Total floor / unit time spent today 35 minutes. Greater than 50% of total time was spent with the patient and / or family counseling and / or coordination of care. A description of the counseling / coordination of care: symptom assessment and management, medication review, psychosocial support, chart review, imaging review, lab review, supportive listening, and anticipatory guidance.    Jb Maynard DO  [facility]  [phone]    Portions of this document may have been created using dictation software and as such some \"sound alike\" terms may have been generated by the system. Do not hesitate to contact me with any questions or clarifications.    "

## 2024-02-14 NOTE — ASSESSMENT & PLAN NOTE
Cancer related pain  Ongoing left shoulder pain exacerbated by movement or needing to lie flat on table.   -offered options of addition of Long-acting opioid (Patient adamantly against Fentanyl in any form). Discussed ER Oxycodone vs Morphine but patient would like to hold off on these options at this time.   -Discussed offer to increase frequency of PO Dilaudid 8mg to q4 hours ATC, however, patient decided to leave current regimen as is for now.    Continue APAP 975 mg q8 hours ATC  Continue 8mg q6 hours scheduled with hold parameters  Increase Gabapentin for neuropathic pain (left arm/elbow and left leg)  Gabapentin 100mg morning and afternoon, increase Gabapentin to 200mg qHS  Goal for 200mg TID if patient is tolerating daily increases  Discussed with patient's RN  Renal function reviewed - Cr 0.56, eGFR 110  Continue PO dilaudid 6 mg q4 hours PRN for moderate to severe pains  Continue IV Dilaudid 0.5 mg q4 hours PRN for BTP  Narcan on order for concerns for respiratory depression or need for opioid reversal

## 2024-02-14 NOTE — PROGRESS NOTES
[facility]  Progress Note  Name: Abdulaziz Gomez I  MRN: [id number]  Unit/Bed#: S -01 I Date of Admission: 2/7/2024   Date of Service: 2/14/2024 I Hospital Day: 7    Assessment/Plan   * Acute respiratory failure with hypoxia (HCC)  Assessment & Plan  Acute hypoxic respiratory failure  Multifactorial  Patient noted to have right pleural effusion, emphysematous lungs, lung cancer encasing distal trachea right bronchi, SVC  Status post pleural catheter placement with improvement   currently requiring 5 to 6 L nasal cannula  Wean oxygen as able  Anticipate will likely need home oxygen            Acute deep vein thrombosis (DVT) of both upper extremities ([facility])  Assessment & Plan  Today, patient was found to have significant swelling on his right upper extremity, thus, duplex scan was done.  Duplex scan results: Acute, nonocclusive DVT in the right subclavian vein, with superficial thrombophlebitis in the right cephalic vein; acute nonocclusive DVT of the left internal jugular and subclavian veins.  Remains on heparin drip  Will need to be transition to oral anticoagulation to complete at least 3 months of therapy  Follow-up further recommendations by pulmonology  Hopeful transition to oral anticoagulation next 24 to 48 hours     Severe protein-calorie malnutrition (HCC)  Assessment & Plan       Body mass index is 19.21 kg/m².   Encourage protein intake    COPD (chronic obstructive pulmonary disease) (HCC)  Assessment & Plan  Not in acute exacerbation  Continue to monitor    Hyponatremia  Assessment & Plan   has since improved and stabilized  Continue to monitor    Pleural effusion on right  Assessment & Plan  Large right pleural effusion noted on CT chest  Status post thoracocentesis per pulmonary  Pulmonary plans indwelling pleural catheter placement given recurrence of pleural effusion noted;      Status post pleural catheter placement on 2/13  Plan for every other day drainage by  "pulmonology  Will need home supplies set up  Follow-up with case management    Cancer related pain  Assessment & Plan  Imaging studies show extensive metastatic disease including bones  Palliative care on board   follow-up further recommendations regarding pain control    Metastatic non-small cell lung cancer (HCC)  Assessment & Plan  patient recently diagnosed with 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  CTA PE study : \"Redemonstration of tumor encasing the distal trachea and right bronchi extending into the anterior mediastinum and lower neck bilaterally encasing and narrowing of the brachiocephalic veins and SVC and occlusion of the right bronchi. Mild bilateral axillary lymphadenopathy, possibly metastatic. Liver metastases better shown on prior study.\"  Radiation oncology on board  Continue inpatient radiation    Follow up further recs     Current every day smoker  Assessment & Plan  Smoking cessation counseling provided         VTE Pharmacologic Prophylaxis:   Pharmacologic: Heparin Drip  Mechanical VTE Prophylaxis in Place: Yes  m    Patient Centered Rounds: I have performed bedside rounds with nursing staff today.    Discussions with Specialists or Other Care Team Provider: , cm, nursing    Education and Discussions with Family / Patient: pt, daughter    Time Spent for Care: 30 minutes.  More than 50% of total time spent on counseling and coordination of care as described above.    Current Length of Stay: 7 day(s)    Current Patient Status: Inpatient   Certification Statement: The patient will continue to require additional inpatient hospital stay due to see below    Discharge Plan: still requring further treatment of acute hypoxic respiratory failure and radiation therapy.  Anticipate at least 48 hours    Code Status: Level 1 - Full Code      Subjective:   Currently without any acute complaints.  Denies chest pain, shortness of breath, fevers, chills    Objective:     Vitals: "   Temp (24hrs), Av.7 °F (36.5 °C), Min:97.3 °F (36.3 °C), Max:98 °F (36.7 °C)    Temp:  [97.3 °F (36.3 °C)-98 °F (36.7 °C)] 97.3 °F (36.3 °C)  HR:  [102-108] 105  Resp:  [17-18] 17  BP: (133-165)/(71-93) 165/89  SpO2:  [90 %-94 %] 90 %  Body mass index is 19.21 kg/m².     Input and Output Summary (last 24 hours):     No intake or output data in the 24 hours ending 24 0937    Physical Exam:     Physical Exam  Constitutional:       General: He is not in acute distress.     Appearance: He is well-developed. He is not diaphoretic.   HENT:      Head: Normocephalic and atraumatic.      Nose: Nose normal.      Mouth/Throat:      Pharynx: No oropharyngeal exudate.   Eyes:      General: No scleral icterus.     Conjunctiva/sclera: Conjunctivae normal.   Cardiovascular:      Rate and Rhythm: Normal rate and regular rhythm.      Heart sounds: Normal heart sounds. No murmur heard.     No friction rub. No gallop.   Pulmonary:      Effort: Pulmonary effort is normal. No respiratory distress.      Breath sounds: Normal breath sounds. No wheezing or rales.   Chest:      Chest wall: No tenderness.   Abdominal:      General: Bowel sounds are normal. There is no distension.      Palpations: Abdomen is soft.      Tenderness: There is no abdominal tenderness. There is no guarding.   Musculoskeletal:         General: No tenderness or deformity. Normal range of motion.      Cervical back: Normal range of motion and neck supple.   Skin:     General: Skin is warm and dry.      Findings: No erythema.   Neurological:      Mental Status: He is alert. Mental status is at baseline.         Additional Data:     Labs:    Results from last 7 days   Lab Units 24  0437 24  0354   WBC Thousand/uL 8.77 7.86   HEMOGLOBIN g/dL 9.8* 9.8*   HEMATOCRIT % 29.8* 29.7*   PLATELETS Thousands/uL 243 220   BANDS PCT % 3  --    NEUTROS PCT %  --  65   LYMPHS PCT %  --  19   LYMPHO PCT % 18  --    MONOS PCT %  --  10   MONO PCT % 4  --    EOS  PCT % 2 2     Results from last 7 days   Lab Units 02/14/24  0437 02/09/24  0533 02/07/24  1802   SODIUM mmol/L 134*   < > 129*   POTASSIUM mmol/L 4.0   < > 4.1   CHLORIDE mmol/L 93*   < > 90*   CO2 mmol/L 31   < > 28   BUN mg/dL 22   < > 12   CREATININE mg/dL 0.56*   < > 0.62   ANION GAP mmol/L 10   < > 11   CALCIUM mg/dL 9.6   < > 9.5   ALBUMIN g/dL  --   --  3.8   TOTAL BILIRUBIN mg/dL  --   --  0.91   ALK PHOS U/L  --   --  84   ALT U/L  --   --  16   AST U/L  --   --  25   GLUCOSE RANDOM mg/dL 103   < > 107    < > = values in this interval not displayed.     Results from last 7 days   Lab Units 02/12/24  1909   INR  1.13                       * I Have Reviewed All Lab Data Listed Above.  * Additional Pertinent Lab Tests Reviewed: All Labs Within Last 24 Hours Reviewed    Imaging:    Imaging Reports Reviewed Today Include: na  Imaging Personally Reviewed by Myself Includes:  na    Recent Cultures (last 7 days):     Results from last 7 days   Lab Units 02/08/24  0937   GRAM STAIN RESULT  2+ Polys  No bacteria seen   BODY FLUID CULTURE, STERILE  No growth       Last 24 Hours Medication List:   Current Facility-Administered Medications   Medication Dose Route Frequency Provider Last Rate    acetaminophen  975 mg Oral Q8H SHANTAL Jb Maynard, DO      albuterol  2.5 mg Nebulization Q6H PRN Afsaneh Knapp MD      gabapentin  100 mg Oral TID Guera Hernández MD      heparin (porcine)  3-30 Units/kg/hr (Order-Specific) Intravenous Titrated Afsaneh Knapp MD 19 Units/kg/hr (02/14/24 0730)    heparin (porcine)  2,400 Units Intravenous Q6H PRN Afsaneh Knapp MD      heparin (porcine)  4,800 Units Intravenous Q6H PRN Afsaneh Knapp MD      HYDROmorphone  0.5 mg Intravenous Q4H PRN Jb Maynard, DO      HYDROmorphone  6 mg Oral Q4H PRN Jennifer Paige Bloch, CRNP      HYDROmorphone  8 mg Oral Q6H SCH Jennifer Paige Bloch, CRNP      ipratropium  0.5 mg Nebulization TID  Guera Hernández MD      levalbuterol  1.25 mg Nebulization TID Guera Hernández MD      lidocaine  2 patch Topical Daily Guera Hernández MD      lidocaine-epinephrine  20 mL Infiltration Once Alfredo Barrios,       menthol-methyl salicylate   Apply externally TID Guera Hernández MD      naloxone  0.04 mg Intravenous Q1MIN PRN Jb Maynard DO      nicotine  14 mg Transdermal Daily Guera Hernández MD      polyethylene glycol  17 g Oral Q12H Guera Hernández MD      senna-docusate sodium  2 tablet Oral BID Jb Maynard DO          Today, Patient Was Seen By: Lucius Collier MD    ** Please Note: Dictation voice to text software may have been used in the creation of this document. **

## 2024-02-14 NOTE — UTILIZATION REVIEW
Continued Stay Review    Date: 2/14/2024                        Current Patient Class: inpatient    Current Level of Care: med surg     HPI:63 y.o. male initially admitted on 2/7/2024     Assessment/Plan: acute respiratory failure on 6 L NC w metastatic non small cell lung CA w inpatient radiation RX Current smoker. Palliative care for recs for cancer related pain. Pulmonary consult for indwelling pleural catheter placement given recurrence of pleural effusion; placement done 2/13 w plan Q other day drainage by Pulmonary w IP CM follow up. On IV Heparin GTT due to acute DVT of both UE, will need transition to PO AC w at least 3 months of therapy w recs by Pulmonology. Likely will need home O2. Ongoing shoulder pain w Palliative care recs: Continue APAP 975 mg q8 hours ATC  Continue 8mg q6 hours scheduled with hold parameters  Continue gabapentin 100mg TID   Continue PO dilaudid 6 mg q4 hours PRN for moderate to severe pain  Continue IV Dilaudid 0.5 mg q4 hours PRN for BTP  Narcan on order for concerns for respiratory depression or need for opioid reversal   Currently full code after discussion w Palliative Care    Vital Signs:                Date/Time Temp Pulse Resp BP MAP (mmHg) SpO2 Calculated FIO2 (%) - Nasal Cannula Nasal Cannula O2 Flow Rate (L/min) O2 Device Patient Position - Orthostatic VS   02/14/24 15:39:16 97.5 °F (36.4 °C) 104 18 139/86 104 91 % -- -- -- --   02/14/24 1209 -- -- -- 164/84 -- -- -- -- -- --   02/14/24 1205 -- 108 Abnormal  -- -- -- 92 % -- -- -- --   02/14/24 07:24:38 97.3 °F (36.3 °C) Abnormal  105 17 165/89 114 90 % -- -- -- --   02/13/24 23:00:55 97.5 °F (36.4 °C) 102 17 159/93 115 94 % -- -- -- --   02/13/24 1930 -- -- -- -- -- 94 % 44 6 L/min Nasal cannula          Pertinent Labs/Diagnostic Results:       Results from last 7 days   Lab Units 02/14/24  0437 02/13/24  0354 02/12/24  1909 02/08/24  0532 02/07/24  1802   WBC Thousand/uL 8.77 7.86 8.98  --  8.23   HEMOGLOBIN g/dL 9.8*  "9.8* 9.7*  --  12.2   HEMATOCRIT % 29.8* 29.7* 30.0*  --  34.8*   PLATELETS Thousands/uL 243 220 213   < > 219   NEUTROS ABS Thousands/µL  --  5.22  --   --  6.14   BANDS PCT % 3  --  2  --   --     < > = values in this interval not displayed.         Results from last 7 days   Lab Units 02/14/24 0437 02/13/24 0354 02/09/24 0533 02/07/24  1802   SODIUM mmol/L 134* 131* 131* 129*   POTASSIUM mmol/L 4.0 4.3 4.6 4.1   CHLORIDE mmol/L 93* 91* 94* 90*   CO2 mmol/L 31 30 28 28   ANION GAP mmol/L 10 10 9 11   BUN mg/dL 22 26* 19 12   CREATININE mg/dL 0.56* 0.64 0.62 0.62   EGFR ml/min/1.73sq m 110 104 105 105   CALCIUM mg/dL 9.6 9.6 9.4 9.5     Results from last 7 days   Lab Units 02/07/24  1802   AST U/L 25   ALT U/L 16   ALK PHOS U/L 84   TOTAL PROTEIN g/dL 6.7   ALBUMIN g/dL 3.8   TOTAL BILIRUBIN mg/dL 0.91         Results from last 7 days   Lab Units 02/14/24 0437 02/13/24 0354 02/09/24 0533 02/07/24  1802   GLUCOSE RANDOM mg/dL 103 121 142* 107     Results from last 7 days   Lab Units 02/07/24 2000   OSMOLALITY, SERUM mmol/*         No results found for: \"BETA-HYDROXYBUTYRATE\"                   Results from last 7 days   Lab Units 02/07/24 2000 02/07/24  1802   HS TNI 0HR ng/L  --  27   HS TNI 2HR ng/L 28  --    HSTNI D2 ng/L 1  --          Results from last 7 days   Lab Units 02/14/24  1322 02/14/24  0451 02/13/24  1941 02/13/24 0354 02/12/24  1909 02/07/24  1802   PROTIME seconds  --   --   --   --  15.2* 14.3   INR   --   --   --   --  1.13 1.05   PTT seconds 103* 59* 59*   < > 193* 36    < > = values in this interval not displayed.     Results from last 7 days   Lab Units 02/07/24  1802   TSH 3RD GENERATON uIU/mL 1.760                     Results from last 7 days   Lab Units 02/07/24  1802   BNP pg/mL 57               Results from last 7 days   Lab Units 02/07/24 2000   OSMOLALITY, SERUM mmol/*       Results from last 7 days   Lab Units 02/08/24  0937   GRAM STAIN RESULT  2+ Polys  No " bacteria seen   BODY FLUID CULTURE, STERILE  No growth     Results from last 7 days   Lab Units 02/08/24  0937   TOTAL COUNTED  100   WBC FLUID /ul 1,673               Medications:   Scheduled Medications:  acetaminophen, 975 mg, Oral, Q8H SHANTAL  fluticasone, 1 spray, Each Nare, Daily  gabapentin, 100 mg, Oral, TID  HYDROmorphone, 8 mg, Oral, Q6H SHANTAL  ipratropium, 0.5 mg, Nebulization, TID  levalbuterol, 1.25 mg, Nebulization, TID  lidocaine, 2 patch, Topical, Daily  lidocaine-epinephrine, 20 mL, Infiltration, Once  menthol-methyl salicylate, , Apply externally, TID  nicotine, 14 mg, Transdermal, Daily  polyethylene glycol, 17 g, Oral, Q12H  senna-docusate sodium, 2 tablet, Oral, BID    Continuous IV Infusions:  heparin (porcine), 3-30 Units/kg/hr (Order-Specific), Intravenous, Titrated    PRN Meds:  albuterol, 2.5 mg, Nebulization, Q6H PRN  heparin (porcine), 2,400 Units, Intravenous, Q6H PRN  heparin (porcine), 4,800 Units, Intravenous, Q6H PRN  HYDROmorphone, 0.5 mg, Intravenous, Q4H PRN  HYDROmorphone, 6 mg, Oral, Q4H PRN  naloxone, 0.04 mg, Intravenous, Q1MIN PRN  sodium chloride, 1 spray, Each Nare, Q1H PRN    Discharge Plan: [facility] Utilization Review Department  ATTENTION: Please call with any questions or concerns to [phone] and carefully listen to the prompts so that you are directed to the right person. All voicemails are confidential.   For Discharge needs, contact Care Management DC Support Team at [phone] opt. [phone]  Send all requests for admission clinical reviews, approved or denied determinations and any other requests to dedicated fax number below belonging to the campus where the patient is receiving treatment. List of dedicated fax numbers for the Facilities:  FACILITY NAME UR FAX NUMBER   ADMISSION DENIALS (Administrative/Medical Necessity) [phone]   DISCHARGE SUPPORT TEAM (NETWORK) [phone]   PARENT CHILD HEALTH (Maternity/NICU/Pediatrics) [phone]   [facility]  [facility] [phone]   [facility] [phone]   [facility] [phone]   [facility] [phone]   [facility] [phone]   [facility] [phone]   [facility] [phone]   [facility] [phone]   [facility] [phone]   [facility] [phone]   [facility] [phone]   [facility] [phone]

## 2024-02-14 NOTE — ASSESSMENT & PLAN NOTE
Today, patient was found to have significant swelling on his right upper extremity, thus, duplex scan was done.  Duplex scan results: Acute, nonocclusive DVT in the right subclavian vein, with superficial thrombophlebitis in the right cephalic vein; acute nonocclusive DVT of the left internal jugular and subclavian veins.  Remains on heparin drip  Will need to be transition to oral anticoagulation to complete at least 3 months of therapy  Follow-up further recommendations by pulmonology  Hopeful transition to oral anticoagulation next 24 to 48 hours

## 2024-02-14 NOTE — ASSESSMENT & PLAN NOTE
Cancer related pain  Ongoing left shoulder pain exacerbated by movement or needing to lie flat on table.  No changes made to regimen today at patients request.     -offered options of addition of Long-acting opioid (Patient adamantly against Fentanyl in any form). Discussed ER Oxycodone vs Morphine but patient would like to hold off on these options at this time.   -Discussed offer to increase frequency of PO Dilaudid 8mg to q4 hours PRN, however, patient decided to leave current regimen as is for now.    Continue APAP 975 mg q8 hours ATC  Continue 8mg q6 hours scheduled with hold parameters  Continue gabapentin 100mg TID   Continue PO dilaudid 6 mg q4 hours PRN for moderate to severe pain  Continue IV Dilaudid 0.5 mg q4 hours PRN for BTP  Narcan on order for concerns for respiratory depression or need for opioid reversal

## 2024-02-14 NOTE — ASSESSMENT & PLAN NOTE
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  CTA PE study : \"Redemonstration of tumor encasing the distal trachea and right bronchi extending into the anterior mediastinum and lower neck bilaterally encasing and narrowing of the brachiocephalic veins and SVC and occlusion of the right bronchi. Mild bilateral axillary lymphadenopathy, possibly metastatic. Liver metastases better shown on prior study.\"  Radiation oncology on board  Continue inpatient radiation    Follow up further recs   "

## 2024-02-14 NOTE — ASSESSMENT & PLAN NOTE
Large right pleural effusion noted on CT chest  Status post thoracocentesis per pulmonary  Pulmonary plans indwelling pleural catheter placement given recurrence of pleural effusion noted;      Status post pleural catheter placement on 2/13  Plan for every other day drainage by pulmonology  Will need home supplies set up  Follow-up with case management

## 2024-02-15 NOTE — MALNUTRITION/BMI
This medical record reflects one or more clinical indicators suggestive of malnutrition     Malnutrition Findings:   Adult Malnutrition type: Chronic illness  Adult Degree of Malnutrition: Other severe protein calorie malnutrition  Malnutrition Characteristics: Fat loss, Muscle loss, Inadequate energy, Weight loss                  360 Statement: Other severe protein calorie malnutrition related to catabolic illness evidenced by < 75% energy intake versus needs for > 1 months resulting in 17#(11.9%) wt loss over past 4 months since 10/26/23; exhibiting moderate clavicle region muscle loss, moderate buccal fat pads loss. Treating with Regular diet and Ensure supplements TID    BMI Findings:           Body mass index is 18.72 kg/m².     See Nutrition note dated 02/15/2024   for additional details.  Completed nutrition assessment is viewable in the nutrition documentation.

## 2024-02-15 NOTE — PROGRESS NOTES
[facility]  Progress Note  Name: Abdulaziz Gomez I  MRN: [id number]  Unit/Bed#: S -01 I Date of Admission: 2/7/2024   Date of Service: 2/15/2024 I Hospital Day: 8    Assessment/Plan   Pleural effusion on right  Assessment & Plan  S/p Thoracentesis 2/13/2024 yielding 1000cc.    Palliative care patient  Assessment & Plan  Ongoing support provided with symptom monitoring.  -Palliative Social Work assisting in providing further support to patient and family.    Cancer related pain  Assessment & Plan  Cancer related pain  Ongoing left shoulder pain exacerbated by movement or needing to lie flat on table.   -offered options of addition of Long-acting opioid (Patient adamantly against Fentanyl in any form). Discussed ER Oxycodone vs Morphine but patient would like to hold off on these options at this time.   -Discussed offer to increase frequency of PO Dilaudid 8mg to q4 hours ATC, however, patient decided to leave current regimen as is for now.    Continue APAP 975 mg q8 hours ATC  Continue 8mg q6 hours scheduled with hold parameters  Increase Gabapentin for neuropathic pain (left arm/elbow and left leg)  Gabapentin 100mg morning and afternoon, increase Gabapentin to 200mg qHS  Goal for 200mg TID if patient is tolerating daily increases  Discussed with patient's RN  Renal function reviewed - Cr 0.56, eGFR 110  Continue PO dilaudid 6 mg q4 hours PRN for moderate to severe pains  Continue IV Dilaudid 0.5 mg q4 hours PRN for BTP  Narcan on order for concerns for respiratory depression or need for opioid reversal    Metastatic non-small cell lung cancer (HCC)  Assessment & Plan  Oncology following.  Plans underway for initiation of inpatient chemotherapy.    * Acute respiratory failure with hypoxia (HCC)  Assessment & Plan  Remains on Supplemental Oxygen. Currently 6L NC.         Care Coordination  Case discussed w/ patient's RN    Follow-up  We appreciate the opportunity to participate in  this patient's care.   We will continue to follow. [facility]SC to follow up.  Please do not hesitate to contact our on-call provider through our clinic answering service at [phone] should there be an acute change or other symptom control concerns.    Code status: Level 1 - Full Code   Decisional apparatus:  Patient does have capacity to make medical decisions on my exam today. If such capacity is lost, patient's substitute decision maker would default to daughter per [place] by [place] Act 169.   Advance Directive / Living Will / POLST:  Yes on file, daughter Soniya Cunningham designated as healthcare agent.    We appreciate the opportunity to participate in this patient's care. We will continue to follow. Please do not hesitate to contact our on-call provider through our clinic answering service at [phone] should you have acute symptom control concerns.    Subjective:  Patient seen sitting up in bed this afternoon eating lunch. Wife bedside.  No acute distress or discomfort.  Patient states he is still having pain particularly to the left shoulder, back and left thigh especially having to move for multiple providers and assistants coming to evaluate him.  States talking also can cause discomfort to his back as well at times. He does feel the pain medications lasts anywhere between 3-4 hours at times. He states the pain level is tolerable when he does get his dose of pain medication. Denies confusion or feeling sedate with this medication.  He did endorse good sleep last night in which he was finally able to sleep for 7 hours uninterrupted.  He feels he has a way to go to recover and improve his fatigue by getting more rest.   He remains tolerant of PO intake and passing flatus. However, has not had a bowel movement since Sunday. He states he needed to have a bowel movement yesterday but by the time he got to the bathroom he was in pain and very short of breath that he no longer had the sensation to have a bowel movement. We  discussed having a commode closer to his bed so that he can have easier access.  We reviewed his multi-modal analgesia medications including Gabapentin for his likely nerve related pain and he was in agreement to further increase on this. Will walk up dosing by 100mg per day for the next three days with the goal of 200mg TID. If he does see some benefit, we can further titrate as his renal function remains WNL at this time.    Review of Systems   Constitutional:  Positive for fatigue. Negative for appetite change, chills and fever.   HENT:  Negative for trouble swallowing.    Eyes:  Negative for visual disturbance.   Respiratory:  Positive for cough and shortness of breath (stable, improves with catheter drainage of fluid).    Cardiovascular:  Negative for chest pain and leg swelling.   Gastrointestinal:  Negative for abdominal pain, constipation, diarrhea, nausea and vomiting.   Genitourinary:  Negative for difficulty urinating and dysuria.   Musculoskeletal:  Positive for arthralgias (left elbow, left knee/thigh) and back pain.   Neurological:  Negative for dizziness and light-headedness.        Admits to paresthesias and sharp pain to left elbow and left thigh/knee   Psychiatric/Behavioral:  Negative for confusion and sleep disturbance.    All other systems reviewed and are negative.      MEDICATIONS / ALLERGIES:  all current active meds have been reviewed, current meds:   Current Facility-Administered Medications   Medication Dose Route Frequency    acetaminophen (TYLENOL) tablet 975 mg  975 mg Oral Q8H SHANTAL    albuterol inhalation solution 2.5 mg  2.5 mg Nebulization Q6H PRN    alteplase (CATHFLO) injection 2 mg  2 mg Intracatheter Q1MIN PRN    CARBOplatin (PARAPLATIN) 575.5 mg in sodium chloride 0.9 % 250 mL IVPB  575.5 mg Intravenous Once    cyanocobalamin (VITAMIN B-12) tablet 1,000 mcg  1,000 mcg Oral Daily    dexamethasone (DECADRON) tablet 4 mg  4 mg Oral Q12H [facility]    fluticasone (FLONASE) 50 mcg/act nasal  spray 1 spray  1 spray Each Nare Daily    folic acid (FOLVITE) tablet 1 mg  1 mg Oral Daily    fosaprepitant (EMEND) 150 mg in sodium chloride 0.9 % 250 mL IVPB  150 mg Intravenous Once    gabapentin (NEURONTIN) capsule 100 mg  100 mg Oral BID (AM & Afternoon)    gabapentin (NEURONTIN) capsule 200 mg  200 mg Oral HS    heparin (porcine) 25,000 units in 0.45% NaCl 250 mL infusion (premix)  3-30 Units/kg/hr (Order-Specific) Intravenous Titrated    heparin (porcine) injection 2,400 Units  2,400 Units Intravenous Q6H PRN    heparin (porcine) injection 4,800 Units  4,800 Units Intravenous Q6H PRN    HYDROmorphone (DILAUDID) injection 0.5 mg  0.5 mg Intravenous Q4H PRN    HYDROmorphone (DILAUDID) tablet 6 mg  6 mg Oral Q4H PRN    HYDROmorphone (DILAUDID) tablet 8 mg  8 mg Oral Q6H SHANTAL    ipratropium (ATROVENT) 0.02 % inhalation solution 0.5 mg  0.5 mg Nebulization TID    levalbuterol (XOPENEX) inhalation solution 1.25 mg  1.25 mg Nebulization TID    lidocaine (LIDODERM) 5 % patch 2 patch  2 patch Topical Daily    lidocaine-epinephrine (XYLOCAINE/EPINEPHRINE) 1 %-1:100,000 injection 20 mL  20 mL Infiltration Once    menthol-methyl salicylate (BENGAY) 10-15 % cream   Apply externally TID    naloxone (NARCAN) 0.04 mg/mL syringe 0.04 mg  0.04 mg Intravenous Q1MIN PRN    nicotine (NICODERM CQ) 14 mg/24hr TD 24 hr patch 14 mg  14 mg Transdermal Daily    ondansetron (ZOFRAN) 16 mg, dexamethasone (DECADRON) 10 mg in sodium chloride 0.9 % 50 mL IVPB   Intravenous Once    PEMEtrexed (ALIMTA) 688 mg in sodium chloride 0.9 % 100 mL chemo infusion  400 mg/m2 (Treatment Plan Recorded) Intravenous Once    polyethylene glycol (MIRALAX) packet 17 g  17 g Oral Q12H    senna-docusate sodium (SENOKOT S) 8.6-50 mg per tablet 2 tablet  2 tablet Oral BID    sodium chloride (OCEAN) 0.65 % nasal spray 1 spray  1 spray Each Nare Q1H PRN    sodium chloride 0.9 % infusion  20 mL/hr Intravenous Once   , and PTA meds:   Prior to Admission Medications  "  Prescriptions Last Dose Informant Patient Reported? Taking?   HYDROmorphone HCl ER 8 MG TB24 2/7/2024  No Yes   Sig: Take 1 tablet by mouth in the morning Max Daily Amount: 1 tablet   albuterol (Proventil HFA) 90 mcg/act inhaler Not Taking Self No No   Sig: Inhale 2 puffs every 6 (six) hours as needed for wheezing   Patient not taking: Reported on 1/3/2024   oxyCODONE-acetaminophen (Percocet) 5-325 mg per tablet 2/6/2024  No Yes   Sig: Take 1 tablet by mouth every 6 (six) hours as needed for moderate pain Max Daily Amount: 4 tablets      Facility-Administered Medications: None       No Known Allergies    OBJECTIVE:  /83   Pulse (!) 109   Temp 97.7 °F (36.5 °C)   Resp 18   Ht 5' 9\" (1.753 m)   Wt 57.5 kg (126 lb 12.2 oz)   SpO2 (!) 87%   BMI 18.72 kg/m²   Nursing notes reviewed.  Physical Exam  Vitals reviewed.   Constitutional:       General: He is not in acute distress.     Appearance: He is ill-appearing. He is not toxic-appearing or diaphoretic.      Interventions: He is not intubated.  HENT:      Head: Normocephalic and atraumatic.      Mouth/Throat:      Mouth: Mucous membranes are moist.   Eyes:      Conjunctiva/sclera: Conjunctivae normal.   Cardiovascular:      Rate and Rhythm: Tachycardia present.   Pulmonary:      Effort: Pulmonary effort is normal. No tachypnea, bradypnea, accessory muscle usage or respiratory distress. He is not intubated.   Abdominal:      Palpations: Abdomen is soft.      Tenderness: There is no abdominal tenderness.   Musculoskeletal:         General: No swelling.      Right lower leg: No tenderness. No edema.      Left lower leg: No tenderness. No edema.   Skin:     General: Skin is warm and dry.      Coloration: Skin is not cyanotic or pale.   Neurological:      General: No focal deficit present.      Mental Status: He is alert. He is disoriented.   Psychiatric:         Mood and Affect: Mood normal. Mood is not anxious.         Behavior: Behavior normal. Behavior is " "not agitated.         Lab Results: I have personally reviewed pertinent labs.    HEMATOLOGY PROFILE:  Results from last 7 days   Lab Units 02/14/24 0437 02/13/24  0354 02/12/24  1909   WBC Thousand/uL 8.77 7.86 8.98   HEMOGLOBIN g/dL 9.8* 9.8* 9.7*   HEMATOCRIT % 29.8* 29.7* 30.0*   PLATELETS Thousands/uL 243 220 213   NEUTROS PCT %  --  65  --    MONOS PCT %  --  10  --    MONO PCT % 4  --  2*   EOS PCT % 2 2 0       CHEMISTRY PROFILE:  Results from last 7 days   Lab Units 02/14/24  0437 02/13/24  0354 02/09/24  0533   SODIUM mmol/L 134* 131* 131*   POTASSIUM mmol/L 4.0 4.3 4.6   CHLORIDE mmol/L 93* 91* 94*   CO2 mmol/L 31 30 28   BUN mg/dL 22 26* 19   CREATININE mg/dL 0.56* 0.64 0.62   CALCIUM mg/dL 9.6 9.6 9.4       Albumin:  0   Lab Value Date/Time    ALB 3.8 02/07/2024 1802     Imaging Studies: I have personally reviewed pertinent reports.  EKG, Pathology, and Other Studies: I have personally reviewed pertinent reports.    Counseling / Coordination of Care:  Total floor / unit time spent today 40 minutes. Greater than 50% of total time was spent with the patient and / or family counseling and / or coordination of care. A description of the counseling / coordination of care: symptom assessment and management, medication review, medication adjustment, psychosocial support, chart review, imaging review, lab review, supportive listening, and anticipatory guidance.    Jb Maynard DO  [facility]  [phone]    Portions of this document may have been created using dictation software and as such some \"sound alike\" terms may have been generated by the system. Do not hesitate to contact me with any questions or clarifications.      "

## 2024-02-15 NOTE — ASSESSMENT & PLAN NOTE
Acute hypoxic respiratory failure  Multifactorial  Patient noted to have right pleural effusion, emphysematous lungs, lung cancer encasing distal trachea right bronchi, SVC  Status post pleural catheter placement with improvement  Remains on 5 to 6 L nasal cannula  Anticipate will likely need home oxygen on discharge

## 2024-02-15 NOTE — ASSESSMENT & PLAN NOTE
Large right pleural effusion noted on CT chest  Status post thoracocentesis per pulmonary  Pulmonary plans indwelling pleural catheter placement given recurrence of pleural effusion noted;     Status post pleural catheter placement on 2/13  Plan for drainage by pulmonology today  Plan for every other day drainage  Will need home supplies set up  Follow-up with case management

## 2024-02-15 NOTE — PROGRESS NOTES
Progress Note - Pulmonary   Abdulaziz Gomez 63 y.o. male MRN: [id number]  Unit/Bed#: S -01 Encounter: [id number]    Assessment/Plan:    Acute hypoxic respiratory failure, multifactorial due to listed below  -91% on 6 L NC  -continue to maintain saturations greater than 89%  -Pulmonary hygiene encouraged: Deep breathing with cough, OOB as tolerated, incentive spirometry and flutter valve as tolerated  -Assess home O2 requirements prior to discharge     Right-sided pleural effusion  -Thoracentesis completed 02/08/2024 1 L clear yellow fluid removed, exudative in nature neutrophilic predominant 88%  -Cytology pending  -IPC filter placed Tuesday 02/13/24 by Dr Barrios.  Pt tolerated well.  1 L clear yellow fluid drained  -Drained today at bedside with daughter and wife present.  500 cc dark yellow fluid obtained.  Will drain again tomorrow at bedside and allow family to do it     Stage IV adenocarcinoma of the lung  -Newly diagnosed  -PET scan 02/07/2024 IMPRESSION:   1. FDG avid intrathoracic disease most extensive at the superior mediastinum and right perihilar region concerning for malignancy.  2. Extensive FDG avid peggy disease in the lower neck, chest and abdomen suspicious for metastasis.  3. Scattered FDG-avid hepatic lesions concerning for metastasis.  4. Innumerable FDG avid osseous lesions compatible with widespread osseous metastasis.  5. A few small foci of FDG uptake along the left anterior thigh musculature for which intramuscular metastasis is not excluded.  -Medical oncology and radiation oncology following  -Radiation treatments started 2/13/2024, 2/10 treatments completed  -Will start chemotherapy on 2/16/2024 withpemetrexed 400mg/m2, Carboplatin AC % concurrent with radiation therapy    Nicotine dependence  -Was a 1 pack a day smoker but has decreased to 3 to 4 cigarettes  -Smoking cessation discussed  -NRT options reviewed    Chief Complaint:    I feel much better     Subjective:    Patient  "seen and examined at bedside.  Patient sitting up comfortably in bed.  Wife and daughter present.  IPC catheter drained.  500 dark yellow fluid obtained.  Reviewed procedure for draining and replacing dressing when completed with patient, wife and daughter.  Patient had some discomfort when drainage was completed.  Oxygen saturation stable at 92%.  Denies fever chills or hemoptysis    Objective:    Vitals: Blood pressure 128/95, pulse (!) 111, temperature (!) 97.4 °F (36.3 °C), resp. rate 18, height 5' 9\" (1.753 m), weight 57.5 kg (126 lb 12.2 oz), SpO2 92%.6 L NC,Body mass index is 18.72 kg/m².      Intake/Output Summary (Last 24 hours) at 2/15/2024 1045  Last data filed at 2/15/2024 0759  Gross per 24 hour   Intake --   Output 350 ml   Net -350 ml       Invasive Devices       Peripheral Intravenous Line  Duration             Peripheral IV 02/13/24 Right Forearm 2 days    Peripheral IV 02/14/24 Left;Ventral (anterior) Forearm <1 day              Drain  Duration             Pleural Effusion Long-Term Catheter 16 Fr. 1 day                    Physical Exam:     Physical Exam  Constitutional:       General: He is not in acute distress.     Appearance: He is ill-appearing.   HENT:      Head: Normocephalic and atraumatic.      Right Ear: External ear normal.      Left Ear: External ear normal.      Nose: Nose normal.      Mouth/Throat:      Mouth: Mucous membranes are moist.      Pharynx: Oropharynx is clear.   Eyes:      Extraocular Movements: Extraocular movements intact.      Pupils: Pupils are equal, round, and reactive to light.   Cardiovascular:      Rate and Rhythm: Normal rate and regular rhythm.      Pulses: Normal pulses.   Pulmonary:      Effort: Pulmonary effort is normal.      Breath sounds: Rales present. No wheezing or rhonchi.      Comments: Bilateral rales noted diminished breath sounds on the right base  Abdominal:      General: Bowel sounds are normal.      Tenderness: There is no abdominal tenderness. " "There is no rebound.   Musculoskeletal:         General: Normal range of motion.      Cervical back: Normal range of motion.      Right lower leg: No edema.      Left lower leg: No edema.   Skin:     General: Skin is warm and dry.   Neurological:      Mental Status: He is alert and oriented to person, place, and time.   Psychiatric:         Mood and Affect: Mood normal.         Behavior: Behavior normal.         Thought Content: Thought content normal.         Judgment: Judgment normal.         Labs: I have personally reviewed pertinent lab results., ABG: No results found for: \"PHART\", \"IXF0MHG\", \"PO2ART\", \"NZW0PXI\", \"W7WXVBBH\", \"BEART\", \"SOURCE\", BNP: No results found for: \"BNP\", CBC: No results found for: \"WBC\", \"HGB\", \"HCT\", \"MCV\", \"PLT\", \"ADJUSTEDWBC\", \"RBC\", \"MCH\", \"MCHC\", \"RDW\", \"MPV\", \"NRBC\", CMP: No results found for: \"SODIUM\", \"K\", \"CL\", \"CO2\", \"ANIONGAP\", \"BUN\", \"CREATININE\", \"GLUCOSE\", \"CALCIUM\", \"AST\", \"ALT\", \"ALKPHOS\", \"PROT\", \"BILITOT\", \"EGFR\"        Imaging and other studies: I have personally reviewed pertinent reports.    "

## 2024-02-15 NOTE — QUICK NOTE
Radiation Oncology Treatment Note     A treatment dose of 300 cGy was administered today to the involved tumor site(s) Mediastinum/C7-T2 using 1-10 MV photons.  Present total dose at this time is 900  cGy.  Approximately 7 more treatment before completion of treatments or critical review.     DISPLAY PLAN FREE TEXT

## 2024-02-15 NOTE — QUICK NOTE
Attempted to visit patient this morning for symptom check but patient was at Radiation treatment.  Will re-visit later this afternoon to check on patient.    Jb Maynard, DO  Palliative Care

## 2024-02-15 NOTE — PHYSICAL THERAPY NOTE
PHYSICAL THERAPY NOTE          Patient Name: Abdulaziz Gomez  Today's Date: 2/15/2024         02/15/24 1000   PT Last Visit   PT Visit Date 02/15/24   Note Type   Note Type Treatment   Pain Assessment   Pain Assessment Tool 0-10   Pain Score 10 - Worst Possible Pain   Pain Location/Orientation Orientation: Left;Location: Shoulder;Location: Hip   Pain Onset/Description Onset: Ongoing   Effect of Pain on Daily Activities limits comfort and amctivity tolerance   Patient's Stated Pain Goal No pain   Hospital Pain Intervention(s) Repositioned;Ambulation/increased activity;Emotional support;Rest  (RN made aware of pt pain rating)   Multiple Pain Sites Yes  (L shoulder L hip)   Pain Rating: FLACC (Rest) - Face 1   Pain Rating: FLACC (Rest) - Legs 0   Pain Rating: FLACC (Rest) - Activity 1   Pain Rating: FLACC (Rest) - Cry 1   Pain Rating: FLACC (Rest) - Consolability 1   Score: FLACC (Rest) 4   Restrictions/Precautions   Weight Bearing Precautions Per Order No   Other Precautions Chair Alarm;Bed Alarm;Multiple lines;O2;Fall Risk;Pain  (6L NC 02)   General   Chart Reviewed Yes   Response to Previous Treatment Patient with no complaints from previous session.   Family/Caregiver Present Yes  (spouse and daughter present in todays tx session)   Cognition   Overall Cognitive Status WFL   Arousal/Participation Alert;Responsive;Cooperative   Attention Within functional limits   Orientation Level Oriented X4   Memory Within functional limits   Following Commands Follows multistep commands with increased time or repetition   Comments pt continues to be pleasant, cooperative and motivated in todays tx session   Subjective   Subjective pt stated 10/10 pain post tx session L hip, L shoulder   Bed Mobility   Supine to Sit 5  Supervision   Additional items Assist x 1;HOB elevated;Bedrails;Increased time required;Verbal cues   Sit to Supine 4  Minimal  assistance   Additional items Assist x 1;HOB elevated;Bedrails;Increased time required;Verbal cues;LE management   Additional Comments pt was able to sit EOB w/o LOB but had decreased Sp02 85% and required a therapeutic seated rest break in order to allow Sp02 to increase >90%   Transfers   Sit to Stand 5  Supervision   Additional items Assist x 1;Increased time required;Verbal cues  (w/ RW)   Stand to Sit 5  Supervision   Additional items Assist x 1;Armrests;Increased time required;Verbal cues  (w/ RW)   Stand pivot 5  Supervision   Additional items Assist x 1;Armrests;Increased time required;Verbal cues  (w/ RW)   Additional Comments pt was able to complete multiple functional transfers in todays tx session all with /s and VC's for hand placement for increased safety and balance   Ambulation/Elevation   Gait pattern Decreased foot clearance;Foward flexed;Short stride;Step to;Excessively slow;Decreased hip extension  (continued decrease WB through UE's due to increased L shoulder pain)   Gait Assistance 4  Minimal assist   Additional items Assist x 1;Verbal cues   Assistive Device Rolling walker   Distance 12'x2 RW   Stair Management Assistance 4  Minimal assist   Additional items Assist x 1;Verbal cues;Increased time required   Stair Management Technique Two rails;Step to pattern;Foreward;Backward   Number of Stairs 4   Balance   Static Sitting Good   Dynamic Sitting Fair +   Static Standing Fair   Dynamic Standing Fair   Ambulatory Poor +  (w/ RW)   Endurance Deficit   Endurance Deficit Yes   Endurance Deficit Description limited activity tolerance, ambulation distance   Activity Tolerance   Activity Tolerance Patient limited by fatigue;Patient limited by pain;Other (Comment)  (decreased Sp02 mid 80's)   Nurse Made Aware Spoke to RN Edite   Exercises   Hip Abduction Sitting;15 reps;AROM;Bilateral   Hip Adduction Sitting;15 reps;AROM;Bilateral   Knee AROM Long Arc Quad Sitting;15 reps;AROM;Bilateral   Ankle Pumps  Sitting;20 reps;AROM;Bilateral   Marching Sitting;10 reps;AROM;Bilateral   Assessment   Problem List Decreased strength;Decreased endurance;Impaired balance;Decreased mobility;Decreased safety awareness;Pain   Assessment pt began tx session lying supine in the bed and was agreeable to participate in PT intervention. Education was provide to pt and spouse prior to PT intervention on bed mobility, transfer training, ambulation and stair trials. pt continues to remain consistant with requiring /s to complete a supine<>sit EOB transfer but required min Ax1 w/ LE management in order to return to supine position. Progress was noted in todays tx session with functional transfers to and from RW as pt was able to complete multiple transfers with /s but required VC's for hand placement in order to increase safety and balance. pt does continues to be limited with activity tolerance, functional mobility and stair trials as pt required several therapeutic seated rest breaks in todays tx session due to increased pain and decreased Sp02 mid 80's. pt was limited to 12'x2 RW of ambulation w/ min ax1 for safety and balance. pt was able to participate in TE activities while seated in recliner in order to strengthen LE's in efforts in reducing risk for falls and injuries. Post tx pt in bed with call bell and family present. pt would benefit rom continued skilled PT intervetion in order to increase activity tolerance, ambulation distance and steps completed as pt has 6 NOY.   Goals   Patient Goals to be more active   STG Expiration Date 02/22/24   Plan   Treatment/Interventions ADL retraining;Functional transfer training;LE strengthening/ROM;Therapeutic exercise;Endurance training;Elevations;Patient/family training;Equipment eval/education;Bed mobility;Gait training;Spoke to nursing;Compensatory technique education   Progress Slow progress, decreased activity tolerance   PT Frequency 3-5x/wk   Discharge Recommendation   Rehab Resource  Intensity Level, PT III (Minimum Resource Intensity)   Equipment Recommended Walker   Walker Package Recommended Wheeled walker   AM-PAC Basic Mobility Inpatient   Turning in Flat Bed Without Bedrails 3   Lying on Back to Sitting on Edge of Flat Bed Without Bedrails 3   Moving Bed to Chair 3   Standing Up From Chair Using Arms 3   Walk in Room 3   Climb 3-5 Stairs With Railing 3   Basic Mobility Inpatient Raw Score 18   Basic Mobility Standardized Score 41.05   Highest Level Of Mobility   JH-HLM Goal 6: Walk 10 steps or more   JH-HLM Achieved 6: Walk 10 steps or more   Education   Education Provided Mobility training;Assistive device;Other  (transfer and stair trials)   Patient Demonstrates acceptance/verbal understanding   End of Consult   Patient Position at End of Consult Supine;Bed/Chair alarm activated;All needs within reach   The patient's AM-PAC Basic Mobility Inpatient Short Form Raw Score is 18. A Raw score of greater than 16 suggests the patient may benefit from discharge to home. Please also refer to the recommendation of the Physical Therapist for safe discharge planning.    Rich Villalba

## 2024-02-15 NOTE — ASSESSMENT & PLAN NOTE
Today, patient was found to have significant swelling on his right upper extremity, thus, duplex scan was done.  Duplex scan results: Acute, nonocclusive DVT in the right subclavian vein, with superficial thrombophlebitis in the right cephalic vein; acute nonocclusive DVT of the left internal jugular and subclavian veins.  Continue heparin drip for now  Will need to be transition to oral anticoagulation prior to discharge to complete at least 3 months of therapy  Follow-up further recommendations by pulmonology

## 2024-02-15 NOTE — ASSESSMENT & PLAN NOTE
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  CTA PE study : \"Redemonstration of tumor encasing the distal trachea and right bronchi extending into the anterior mediastinum and lower neck bilaterally encasing and narrowing of the brachiocephalic veins and SVC and occlusion of the right bronchi. Mild bilateral axillary lymphadenopathy, possibly metastatic. Liver metastases better shown on prior study.\"  Radiation oncology on board  Continue inpatient radiation  Oncology consult as well  Will initiate chemotherapy on 2/16   Follow up further recs   "

## 2024-02-15 NOTE — SOCIAL WORK
Palliative LSW saw patient at the bedside today. LSW appreciates the opportunity to provide patient/family with inpatient emotional support and guidance while patient continues to receive medical attention from the medical team.     Topics discussed: PSC team met with pt/family at bedside for continued support. Pt reports radiation went ok today, however he is still experiencing significant pain in his back and L shoulder/hip. Pt has found his pain is increased with movement (especially having to sit up and down in bed frequently throughout the day when speaking with medical providers) which has limited his ability to rest during the day. Support and validation of pt's feelings provided as he reflected on challenges of being hospitalized. Pt also experiencing swelling in his arms. Dr. Maynard reviewed current medication regimen and provided guidance/recommendations. Pt/family in agreement with increasing Gabapentin at this time. Pt/family remain disease-focused-- anticipating pt to receive chemo tomorrow 2/16 and will continue receiving radiation tx's.      Areas that need follow-up: Emotional Support  Resources given: None  Others present: Pt's wife and PSC team      I have spent 30 minutes with Patient and family today in which greater than 50% of this time was spent in counseling/coordination of care regarding Counseling / Coordination of care.     LSW will continue to follow as requested by the medical team, patient, or family

## 2024-02-15 NOTE — CASE MANAGEMENT
Case Management Progress Note    Patient name Abdulaziz Gomez  Location S /S -01 MRN [id number]  : 1960 Date 2/15/2024       LOS (days): 8  Geometric Mean LOS (GMLOS) (days): 3.6  Days to GMLOS:-4.2        OBJECTIVE:        Current admission status: Inpatient  Preferred Pharmacy:   [id number] - [place], [place] - [address]  [address] [address]  Phone: [phone] Fax: [phone]    Primary Care Provider: Genesis Leonard MD    Primary Insurance: BLUE CROSS  Secondary Insurance:     PROGRESS NOTE:    Weekly Care Management Length of Stay Review     Current LOS: 8 Days    Most Recent Labs:     Lab Results   Component Value Date/Time    WBC 8.77 2024 04:37 AM    HGB 9.8 (L) 2024 04:37 AM    HCT 29.8 (L) 2024 04:37 AM     2024 04:37 AM    BANDSPCT 3 2024 04:37 AM    SODIUM 134 (L) 2024 04:37 AM    K 4.0 2024 04:37 AM    CL 93 (L) 2024 04:37 AM    CO2 31 2024 04:37 AM    BUN 22 2024 04:37 AM    CREATININE 0.56 (L) 2024 04:37 AM    GLUC 103 2024 04:37 AM    INR 1.13 2024 07:09 PM       Most Recent Vitals:   Vitals:    02/15/24 1511   BP: 140/83   Pulse: (!) 109   Resp: 18   Temp: 97.7 °F (36.5 °C)   SpO2: (!) 87%        Identified Barriers to Discharge/Discharge Goals/Care Management Interventions: new cancer dx, pulm following, home o2 eval    Intended Discharge Disposition: [facility]-decl[facility]. Spouse to provide  care. RW, BSC ordered. Home O2 eval pending    Expected Discharge Date:

## 2024-02-15 NOTE — PLAN OF CARE
Problem: PHYSICAL THERAPY ADULT  Goal: Performs mobility at highest level of function for planned discharge setting.  See evaluation for individualized goals.  Description: Treatment/Interventions: ADL retraining, Functional transfer training, LE strengthening/ROM, Elevations, Therapeutic exercise, Endurance training, Patient/family training, Equipment eval/education, Bed mobility, Gait training, Compensatory technique education, Spoke to case management, OT  Equipment Recommended: Walker       See flowsheet documentation for full assessment, interventions and recommendations.  Outcome: Progressing  Note:    Problem List: Decreased strength, Decreased endurance, Impaired balance, Decreased mobility, Decreased safety awareness, Pain  Assessment: pt began tx session lying supine in the bed and was agreeable to participate in PT intervention. Education was provide to pt and spouse prior to PT intervention on bed mobility, transfer training, ambulation and stair trials. pt continues to remain consistant with requiring /s to complete a supine<>sit EOB transfer but required min Ax1 w/ LE management in order to return to supine position. Progress was noted in todays tx session with functional transfers to and from RW as pt was able to complete multiple transfers with /s but required VC's for hand placement in order to increase safety and balance. pt does continues to be limited with activity tolerance, functional mobility and stair trials as pt required several therapeutic seated rest breaks in todays tx session due to increased pain and decreased Sp02 mid 80's. pt was limited to 12'x2 RW of ambulation w/ min ax1 for safety and balance. pt was able to participate in TE activities while seated in recliner in order to strengthen LE's in efforts in reducing risk for falls and injuries. Post tx pt in bed with call bell and family present. pt would benefit rom continued skilled PT intervetion in order to increase activity  tolerance, ambulation distance and steps completed as pt has 6 NOY.        Rehab Resource Intensity Level, PT: III (Minimum Resource Intensity)    See flowsheet documentation for full assessment.

## 2024-02-15 NOTE — QUICK NOTE
Radiation Oncology Treatment Note     A treatment dose of 300 cGy was administered today to the involved tumor site(s)mediastinum/ [id number] 1-10 MV photons.  Present total dose at this time is 600  cGy.  Approximately 8 more treatment before completion of treatments or critical review.

## 2024-02-15 NOTE — ASSESSMENT & PLAN NOTE
Imaging studies show extensive metastatic disease including bones  Palliative care on board  Currently is on scheduled oral Dilaudid 8 mg every 6 hours   follow-up further recommendations regarding pain control

## 2024-02-15 NOTE — OCCUPATIONAL THERAPY NOTE
Occupational Therapy Cancelled Session    Patient Name: Abdulaziz Gomez  Today's Date: 2/15/2024     02/15/24 1525   Note Type   Note Type Cancelled Session   Cancel Reasons Other  (Attempted to see pt for OT treatment session this afternoon however pt soundly sleeping upon arrival. Wife present who reports pt made good progress w/ PT earlier today and wished to let pt rest at this time. Will continue to follow.)     Yasmin Matute, OTD, OTR/L  [place] License [id number]  [place] License [id number]

## 2024-02-15 NOTE — PROGRESS NOTES
[facility]  Progress Note  Name: Abdulaziz Gomez I  MRN: [id number]  Unit/Bed#: S -01 I Date of Admission: 2/7/2024   Date of Service: 2/15/2024 I Hospital Day: 8    Assessment/Plan   * Acute respiratory failure with hypoxia (HCC)  Assessment & Plan  Acute hypoxic respiratory failure  Multifactorial  Patient noted to have right pleural effusion, emphysematous lungs, lung cancer encasing distal trachea right bronchi, SVC  Status post pleural catheter placement with improvement  Remains on 5 to 6 L nasal cannula  Anticipate will likely need home oxygen on discharge            Acute deep vein thrombosis (DVT) of both upper extremities (HCC)  Assessment & Plan  Today, patient was found to have significant swelling on his right upper extremity, thus, duplex scan was done.  Duplex scan results: Acute, nonocclusive DVT in the right subclavian vein, with superficial thrombophlebitis in the right cephalic vein; acute nonocclusive DVT of the left internal jugular and subclavian veins.  Continue heparin drip for now  Will need to be transition to oral anticoagulation prior to discharge to complete at least 3 months of therapy  Follow-up further recommendations by pulmonology      Left shoulder pain  Assessment & Plan  Left shoulder pain, pain radiating to little finger  Cervical spine reveals features of cervical spine stenosis metastatic disease possibly contributing to symptoms  Continue gabapentin  PT/OT    Severe protein-calorie malnutrition (HCC)  Assessment & Plan       Body mass index is 18.72 kg/m².   Continue to encourage caloric intake    Pleural effusion on right  Assessment & Plan  Large right pleural effusion noted on CT chest  Status post thoracocentesis per pulmonary  Pulmonary plans indwelling pleural catheter placement given recurrence of pleural effusion noted;     Status post pleural catheter placement on 2/13  Plan for drainage by pulmonology today  Plan for every other day  "drainage  Will need home supplies set up  Follow-up with case management    Cancer related pain  Assessment & Plan  Imaging studies show extensive metastatic disease including bones  Palliative care on board  Currently is on scheduled oral Dilaudid 8 mg every 6 hours   follow-up further recommendations regarding pain control    Metastatic non-small cell lung cancer (HCC)  Assessment & Plan  patient recently diagnosed with 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  CTA PE study : \"Redemonstration of tumor encasing the distal trachea and right bronchi extending into the anterior mediastinum and lower neck bilaterally encasing and narrowing of the brachiocephalic veins and SVC and occlusion of the right bronchi. Mild bilateral axillary lymphadenopathy, possibly metastatic. Liver metastases better shown on prior study.\"  Radiation oncology on board  Continue inpatient radiation  Oncology consult as well  Will initiate chemotherapy on 2/16   Follow up further recs            VTE Pharmacologic Prophylaxis:   Pharmacologic: Heparin Drip  Mechanical VTE Prophylaxis in Place: Yes    Patient Centered Rounds: I have performed bedside rounds with nursing staff today.    Discussions with Specialists or Other Care Team Provider: cm, nursing    Education and Discussions with Family / Patient: pt, daughter    Time Spent for Care: 30 minutes.  More than 50% of total time spent on counseling and coordination of care as described above.    Current Length of Stay: 8 day(s)    Current Patient Status: Inpatient   Certification Statement: The patient will continue to require additional inpatient hospital stay due to see below    Discharge Plan: Still requiring radiation therapy and chemotherapy as well as further treatment of acute respiratory failure.  Anticipate at least 48-72 hrs    Code Status: Level 1 - Full Code      Subjective:   Denies chest pain, shortness of breath, cough, fevers, chills    Objective: "     Vitals:   Temp (24hrs), Av.5 °F (36.4 °C), Min:97.4 °F (36.3 °C), Max:97.5 °F (36.4 °C)    Temp:  [97.4 °F (36.3 °C)-97.5 °F (36.4 °C)] 97.4 °F (36.3 °C)  HR:  [] 111  Resp:  [18] 18  BP: (128-164)/(84-95) 128/95  SpO2:  [89 %-94 %] 92 %  Body mass index is 18.72 kg/m².     Input and Output Summary (last 24 hours):       Intake/Output Summary (Last 24 hours) at 2/15/2024 0905  Last data filed at 2/15/2024 0759  Gross per 24 hour   Intake --   Output 350 ml   Net -350 ml       Physical Exam:     Physical Exam  Constitutional:       General: He is not in acute distress.     Appearance: He is well-developed. He is not diaphoretic.   HENT:      Head: Normocephalic and atraumatic.      Nose: Nose normal.      Mouth/Throat:      Pharynx: No oropharyngeal exudate.   Eyes:      General: No scleral icterus.     Conjunctiva/sclera: Conjunctivae normal.   Cardiovascular:      Rate and Rhythm: Normal rate and regular rhythm.      Heart sounds: Normal heart sounds. No murmur heard.     No friction rub. No gallop.   Pulmonary:      Effort: Pulmonary effort is normal. No respiratory distress.      Breath sounds: Normal breath sounds. No wheezing or rales.   Chest:      Chest wall: No tenderness.   Abdominal:      General: Bowel sounds are normal. There is no distension.      Palpations: Abdomen is soft.      Tenderness: There is no abdominal tenderness. There is no guarding.   Musculoskeletal:         General: No tenderness or deformity. Normal range of motion.      Cervical back: Normal range of motion and neck supple.   Skin:     General: Skin is warm and dry.      Findings: No erythema.   Neurological:      Mental Status: He is alert. Mental status is at baseline.       (    Additional Data:     Labs:    Results from last 7 days   Lab Units 24  0437 24  0354   WBC Thousand/uL 8.77 7.86   HEMOGLOBIN g/dL 9.8* 9.8*   HEMATOCRIT % 29.8* 29.7*   PLATELETS Thousands/uL 243 220   BANDS PCT % 3  --    NEUTROS  PCT %  --  65   LYMPHS PCT %  --  19   LYMPHO PCT % 18  --    MONOS PCT %  --  10   MONO PCT % 4  --    EOS PCT % 2 2     Results from last 7 days   Lab Units 02/14/24  0437   SODIUM mmol/L 134*   POTASSIUM mmol/L 4.0   CHLORIDE mmol/L 93*   CO2 mmol/L 31   BUN mg/dL 22   CREATININE mg/dL 0.56*   ANION GAP mmol/L 10   CALCIUM mg/dL 9.6   GLUCOSE RANDOM mg/dL 103     Results from last 7 days   Lab Units 02/12/24  1909   INR  1.13                       * I Have Reviewed All Lab Data Listed Above.  * Additional Pertinent Lab Tests Reviewed: All Labs Within Last 24 Hours Reviewed    Imaging:    Imaging Reports Reviewed Today Include: na  Imaging Personally Reviewed by Myself Includes:  na    Recent Cultures (last 7 days):     Results from last 7 days   Lab Units 02/08/24  0937   GRAM STAIN RESULT  2+ Polys  No bacteria seen   BODY FLUID CULTURE, STERILE  No growth       Last 24 Hours Medication List:   Current Facility-Administered Medications   Medication Dose Route Frequency Provider Last Rate    acetaminophen  975 mg Oral Q8H [facility] Jb Maynard DO      albuterol  2.5 mg Nebulization Q6H PRN Afsaneh Knapp MD      alteplase  2 mg Intracatheter Q1MIN PRN Margo Kearns MD      CARBOplatin (PARAPLATIN) 575.5 mg in sodium chloride 0.9 % 250 mL IVPB  575.5 mg Intravenous Once Margo Kearns MD      cyanocobalamin  1,000 mcg Oral Daily Margo Kearns MD      dexamethasone  4 mg Oral Q12H [facility] Margo Kearns MD      fluticasone  1 spray Each Nare Daily Alfredo Barrios DO      folic acid  1 mg Oral Daily Margo Kearns MD      fosaprepitant (EMEND) 150 mg in sodium chloride 0.9 % 250 mL IVPB  150 mg Intravenous Once Margo Kearns MD      gabapentin  100 mg Oral TID Guera Hernández MD      heparin (porcine)  3-30 Units/kg/hr (Order-Specific) Intravenous Titrated Afsaneh Knapp MD 15 Units/kg/hr (02/15/24 6342)    heparin (porcine)  2,400 Units Intravenous Q6H PRN Afsaneh Knapp  MD      heparin (porcine)  4,800 Units Intravenous Q6H PRN Afsaneh Knapp MD      HYDROmorphone  0.5 mg Intravenous Q4H PRN Jb Shiu, DO      HYDROmorphone  6 mg Oral Q4H PRN Jennifer Paige Bloch, CRNP      HYDROmorphone  8 mg Oral Q6H [facility] Jennifer Paige Bloch, CRNP      ipratropium  0.5 mg Nebulization TID Guera Hernández MD      levalbuterol  1.25 mg Nebulization TID Guera Hernández MD      lidocaine  2 patch Topical Daily Guera Hernández MD      lidocaine-epinephrine  20 mL Infiltration Once Alfredo Barrios,       menthol-methyl salicylate   Apply externally TID Guera Hernández MD      naloxone  0.04 mg Intravenous Q1MIN PRN Jb Maynard, DO      nicotine  14 mg Transdermal Daily Guera Hernández MD      ondansetron (ZOFRAN) 16 mg, dexamethasone (DECADRON) 10 mg in sodium chloride 0.9 % 50 mL IVPB   Intravenous Once Margo Kearns MD      PEMEtrexed (ALIMTA) 688 mg in sodium chloride 0.9 % 100 mL chemo infusion  400 mg/m2 (Treatment Plan Recorded) Intravenous Once Margo Kearns MD      polyethylene glycol  17 g Oral Q12H Guera Hernández MD      senna-docusate sodium  2 tablet Oral BID Jb Maynard, DO      sodium chloride  1 spray Each Nare Q1H PRN Alfredo Barrios,       sodium chloride  20 mL/hr Intravenous Once Margo Kearns MD          Today, Patient Was Seen By: Lucius Collier MD    ** Please Note: Dictation voice to text software may have been used in the creation of this document. **

## 2024-02-15 NOTE — ASSESSMENT & PLAN NOTE
Ongoing support provided with symptom monitoring.  -Palliative Social Work assisting in providing further support to patient and family.

## 2024-02-16 PROBLEM — K59.00 CONSTIPATION: Status: ACTIVE | Noted: 2024-02-16

## 2024-02-16 NOTE — PHYSICAL THERAPY NOTE
Physical Therapy Cancellation Note             02/16/24 1015   PT Last Visit   PT Visit Date 02/16/24   Note Type   Note Type Cancelled Session   Cancel Reasons Other  (chemo treatment)   Assessment   Assessment pt is currently recieving chemo treatment and will be recieving radiation at 2:15 this afternoon. PT will attempt to see pt as PT schedule will allow     Rich Villalba                                                                                 stretcher

## 2024-02-16 NOTE — QUICK NOTE
Radiation Oncology Treatment Note     A treatment dose of 300 cGy was administered today to the involved tumor site(s) mediastinum/C7-T2 using 1-10 MV photons.  Present total dose at this time is 1200  cGy.  Approximately 6  more treatment before completion of treatments or critical review.

## 2024-02-16 NOTE — PROGRESS NOTES
"Progress Note - Pulmonary   Abdulaziz Burtonr 63 y.o. male MRN: [id number]  Unit/Bed#: S -01 Encounter: [id number]      Assessment & Recommendations:  Acute hypoxic respiratory failure  Multifactorial to include pleural effusion, lung cancer with possible lymphangitic spread, and emphysema  Titrate O2 to keep SpO2 >90%, IS, OOB-chair  Continue VEST and flutter therapy for now, prn NT suction     NSCLCa Stage IV - diffuse osseous metastasis, hepatic lesions  Initiation of XRT, chemotherapy starting today - cardoplatin  HPM consultation for cancer associated pain     Malignant right pleural effusion  TPC placed on right 2/13 and drained 1000cc  Plan for repeat drainage today then plan every other day - Will need disposal by chemotherapy nurse  Will arrange with DME company home drainage bottles once closer to discharge - please let use know 48hrs prior to start process  Will need sutures out in 7-10 days from placement     Suspected COPD w/o AE  PFTs as outpatient  Continue xopenex/atrovent TID for now  Hold on systemic steroids     Hemoptysis - resolved, continue to monitor     Nicotine dependence - tobacco cessation, NRT     Right SC and Left IJ DVT- cont UFH gtt - consider transition to DOAC      Nasal congestion - cont flonase and nasal saline    Subjective:   Producing some sputum, used VEST this am, unclear if helped clear, no hemoptysis, felt improved after drainage yesterday TPC 500cc.  Denied pleurisy    Objective:     Vitals: Blood pressure 168/63, pulse 99, temperature (!) 97.4 °F (36.3 °C), resp. rate 16, height 5' 9\" (1.753 m), weight 56.4 kg (124 lb 5.4 oz), SpO2 (!) 83%., 93% 6LNC during exam, Body mass index is 18.36 kg/m².      Intake/Output Summary (Last 24 hours) at 2/16/2024 1025  Last data filed at 2/16/2024 0908  Gross per 24 hour   Intake --   Output 500 ml   Net -500 ml         Physical Exam  Gen: Older frail man, awake, alert, oriented x 3, no acute distress  HEENT: Mucous membranes moist, " no oral lesions, no thrush, hoarse voice  NECK: No accessory muscle use, JVP not elevated  Cardiac: Regular, single S1, single S2, no murmurs, no rubs, no gallops  Lungs: TPC dressing in place, lungs mild central rhonchi, moist cough, no wheeze or rales appreciated  Abdomen: normoactive bowel sounds, soft nontender, nondistended, no rebound or rigidity, no guarding  Extremities: no cyanosis, no clubbing, R>L upper ext edema    Labs: I have personally reviewed pertinent lab results.  Laboratory and Diagnostics  Results from last 7 days   Lab Units 02/16/24  0430 02/14/24  0437 02/13/24  0354 02/12/24  1909   WBC Thousand/uL 8.22 8.77 7.86 8.98   HEMOGLOBIN g/dL 8.6* 9.8* 9.8* 9.7*   HEMATOCRIT % 26.0* 29.8* 29.7* 30.0*   PLATELETS Thousands/uL 263 243 220 213   NEUTROS PCT %  --   --  65  --    BANDS PCT %  --  3  --  2   MONOS PCT %  --   --  10  --    MONO PCT % 10 4  --  2*   EOS PCT % 0 2 2 0     Results from last 7 days   Lab Units 02/16/24  0430 02/14/24  0437 02/13/24  0354   SODIUM mmol/L 134* 134* 131*   POTASSIUM mmol/L 4.4 4.0 4.3   CHLORIDE mmol/L 94* 93* 91*   CO2 mmol/L 30 31 30   ANION GAP mmol/L 10 10 10   BUN mg/dL 24 22 26*   CREATININE mg/dL 0.52* 0.56* 0.64   CALCIUM mg/dL 9.5 9.6 9.6   GLUCOSE RANDOM mg/dL 127 103 121          Results from last 7 days   Lab Units 02/16/24  0313 02/15/24  1939 02/15/24  1256 02/15/24  0408 02/14/24  2149 02/14/24  1322 02/14/24  0451 02/13/24  0354 02/12/24  1909   INR   --   --   --   --   --   --   --   --  1.13   PTT seconds 64* 93* 49* 95* 65* 103* 59*   < > 193*    < > = values in this interval not displayed.      Right pleural fluid 2/8  TP 3.6, , Diff 88%, (+) metastatic carcinoma      Microbiology:  Right pleural fluid 2/8  NGTD     Imaging and other studies: New images and reports personally reviewed  CXR 2/13 - post TPC in good position, no PTX, improved pleural effusion, persistent right HD elevation     Upper Ext US 2/12 - R SC vein DVT and left  IJ thrombus and SC veins     CXR 2/10 - right basilar opacity mixed effusion/atelectasis     CT angio 2/7 - no PE, large right effusion, ground glass infiltrate RML, right hilar tumor encasing distal trachea and right bronchi as well as encasing BC veins and SVC, hepatic metastasis and axillary adenopathy      Alfredo Barrios DO, FACP  [facility]

## 2024-02-16 NOTE — PHYSICAL THERAPY NOTE
PHYSICAL THERAPY NOTE          Patient Name: Abdulaziz Gomez  Today's Date: 2/16/2024 02/16/24 1230   PT Last Visit   PT Visit Date 02/16/24   Note Type   Note Type Treatment   Pain Assessment   Pain Assessment Tool 0-10   Pain Score 10 - Worst Possible Pain   Pain Onset/Description Onset: Ongoing   Effect of Pain on Daily Activities limits comfort and activity tolerance   Patient's Stated Pain Goal No pain   Hospital Pain Intervention(s) Repositioned;Ambulation/increased activity;Emotional support;Rest   Multiple Pain Sites No   Pain Rating: FLACC (Rest) - Face 1   Pain Rating: FLACC (Rest) - Legs 0   Pain Rating: FLACC (Rest) - Activity 1   Pain Rating: FLACC (Rest) - Cry 1   Pain Rating: FLACC (Rest) - Consolability 1   Score: FLACC (Rest) 4   Restrictions/Precautions   Weight Bearing Precautions Per Order No   Other Precautions Chair Alarm;Bed Alarm;O2;Fall Risk;Pain  (6L NC 02)   General   Chart Reviewed Yes   Response to Previous Treatment Patient with no complaints from previous session.   Family/Caregiver Present Yes  (spouse)   Cognition   Overall Cognitive Status WFL   Arousal/Participation Alert;Responsive;Cooperative   Attention Within functional limits   Orientation Level Oriented X4   Memory Within functional limits   Following Commands Follows multistep commands with increased time or repetition   Comments pt continues to be pleasant, cooperatiev and motivated throughout PT intervention   Subjective   Subjective pt was agreeable to participate in PT intervention. pt stated 10/10 pain L arm ( shoulder post tx session RN made aware   Bed Mobility   Supine to Sit 6  Modified independent   Additional items Assist x 1;HOB elevated;Bedrails;Increased time required   Sit to Supine Unable to assess   Additional Comments pt seated OOB in madeleine recliner post tx session   Transfers   Sit to Stand 5  Supervision    Additional items Assist x 1;Increased time required;Verbal cues   Stand to Sit 5  Supervision   Additional items Assist x 1;Armrests;Increased time required;Verbal cues   Stand pivot 5  Supervision   Additional items Assist x 1;Armrests;Increased time required;Verbal cues   Additional Comments pt continues to require RW for all functional transfers in todays tx session with /s and VC's for hand placement   Ambulation/Elevation   Gait pattern Decreased foot clearance;Foward flexed;Short stride;Excessively slow   Gait Assistance 5  Supervision   Additional items Assist x 1;Verbal cues   Assistive Device Rolling walker   Distance 50'x1 RW   Stair Management Assistance 4  Minimal assist   Additional items Assist x 1;Verbal cues;Increased time required   Stair Management Technique Two rails;Step to pattern;Foreward;Backward   Number of Stairs 6   Ambulation/Elevation Additional Comments Additional steps not appropriate due to 10/10 L arm ( shoulder pain)   Balance   Static Sitting Good   Dynamic Sitting Fair +   Static Standing Fair   Dynamic Standing Fair -   Ambulatory Fair -  (w/ RW)   Endurance Deficit   Endurance Deficit Yes   Endurance Deficit Description limited activity tolerance and ambulation distance   Activity Tolerance   Activity Tolerance Patient limited by fatigue;Patient limited by pain   Nurse Made Aware Spoke to RN Skylar   Exercises   Hip Abduction Sitting;15 reps;AROM;Bilateral   Hip Adduction Sitting;15 reps;AROM;Bilateral  (pillow squeezes)   Knee AROM Long Arc Quad Sitting;15 reps;AROM;Bilateral   Ankle Pumps Sitting;20 reps;AROM;Bilateral   Marching Sitting;15 reps;AROM;Bilateral   Assessment   Problem List Decreased strength;Decreased endurance;Impaired balance;Decreased mobility;Decreased safety awareness;Pain   Assessment pt began tx session lying supine in [facility] bed and was agreeable to participate in PT intervention. PT intervention to focus on bed mobility, transfer training, TE activities,  posture/balance w/ gait and stair trials. Since last PT intervention progress was noted as pt was able to complete bed mobility w/ mod I, no VC's required. pt continues to remain consistant with /s for all functional transfers to and from RW w/o LOB. pt continues to be limited with activity tolerance and ambulation distance due to increased L arm ( shoulder) pain 10/10 RN made aware. pt was able to increase ambulation distance to 50'x1 RW. pt again educatyed w/ verbal/visual demonstration on all stair trials strategies prior to initiating steps. pt completed 6 steps with bilateral hand rails and min ax1 for increased safety and balance. pt was able to participate in TE activities >10 minutes in order to strengthen Le's in efforts in reducing risk for falls and injuries. pt would benefit from continued skilled PT intervention in order to address defiicts listed above. Post tx pt in recliner with call bell, RN made aware and all pt needs met   Goals   Patient Goals to get stronger   STG Expiration Date 02/22/24   PT Treatment Day 3   Plan   Treatment/Interventions ADL retraining;Functional transfer training;LE strengthening/ROM;Elevations;Therapeutic exercise;Endurance training;Patient/family training;Equipment eval/education;Bed mobility;Gait training;Spoke to nursing;Compensatory technique education   Progress Slow progress, decreased activity tolerance   PT Frequency 3-5x/wk   Discharge Recommendation   Rehab Resource Intensity Level, PT III (Minimum Resource Intensity)   Equipment Recommended Walker   Walker Package Recommended Wheeled walker   AM-PAC Basic Mobility Inpatient   Turning in Flat Bed Without Bedrails 3   Lying on Back to Sitting on Edge of Flat Bed Without Bedrails 4   Moving Bed to Chair 3   Standing Up From Chair Using Arms 3   Walk in Room 3   Climb 3-5 Stairs With Railing 3   Basic Mobility Inpatient Raw Score 19   Basic Mobility Standardized Score 42.48   Highest Level Of Mobility   -[facility] Goal  6: Walk 10 steps or more   -HLM Achieved 7: Walk 25 feet or more   Education   Education Provided Mobility training;Assistive device;Other  (functional transfers and stair trials)   Patient Demonstrates acceptance/verbal understanding   End of Consult   Patient Position at End of Consult Bedside chair;Bed/Chair alarm activated;All needs within reach   The patient's AM-PAC Basic Mobility Inpatient Short Form Raw Score is 19. A Raw score of greater than 16 suggests the patient may benefit from discharge to home. Please also refer to the recommendation of the Physical Therapist for safe discharge planning.    Rich Villalba

## 2024-02-16 NOTE — ASSESSMENT & PLAN NOTE
Cancer related pain  Ongoing left shoulder pain exacerbated by movement but improved.  -Left thigh and hip pain improved with placement of Lidocaine patch at the left hip.   -offered options of addition of Long-acting opioid (Patient adamantly against Fentanyl in any form). Discussed ER Oxycodone vs Morphine but patient would like to hold off on these options at this time.   -Discussed offer to increase frequency of PO Dilaudid 8mg to q4 hours ATC, however, patient decided to leave current regimen as is for now.    Continue APAP 975 mg q8 hours ATC  Continue 8mg q6 hours scheduled with hold parameters  Further increase of Gabapentin today for neuropathic pain (left arm/elbow and left leg) as patient endorses fair improvement with the increase yesterday (2/15/2024)  Increase afternoon dose of Gabapentin to 200 mg today  Gabapentin 100mg in the morning, 200 mg in the afternoon and at bedtime  If patient tolerates today with benefit, can increase tomorrow to Gabapentin 200 mg TID  Renal function reviewed - Cr 0.56, eGFR 110  Continue PO dilaudid 6 mg q4 hours PRN for moderate to severe pains  Continue IV Dilaudid 0.5 mg q4 hours PRN for BTP  Narcan on order for concerns for respiratory depression or need for opioid reversal

## 2024-02-16 NOTE — OCCUPATIONAL THERAPY NOTE
Occupational Therapy Cancelled Session    Patient Name: Abdulaziz Gomez  Today's Date: 2/16/2024 02/16/24 0843   Note Type   Note Type Cancelled Session   Cancel Reasons Other  (Attempted to see pt for OT treatment session however pt reports he is wanting to conserve his energy today because he has 3 hrs of chemo and raditation later today. Pt agreeable to attempt to work with therapy over the weekend if able.)     Yasmin Matute, CHACHOD, OTR/L  [place] License [id number]  [place] License [id number]

## 2024-02-16 NOTE — ASSESSMENT & PLAN NOTE
Ongoing support provided with symptom monitoring.  -Palliative Social Work assisting in providing further support to patient and family.    Palliative Care will return in person on 2/19/2024.

## 2024-02-16 NOTE — CASE MANAGEMENT
Case Management Progress Note    Patient name Abdulaziz Gomez  Location S /S -01 MRN [id number]  : 1960 Date 2024       LOS (days): 9  Geometric Mean LOS (GMLOS) (days): 3.6  Days to GMLOS:-5        OBJECTIVE:        Current admission status: Inpatient  Preferred Pharmacy:   [id number] - [place], [place] - [address]  [address]  [place] [address]  Phone: [phone] Fax: [phone]    Primary Care Provider: Genesis Leonard MD    Primary Insurance: BLUE CROSS  Secondary Insurance:     PROGRESS NOTE:    CM confirmed with patient and spouse at bedside that the RW and BSC were delivered at bedside by rep. Curtis from [employer] yesterday afternoon -- They are very appreciative.     CM reviewed that patient will discharge with tunneled pleural catheters (IPC) which will need to be drained at home. CM spoke with patient and spouse at bedside to discuss same -- Spouse reports feeling comfortable managing this independently with continued education from bedside RN's. Both spouse and her daughter have observed & been educated on the drainage already -- As per spouse, daughter lives <1 minute from their home and can be called upon for support at anytime. Neither patient nor spouse want a visiting nurse (VNA) arranged for assistance with drainage.    Plan remains for patient's discharge home with / support from spouse/family. No VNA (SN or PT/OT) arrangements have been made, per their request.     CM awaiting home O2 evaluation and will place orders via [employer] as appropriate.    CM will continue to follow.

## 2024-02-16 NOTE — PROGRESS NOTES
[facility]  Progress Note  Name: Abdulaziz Gomez I  MRN: [id number]  Unit/Bed#: S -01 I Date of Admission: 2/7/2024   Date of Service: 2/17/2024 I Hospital Day: 10    Assessment/Plan   Constipation  Assessment & Plan  Last bowel movement 5 days ago.  Consider Relistor tomorrow if still not having bowel movement  Continue bowel regimen for today    Acute deep vein thrombosis (DVT) of both upper extremities (HCC)  Assessment & Plan  Acute nonocclusive DVT in right subclavian vein and left IJ in setting of multiple provoking factors.    Continue heparin drip  Eliquis sent to pharmacy, price needed to be confirmed with case management  Consider transitioning to Eliquis 10 mg twice daily tomorrow for 2 more days to complete total of 7-day aggressive anticoagulation followed by 5 mg twice daily for at least 3 months of therapy  Follow-up pulmonology and oncology outpatient    Left shoulder pain  Assessment & Plan  Left shoulder pain, pain radiating to little finger  Cervical spine reveals features of cervical spine stenosis metastatic disease possibly contributing to symptoms  Continue gabapentin  PT/OT    Severe protein-calorie malnutrition (HCC)  Assessment & Plan       Body mass index is 18.36 kg/m².   Continue to encourage caloric intake    COPD (chronic obstructive pulmonary disease) (HCC)  Assessment & Plan  Not in acute exacerbation  Continue to monitor    Hyponatremia  Assessment & Plan   has since improved and stabilized  Continue to monitor    Malignant pleural effusion  Assessment & Plan  Malignant right pleural effusion likely in setting of underlying metastatic lung cancer s/p TPC placement on 2/13/2024.  Plan for drainage every other day  Will require home drainage bottles upon discharge  Pulmonary following will need to notify 48 hours prior to discharge for arranging home drainage bottles  Removal of sutures in 7 to 10 days from placement    Cancer related pain  Assessment  "& Plan  Imaging studies show extensive metastatic disease including bones  Palliative care on board  Continue gabapentin  Currently is on scheduled oral Dilaudid 8 mg every 6 hours  Continue p.o. Dilaudid 6 mg every 4 hours as needed and IV Dilaudid 0.5 mg every 4 hours as needed  Bowel regimen in place   follow-up further recommendations regarding pain control    Metastatic non-small cell lung cancer (HCC)  Assessment & Plan  patient recently diagnosed with 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  CTA PE study : \"Redemonstration of tumor encasing the distal trachea and right bronchi extending into the anterior mediastinum and lower neck bilaterally encasing and narrowing of the brachiocephalic veins and SVC and occlusion of the right bronchi. Mild bilateral axillary lymphadenopathy, possibly metastatic. Liver metastases better shown on prior study.\"  Radiation oncology on board started on inpatient radiation with plan of total 10 sessions, can be continued outpatient as well  Oncology following plan to start chemotherapy  2/16/2024    Current every day smoker  Assessment & Plan  Smoking cessation counseling provided    * Acute respiratory failure with hypoxia (HCC)  Assessment & Plan  Likely multifactorial in setting of pleural effusion, lung cancer with possible lymphangitic spread, emphysema.  Currently on 6 L.  Has significantly improved since presentation.    Home O2 evaluation on discharge           VTE Pharmacologic Prophylaxis:   Pharmacologic: Heparin Drip  Mechanical VTE Prophylaxis in Place: Yes    Patient Centered Rounds: I have performed bedside rounds with nursing staff today.    Discussions with Specialists or Other Care Team Provider: cm, nursing    Education and Discussions with Family / Patient: pt, daughter    Time Spent for Care: 30 minutes.  More than 50% of total time spent on counseling and coordination of care as described above.    Current Length of Stay: 10 " day(s)    Current Patient Status: Inpatient   Certification Statement: The patient will continue to require additional inpatient hospital stay due to see below    Discharge Plan: Still requiring radiation therapy and chemotherapy as well as further treatment of acute respiratory failure.  Anticipate at least 48-72 hrs    Code Status: Level 1 - Full Code      Subjective:   Denies any acute complaints.  Tolerating chemotherapy well.    Objective:     Vitals:   Temp (24hrs), Av.4 °F (36.3 °C), Min:97.2 °F (36.2 °C), Max:97.5 °F (36.4 °C)    Temp:  [97.2 °F (36.2 °C)-97.5 °F (36.4 °C)] 97.3 °F (36.3 °C)  HR:  [] 92  Resp:  [16-18] 18  BP: (125-168)/(63-82) 129/65  SpO2:  [83 %-94 %] 87 %  Body mass index is 18.36 kg/m².     Input and Output Summary (last 24 hours):       Intake/Output Summary (Last 24 hours) at 2024 0016  Last data filed at 2024  Gross per 24 hour   Intake --   Output 800 ml   Net -800 ml       Physical Exam:     Physical Exam  Constitutional:       General: He is not in acute distress.     Appearance: He is well-developed. He is not diaphoretic.   HENT:      Head: Normocephalic and atraumatic.      Nose: Nose normal.      Mouth/Throat:      Pharynx: No oropharyngeal exudate.   Eyes:      General: No scleral icterus.     Conjunctiva/sclera: Conjunctivae normal.   Cardiovascular:      Rate and Rhythm: Normal rate and regular rhythm.      Heart sounds: Normal heart sounds. No murmur heard.     No friction rub. No gallop.   Pulmonary:      Effort: Pulmonary effort is normal. No respiratory distress.      Breath sounds: Normal breath sounds. No wheezing or rales.   Chest:      Chest wall: No tenderness.   Abdominal:      General: Bowel sounds are normal. There is no distension.      Palpations: Abdomen is soft.      Tenderness: There is no abdominal tenderness. There is no guarding.   Musculoskeletal:         General: No tenderness or deformity. Normal range of motion.      Cervical  back: Normal range of motion and neck supple.   Skin:     General: Skin is warm and dry.      Findings: No erythema.   Neurological:      Mental Status: He is alert. Mental status is at baseline.         Additional Data:     Labs:    Results from last 7 days   Lab Units 02/16/24  0430 02/14/24  0437 02/13/24  0354   WBC Thousand/uL 8.22 8.77 7.86   HEMOGLOBIN g/dL 8.6* 9.8* 9.8*   HEMATOCRIT % 26.0* 29.8* 29.7*   PLATELETS Thousands/uL 263 243 220   BANDS PCT %  --  3  --    NEUTROS PCT %  --   --  65   LYMPHS PCT %  --   --  19   LYMPHO PCT % 13* 18  --    MONOS PCT %  --   --  10   MONO PCT % 10 4  --    EOS PCT % 0 2 2     Results from last 7 days   Lab Units 02/16/24  0430   SODIUM mmol/L 134*   POTASSIUM mmol/L 4.4   CHLORIDE mmol/L 94*   CO2 mmol/L 30   BUN mg/dL 24   CREATININE mg/dL 0.52*   ANION GAP mmol/L 10   CALCIUM mg/dL 9.5   GLUCOSE RANDOM mg/dL 127     Results from last 7 days   Lab Units 02/12/24  1909   INR  1.13                       * I Have Reviewed All Lab Data Listed Above.  * Additional Pertinent Lab Tests Reviewed: All Labs Within Last 24 Hours Reviewed    Imaging:    Imaging Reports Reviewed Today Include: na  Imaging Personally Reviewed by Myself Includes:  na    Recent Cultures (last 7 days):             Last 24 Hours Medication List:   Current Facility-Administered Medications   Medication Dose Route Frequency Provider Last Rate    acetaminophen  975 mg Oral Q8H [facility] Jb Maynard, DO      albuterol  2.5 mg Nebulization Q6H PRN Afsaneh Knapp MD      alteplase  2 mg Intracatheter Q1MIN PRN Margo Kearns MD      cyanocobalamin  1,000 mcg Oral Daily Margo Kearns MD      dexamethasone  4 mg Oral Q12H [facility] Margo Kearns MD      fluticasone  1 spray Each Nare Daily Alfredo Barrios, DO      folic acid  1 mg Oral Daily Margo Kearns MD      gabapentin  100 mg Oral QAM Jb Maynard, DO      gabapentin  200 mg Oral BID Jb Maynard, DO      heparin (porcine)  3-30 Units/kg/hr  (Order-Specific) Intravenous Titrated Afsaneh Knapp MD 17 Units/kg/hr (02/16/24 3263)    heparin (porcine)  2,400 Units Intravenous Q6H PRN Afsaneh Knapp MD      heparin (porcine)  4,800 Units Intravenous Q6H PRN Afsaneh Knapp MD      HYDROmorphone  0.5 mg Intravenous Q4H PRN Jb Shiu, DO      HYDROmorphone  6 mg Oral Q4H PRN Jennifer Paige Bloch, CRNP      HYDROmorphone  8 mg Oral Q6H [facility] Jennifer Paige Bloch, CRNP      ipratropium  0.5 mg Nebulization TID Guera Hernández MD      levalbuterol  1.25 mg Nebulization TID Guera Hernández MD      lidocaine  2 patch Topical Daily Guera Hernández MD      lidocaine-epinephrine  20 mL Infiltration Once Alfredo Barrios, DO      menthol-methyl salicylate   Apply externally TID Guera Hernández MD      naloxone  0.04 mg Intravenous Q1MIN PRN Jb Maynard, DO      nicotine  14 mg Transdermal Daily Guera Hernández MD      polyethylene glycol  17 g Oral Q12H Guera Hernández MD      senna-docusate sodium  2 tablet Oral BID Jb Shiu, DO      sodium chloride  1 spray Each Nare Q1H PRN Alfredo Barrios, DO      sodium chloride  20 mL/hr Intravenous Once Margo Kearns MD          Today, Patient Was Seen By: Nico Hercules MD    ** Please Note: Dictation voice to text software may have been used in the creation of this document. **

## 2024-02-16 NOTE — ASSESSMENT & PLAN NOTE
Last bowel movement 5 days ago.  Discussed options of rectal suppository or enema as a potential option if patient has no bowel movement.  -Discussed potential for Relistor injection to help with potentially OIC.   -Patient states he has been passing a lot of flatus and would like to try today before having to resort to a suppository or Relistor at this time.   -Will continue monitor and patient would prefer to try a suppository first if no bowel movement today.

## 2024-02-16 NOTE — PLAN OF CARE

## 2024-02-16 NOTE — PROGRESS NOTES
[facility]  Progress Note  Name: Abdulaziz Gomez I  MRN: [id number]  Unit/Bed#: S -01 I Date of Admission: 2/7/2024   Date of Service: 2/16/2024 I Hospital Day: 9    Assessment/Plan   Constipation  Assessment & Plan  Last bowel movement 5 days ago.  Discussed options of rectal suppository or enema as a potential option if patient has no bowel movement.  -Discussed potential for Relistor injection to help with potentially OIC.   -Patient states he has been passing a lot of flatus and would like to try today before having to resort to a suppository or Relistor at this time.   -Will continue monitor and patient would prefer to try a suppository first if no bowel movement today.    Shortness of breath  Assessment & Plan  Pulmonology following.  -patient states this has improved and remains stable today.    Palliative care patient  Assessment & Plan  Ongoing support provided with symptom monitoring.  -Palliative Social Work assisting in providing further support to patient and family.    Palliative Care will return in person on 2/19/2024.    Cancer related pain  Assessment & Plan  Cancer related pain  Ongoing left shoulder pain exacerbated by movement but improved.  -Left thigh and hip pain improved with placement of Lidocaine patch at the left hip.   -offered options of addition of Long-acting opioid (Patient adamantly against Fentanyl in any form). Discussed ER Oxycodone vs Morphine but patient would like to hold off on these options at this time.   -Discussed offer to increase frequency of PO Dilaudid 8mg to q4 hours ATC, however, patient decided to leave current regimen as is for now.    Continue APAP 975 mg q8 hours ATC  Continue 8mg q6 hours scheduled with hold parameters  Further increase of Gabapentin today for neuropathic pain (left arm/elbow and left leg) as patient endorses fair improvement with the increase yesterday (2/15/2024)  Increase afternoon dose of Gabapentin to 200 mg  today  Gabapentin 100mg in the morning, 200 mg in the afternoon and at bedtime  If patient tolerates today with benefit, can increase tomorrow to Gabapentin 200 mg TID  Renal function reviewed - Cr 0.56, eGFR 110  Continue PO dilaudid 6 mg q4 hours PRN for moderate to severe pains  Continue IV Dilaudid 0.5 mg q4 hours PRN for BTP  Narcan on order for concerns for respiratory depression or need for opioid reversal         Social Support:  Supportive listening provided  Normalized experience of patient/family  Provided anxiety containment    Follow-up  We appreciate the opportunity to participate in this patient's care.   We will continue to follow. [facility] to follow up in person on 2/19/2024.  Please do not hesitate to contact our on-call provider through our clinic answering service at [phone] should there be an acute change or other symptom control concerns.    Code status: Level 1 - Full Code   Decisional apparatus:  Patient does have capacity to make medical decisions on my exam today. If such capacity is lost, patient's substitute decision maker would default to daughter per ACP documentation by [place] Act 169.   Advance Directive / Living Will / POLST:  Yes - ACP paperwork designating daughterSoniya     We appreciate the opportunity to participate in this patient's care. We will continue to follow. Please do not hesitate to contact our on-call provider through our clinic answering service at [phone] should you have acute symptom control concerns.    Review of Systems   Constitutional:  Positive for activity change (able to participate in physical therapy today, able to ambulate short distances and steps) and appetite change (improved). Negative for chills and fever.   HENT:  Negative for ear pain and sore throat.    Eyes:  Negative for pain and visual disturbance.   Respiratory:  Negative for cough and shortness of breath.    Cardiovascular:  Negative for chest pain and palpitations.    Gastrointestinal:  Negative for abdominal pain, constipation, diarrhea, nausea and vomiting.   Genitourinary:  Negative for dysuria and hematuria.   Musculoskeletal:  Positive for arthralgias (left shoulder - still with intermittent periods of increased pain with movement) and back pain (improving).   All other systems reviewed and are negative.      MEDICATIONS / ALLERGIES:  all current active meds have been reviewed, current meds:   Current Facility-Administered Medications   Medication Dose Route Frequency    acetaminophen (TYLENOL) tablet 975 mg  975 mg Oral Q8H SHANTAL    albuterol inhalation solution 2.5 mg  2.5 mg Nebulization Q6H PRN    alteplase (CATHFLO) injection 2 mg  2 mg Intracatheter Q1MIN PRN    cyanocobalamin (VITAMIN B-12) tablet 1,000 mcg  1,000 mcg Oral Daily    dexamethasone (DECADRON) tablet 4 mg  4 mg Oral Q12H SHANTAL    fluticasone (FLONASE) 50 mcg/act nasal spray 1 spray  1 spray Each Nare Daily    folic acid (FOLVITE) tablet 1 mg  1 mg Oral Daily    [START ON 2/17/2024] gabapentin (NEURONTIN) capsule 100 mg  100 mg Oral QAM    gabapentin (NEURONTIN) capsule 200 mg  200 mg Oral BID    heparin (porcine) 25,000 units in 0.45% NaCl 250 mL infusion (premix)  3-30 Units/kg/hr (Order-Specific) Intravenous Titrated    heparin (porcine) injection 2,400 Units  2,400 Units Intravenous Q6H PRN    heparin (porcine) injection 4,800 Units  4,800 Units Intravenous Q6H PRN    HYDROmorphone (DILAUDID) injection 0.5 mg  0.5 mg Intravenous Q4H PRN    HYDROmorphone (DILAUDID) tablet 6 mg  6 mg Oral Q4H PRN    HYDROmorphone (DILAUDID) tablet 8 mg  8 mg Oral Q6H SHANTAL    ipratropium (ATROVENT) 0.02 % inhalation solution 0.5 mg  0.5 mg Nebulization TID    levalbuterol (XOPENEX) inhalation solution 1.25 mg  1.25 mg Nebulization TID    lidocaine (LIDODERM) 5 % patch 2 patch  2 patch Topical Daily    lidocaine-epinephrine (XYLOCAINE/EPINEPHRINE) 1 %-1:100,000 injection 20 mL  20 mL Infiltration Once    menthol-methyl  "salicylate (BENGAY) 10-15 % cream   Apply externally TID    naloxone (NARCAN) 0.04 mg/mL syringe 0.04 mg  0.04 mg Intravenous Q1MIN PRN    nicotine (NICODERM CQ) 14 mg/24hr TD 24 hr patch 14 mg  14 mg Transdermal Daily    polyethylene glycol (MIRALAX) packet 17 g  17 g Oral Q12H    senna-docusate sodium (SENOKOT S) 8.6-50 mg per tablet 2 tablet  2 tablet Oral BID    sodium chloride (OCEAN) 0.65 % nasal spray 1 spray  1 spray Each Nare Q1H PRN    sodium chloride 0.9 % infusion  20 mL/hr Intravenous Once   , and PTA meds:   Prior to Admission Medications   Prescriptions Last Dose Informant Patient Reported? Taking?   HYDROmorphone HCl ER 8 MG TB24 2/7/2024  No Yes   Sig: Take 1 tablet by mouth in the morning Max Daily Amount: 1 tablet   albuterol (Proventil HFA) 90 mcg/act inhaler Not Taking Self No No   Sig: Inhale 2 puffs every 6 (six) hours as needed for wheezing   Patient not taking: Reported on 1/3/2024   oxyCODONE-acetaminophen (Percocet) 5-325 mg per tablet 2/6/2024  No Yes   Sig: Take 1 tablet by mouth every 6 (six) hours as needed for moderate pain Max Daily Amount: 4 tablets      Facility-Administered Medications: None       No Known Allergies    OBJECTIVE:  /82   Pulse 100   Temp (!) 97.2 °F (36.2 °C)   Resp 18   Ht 5' 9\" (1.753 m)   Wt 56.4 kg (124 lb 5.4 oz)   SpO2 93%   BMI 18.36 kg/m²   Nursing notes reviewed.  Physical Exam  Vitals and nursing note reviewed.   Constitutional:       General: He is not in acute distress.     Appearance: He is well-developed. He is ill-appearing (chronically). He is not toxic-appearing or diaphoretic.      Interventions: He is not intubated.  HENT:      Head: Normocephalic and atraumatic.   Eyes:      Conjunctiva/sclera: Conjunctivae normal.   Cardiovascular:      Rate and Rhythm: Normal rate.   Pulmonary:      Effort: Pulmonary effort is normal. No tachypnea, bradypnea, accessory muscle usage or respiratory distress. He is not intubated.      Breath sounds: " Normal breath sounds.      Comments: On supplemental O2 via NC.  Abdominal:      Palpations: Abdomen is soft.      Tenderness: There is no abdominal tenderness.   Musculoskeletal:         General: No swelling.      Cervical back: Neck supple.      Right lower leg: No tenderness. No edema.      Left lower leg: No tenderness. No edema.   Skin:     General: Skin is warm and dry.      Capillary Refill: Capillary refill takes less than 2 seconds.      Coloration: Skin is not cyanotic or pale.   Neurological:      General: No focal deficit present.      Mental Status: He is alert and oriented to person, place, and time.   Psychiatric:         Mood and Affect: Mood normal. Mood is not anxious.         Behavior: Behavior normal. Behavior is not agitated.         Lab Results: I have personally reviewed pertinent labs.    HEMATOLOGY PROFILE:  Results from last 7 days   Lab Units 02/16/24  0430 02/14/24 0437 02/13/24  0354   WBC Thousand/uL 8.22 8.77 7.86   HEMOGLOBIN g/dL 8.6* 9.8* 9.8*   HEMATOCRIT % 26.0* 29.8* 29.7*   PLATELETS Thousands/uL 263 243 220   NEUTROS PCT %  --   --  65   MONOS PCT %  --   --  10   MONO PCT % 10 4  --    EOS PCT % 0 2 2       CHEMISTRY PROFILE:  Results from last 7 days   Lab Units 02/16/24  0430 02/14/24  0437 02/13/24  0354   SODIUM mmol/L 134* 134* 131*   POTASSIUM mmol/L 4.4 4.0 4.3   CHLORIDE mmol/L 94* 93* 91*   CO2 mmol/L 30 31 30   BUN mg/dL 24 22 26*   CREATININE mg/dL 0.52* 0.56* 0.64   CALCIUM mg/dL 9.5 9.6 9.6       Albumin:  0   Lab Value Date/Time    ALB 3.8 02/07/2024 1802     Imaging Studies: I have personally reviewed pertinent reports.  EKG, Pathology, and Other Studies: I have personally reviewed pertinent reports.    Counseling / Coordination of Care:  Total floor / unit time spent today 25 minutes. Greater than 50% of total time was spent with the patient and / or family counseling and / or coordination of care. A description of the counseling / coordination of care:  "symptom assessment and management, medication review, medication adjustment, psychosocial support, chart review, imaging review, lab review, supportive listening, and anticipatory guidance.    Jb Maynard DO  [facility] Palliative and Supportive Care  [phone]    Portions of this document may have been created using dictation software and as such some \"sound alike\" terms may have been generated by the system. Do not hesitate to contact me with any questions or clarifications.      "

## 2024-02-16 NOTE — PLAN OF CARE
Problem: PHYSICAL THERAPY ADULT  Goal: Performs mobility at highest level of function for planned discharge setting.  See evaluation for individualized goals.  Description: Treatment/Interventions: ADL retraining, Functional transfer training, LE strengthening/ROM, Elevations, Therapeutic exercise, Endurance training, Patient/family training, Equipment eval/education, Bed mobility, Gait training, Compensatory technique education, Spoke to case management, OT  Equipment Recommended: Walker       See flowsheet documentation for full assessment, interventions and recommendations.  Outcome: Progressing  Note:    Problem List: Decreased strength, Decreased endurance, Impaired balance, Decreased mobility, Decreased safety awareness, Pain  Assessment: pt began tx session lying supine in [facility] bed and was agreeable to participate in PT intervention. PT intervention to focus on bed mobility, transfer training, TE activities, posture/balance w/ gait and stair trials. Since last PT intervention progress was noted as pt was able to complete bed mobility w/ mod I, no VC's required. pt continues to remain consistant with /s for all functional transfers to and from RW w/o LOB. pt continues to be limited with activity tolerance and ambulation distance due to increased L arm ( shoulder) pain 10/10 RN made aware. pt was able to increase ambulation distance to 50'x1 RW. pt again educatyed w/ verbal/visual demonstration on all stair trials strategies prior to initiating steps. pt completed 6 steps with bilateral hand rails and min ax1 for increased safety and balance. pt was able to participate in TE activities >10 minutes in order to strengthen Le's in efforts in reducing risk for falls and injuries. pt would benefit from continued skilled PT intervention in order to address defiicts listed above. Post tx pt in recliner with call bell, RN made aware and all pt needs met        Rehab Resource Intensity Level, PT: III (Minimum Resource  Intensity)    See flowsheet documentation for full assessment.

## 2024-02-17 PROBLEM — J91.0 MALIGNANT PLEURAL EFFUSION: Status: ACTIVE | Noted: 2024-01-01

## 2024-02-17 NOTE — ASSESSMENT & PLAN NOTE
Last bowel movement 5 days ago.  Consider Relistor tomorrow if still not having bowel movement  Continue bowel regimen for today

## 2024-02-17 NOTE — ASSESSMENT & PLAN NOTE
Acute nonocclusive DVT in right subclavian vein and left IJ in setting of multiple provoking factors.    Continue heparin drip  Eliquis sent to pharmacy, price needed to be confirmed with case management  Consider transitioning to Eliquis 10 mg twice daily tomorrow for 2 more days to complete total of 7-day aggressive anticoagulation followed by 5 mg twice daily for at least 3 months of therapy  Follow-up pulmonology and oncology outpatient

## 2024-02-17 NOTE — PROGRESS NOTES
[facility]  Progress Note  Name: Abdulaziz Gomez I  MRN: [id number]  Unit/Bed#: S -01 I Date of Admission: 2/7/2024   Date of Service: 2/17/2024 I Hospital Day: 10    Assessment/Plan   Constipation  Assessment & Plan  Last bowel movement 5 days ago.  Consider Relistor tomorrow if still not having bowel movement  Continue bowel regimen for today    Acute deep vein thrombosis (DVT) of both upper extremities (HCC)  Assessment & Plan  Acute nonocclusive DVT in right subclavian vein and left IJ in setting of multiple provoking factors.    Continue heparin drip  Eliquis sent to pharmacy, price needed to be confirmed with case management  Consider transitioning to Eliquis 10 mg twice daily tomorrow for 2 more days to complete total of 7-day aggressive anticoagulation followed by 5 mg twice daily for at least 3 months of therapy  Follow-up pulmonology and oncology outpatient    Left shoulder pain  Assessment & Plan  Left shoulder pain, pain radiating to little finger  Cervical spine reveals features of cervical spine stenosis metastatic disease possibly contributing to symptoms  Continue gabapentin  PT/OT    Severe protein-calorie malnutrition (HCC)  Assessment & Plan       Body mass index is 18.36 kg/m².   Continue to encourage caloric intake    COPD (chronic obstructive pulmonary disease) (HCC)  Assessment & Plan  Not in acute exacerbation  Continue to monitor    Hyponatremia  Assessment & Plan   has since improved and stabilized  Continue to monitor    Malignant pleural effusion  Assessment & Plan  Malignant right pleural effusion likely in setting of underlying metastatic lung cancer s/p TPC placement on 2/13/2024.  Plan for drainage every other day  Will require home drainage bottles upon discharge  Pulmonary following will need to notify 48 hours prior to discharge for arranging home drainage bottles  Removal of sutures in 7 to 10 days from placement    Cancer related pain  Assessment  "& Plan  Imaging studies show extensive metastatic disease including bones  Palliative care on board  Continue gabapentin  Currently is on scheduled oral Dilaudid 8 mg every 6 hours  Continue p.o. Dilaudid 6 mg every 4 hours as needed and IV Dilaudid 0.5 mg every 4 hours as needed  Bowel regimen in place   follow-up further recommendations regarding pain control    Metastatic non-small cell lung cancer (HCC)  Assessment & Plan  patient recently diagnosed with 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  CTA PE study : \"Redemonstration of tumor encasing the distal trachea and right bronchi extending into the anterior mediastinum and lower neck bilaterally encasing and narrowing of the brachiocephalic veins and SVC and occlusion of the right bronchi. Mild bilateral axillary lymphadenopathy, possibly metastatic. Liver metastases better shown on prior study.\"  Radiation oncology on board started on inpatient radiation with plan of total 10 sessions, can be continued outpatient as well  Oncology following plan to start chemotherapy  2/16/2024  Patient would like inpatient radiation dose on Monday prior to discharge     Current every day smoker  Assessment & Plan  Smoking cessation counseling provided    * Acute respiratory failure with hypoxia (HCC)  Assessment & Plan  Likely multifactorial in setting of pleural effusion, lung cancer with possible lymphangitic spread, emphysema.  Currently on 6 L.  Has significantly improved since presentation.  2/17: patient was laid flat for suppository and had acute desaturation and had to be placed on bipap. Chest Xray obtain and showed pulmonary vascular congestion, was given one dose of IV lasix 40 mg.     Home O2 evaluation on discharge               VTE Pharmacologic Prophylaxis: VTE Score: 6 High Risk (Score >/= 5) - Pharmacological DVT Prophylaxis Ordered: heparin drip. Sequential Compression Devices Ordered.    Mobility:   Basic Mobility Inpatient Raw " Score: 18  -HLM Goal: 6: Walk 10 steps or more  -HLM Achieved: 1: Laying in bed  HLM Goal NOT achieved. Continue with multidisciplinary rounding and encourage appropriate mobility to improve upon HLM goals.    Patient Centered Rounds: I performed bedside rounds with nursing staff today.   Discussions with Specialists or Other Care Team Provider: Pulmonology     Education and Discussions with Family / Patient: Updated  (wife) at bedside.    Total Time Spent on Date of Encounter in care of patient: 45 mins. This time was spent on one or more of the following: performing physical exam; counseling and coordination of care; obtaining or reviewing history; documenting in the medical record; reviewing/ordering tests, medications or procedures; communicating with other healthcare professionals and discussing with patient's family/caregivers.    Current Length of Stay: 10 day(s)  Current Patient Status: Inpatient   Certification Statement: The patient will continue to require additional inpatient hospital stay due to Monitoring for inpatient chemotherapy and radiation and oxygen requirements   Discharge Plan: Anticipate discharge in 48 hrs to home.    Code Status: Level 1 - Full Code    Subjective:   Patient seen and examined. Patient has no complaints. Arm pain is improving.     Objective:     Vitals:   Temp (24hrs), Av.2 °F (36.2 °C), Min:96.8 °F (36 °C), Max:97.5 °F (36.4 °C)    Temp:  [96.8 °F (36 °C)-97.5 °F (36.4 °C)] 97.2 °F (36.2 °C)  HR:  [] 96  Resp:  [16-18] 16  BP: (129-156)/(65-82) 144/70  SpO2:  [70 %-97 %] 95 %  Body mass index is 18.56 kg/m².     Input and Output Summary (last 24 hours):     Intake/Output Summary (Last 24 hours) at 2024 1509  Last data filed at 2024 1501  Gross per 24 hour   Intake 200 ml   Output 1300 ml   Net -1100 ml       Physical Exam:   Physical Exam  Vitals and nursing note reviewed.   Constitutional:       General: He is not in acute distress.      Appearance: He is well-developed.   HENT:      Head: Normocephalic and atraumatic.   Eyes:      Conjunctiva/sclera: Conjunctivae normal.   Cardiovascular:      Rate and Rhythm: Normal rate and regular rhythm.      Heart sounds: No murmur heard.  Pulmonary:      Effort: Pulmonary effort is normal. No respiratory distress.      Breath sounds: Rhonchi present.      Comments: Decreased in right lung base   Abdominal:      General: Bowel sounds are normal. There is no distension.      Palpations: Abdomen is soft.      Tenderness: There is no abdominal tenderness. There is no guarding or rebound.   Musculoskeletal:      Cervical back: Neck supple.      Right lower leg: No edema.      Left lower leg: No edema.   Skin:     General: Skin is warm and dry.      Capillary Refill: Capillary refill takes less than 2 seconds.   Neurological:      Mental Status: He is alert.   Psychiatric:         Mood and Affect: Mood normal.          Additional Data:     Labs:  Results from last 7 days   Lab Units 02/17/24  0425 02/16/24  0430 02/14/24  0437   WBC Thousand/uL 7.59   < > 8.77   HEMOGLOBIN g/dL 8.0*   < > 9.8*   HEMATOCRIT % 25.0*   < > 29.8*   PLATELETS Thousands/uL 267   < > 243   BANDS PCT %  --   --  3   NEUTROS PCT % 89*  --   --    LYMPHS PCT % 5*  --   --    LYMPHO PCT   --    < > 18   MONOS PCT % 4  --   --    MONO PCT   --    < > 4   EOS PCT % 0   < > 2    < > = values in this interval not displayed.     Results from last 7 days   Lab Units 02/17/24  0425   SODIUM mmol/L 134*   POTASSIUM mmol/L 4.4   CHLORIDE mmol/L 94*   CO2 mmol/L 31   BUN mg/dL 28*   CREATININE mg/dL 0.56*   ANION GAP mmol/L 9   CALCIUM mg/dL 9.1   GLUCOSE RANDOM mg/dL 134     Results from last 7 days   Lab Units 02/12/24  1909   INR  1.13                   Lines/Drains:  Invasive Devices       Peripheral Intravenous Line  Duration             Peripheral IV 02/14/24 Left;Ventral (anterior) Forearm 2 days    Peripheral IV 02/17/24 Right;Ventral  (anterior) Forearm <1 day              Drain  Duration             Pleural Effusion Long-Term Catheter 16 Fr. 4 days                          Imaging: No pertinent imaging reviewed.    Recent Cultures (last 7 days):         Last 24 Hours Medication List:   Current Facility-Administered Medications   Medication Dose Route Frequency Provider Last Rate    acetaminophen  975 mg Oral Q8H SHANTAL Jb Shiu, DO      albuterol  2.5 mg Nebulization Q6H PRN Afsaneh Knapp MD      alteplase  2 mg Intracatheter Q1MIN PRN Margo Kearns MD      bisacodyl  10 mg Rectal Daily PRN Susan Simon, DO      cyanocobalamin  1,000 mcg Oral Daily Margo Kearns MD      dexamethasone  4 mg Oral Q12H [facility] Margo Kearns MD      fluticasone  1 spray Each Nare Daily Alfredo Barrios,       folic acid  1 mg Oral Daily Margo Kearns MD      gabapentin  100 mg Oral QAM Jb Shiu, DO      gabapentin  200 mg Oral BID Jb Olverau, DO      heparin (porcine)  3-30 Units/kg/hr (Order-Specific) Intravenous Titrated Afsaneh Knapp MD 17 Units/kg/hr (02/16/24 2215)    heparin (porcine)  2,400 Units Intravenous Q6H PRN Afsaneh Knapp MD      heparin (porcine)  4,800 Units Intravenous Q6H PRN Afsaneh Knapp MD      HYDROmorphone  0.5 mg Intravenous Q4H PRN Jb Olverau, DO      HYDROmorphone  6 mg Oral Q4H PRN Jennifer Paige Bloch, CRNP      HYDROmorphone  8 mg Oral Q6H [facility] Jennifer Paige Bloch, CRNP      ipratropium  0.5 mg Nebulization TID Guera Hernández MD      levalbuterol  1.25 mg Nebulization TID Guera Hernández MD      lidocaine  2 patch Topical Daily Guera Hernández MD      lidocaine-epinephrine  20 mL Infiltration Once Alfredo Barrios,       menthol-methyl salicylate   Apply externally TID Guera Hernández MD      naloxone  0.04 mg Intravenous Q1MIN PRN Jb Maynard, DO      nicotine  14 mg Transdermal Daily Guera Hernández MD      polyethylene  glycol  17 g Oral Q12H Guera Hernández MD      senna-docusate sodium  2 tablet Oral BID Jb Maynard DO      sodium chloride  1 spray Each Nare Q1H PRN Alfredo Barrios DO      sodium chloride  20 mL/hr Intravenous Once Margo Kearns MD          Today, Patient Was Seen By: Susan Simon DO    **Please Note: This note may have been constructed using a voice recognition system.**

## 2024-02-17 NOTE — ASSESSMENT & PLAN NOTE
Imaging studies show extensive metastatic disease including bones  Palliative care on board  Continue gabapentin  Currently is on scheduled oral Dilaudid 8 mg every 6 hours  Continue p.o. Dilaudid 6 mg every 4 hours as needed and IV Dilaudid 0.5 mg every 4 hours as needed  Bowel regimen in place   follow-up further recommendations regarding pain control

## 2024-02-17 NOTE — ASSESSMENT & PLAN NOTE
Likely multifactorial in setting of pleural effusion, lung cancer with possible lymphangitic spread, emphysema.  Currently on 6 L.  Has significantly improved since presentation.  2/17: patient was laid flat for suppository and had acute desaturation and had to be placed on bipap. Chest Xray obtain and showed pulmonary vascular congestion, was given one dose of IV lasix 40 mg.     Home O2 evaluation on discharge

## 2024-02-17 NOTE — PLAN OF CARE
Problem: Potential for Falls  Goal: Patient will remain free of falls  Description: INTERVENTIONS:  - Educate patient/family on patient safety including physical limitations  - Instruct patient to call for assistance with activity   - Consult OT/PT to assist with strengthening/mobility   - Keep Call bell within reach  - Keep bed low and locked with side rails adjusted as appropriate  - Keep care items and personal belongings within reach  - Initiate and maintain comfort rounds  - Make Fall Risk Sign visible to staff  - Apply yellow socks and bracelet for high fall risk patients  - Consider moving patient to room near nurses station  Outcome: Progressing     Problem: PAIN - ADULT  Goal: Verbalizes/displays adequate comfort level or baseline comfort level  Description: Interventions:  - Encourage patient to monitor pain and request assistance  - Assess pain using appropriate pain scale  - Administer analgesics based on type and severity of pain and evaluate response  - Implement non-pharmacological measures as appropriate and evaluate response  - Consider cultural and social influences on pain and pain management  - Notify physician/advanced practitioner if interventions unsuccessful or patient reports new pain  Outcome: Progressing     Problem: INFECTION - ADULT  Goal: Absence or prevention of progression during hospitalization  Description: INTERVENTIONS:  - Assess and monitor for signs and symptoms of infection  - Monitor lab/diagnostic results  - Monitor all insertion sites, i.e. indwelling lines, tubes, and drains  - Monitor endotracheal if appropriate and nasal secretions for changes in amount and color  - [place] appropriate cooling/warming therapies per order  - Administer medications as ordered  - Instruct and encourage patient and family to use good hand hygiene technique  - Identify and instruct in appropriate isolation precautions for identified infection/condition  Outcome: Progressing  Goal: Absence of  fever/infection during neutropenic period  Description: INTERVENTIONS:  - Monitor WBC    Outcome: Progressing     Problem: SAFETY ADULT  Goal: Patient will remain free of falls  Description: INTERVENTIONS:  - Educate patient/family on patient safety including physical limitations  - Instruct patient to call for assistance with activity   - Consult OT/PT to assist with strengthening/mobility   - Keep Call bell within reach  - Keep bed low and locked with side rails adjusted as appropriate  - Keep care items and personal belongings within reach  - Initiate and maintain comfort rounds  - Make Fall Risk Sign visible to staff  - Apply yellow socks and bracelet for high fall risk patients  - Consider moving patient to room near nurses station  Outcome: Progressing  Goal: Maintain or return to baseline ADL function  Description: INTERVENTIONS:  -  Assess patient's ability to carry out ADLs; assess patient's baseline for ADL function and identify physical deficits which impact ability to perform ADLs (bathing, care of mouth/teeth, toileting, grooming, dressing, etc.)  - Assess/evaluate cause of self-care deficits   - Assess range of motion  - Assess patient's mobility; develop plan if impaired  - Assess patient's need for assistive devices and provide as appropriate  - Encourage maximum independence but intervene and supervise when necessary  - Involve family in performance of ADLs  - Assess for home care needs following discharge   - Consider OT consult to assist with ADL evaluation and planning for discharge  - Provide patient education as appropriate  Outcome: Progressing  Goal: Maintains/Returns to pre admission functional level  Description: INTERVENTIONS:  - Perform AM-PAC 6 Click Basic Mobility/ Daily Activity assessment daily.  - Set and communicate daily mobility goal to care team and patient/family/caregiver.   - Collaborate with rehabilitation services on mobility goals if consulted  - Out of bed for toileting  -  Record patient progress and toleration of activity level   Outcome: Progressing     Problem: DISCHARGE PLANNING  Goal: Discharge to home or other facility with appropriate resources  Description: INTERVENTIONS:  - Identify barriers to discharge w/patient and caregiver  - Arrange for needed discharge resources and transportation as appropriate  - Identify discharge learning needs (meds, wound care, etc.)  - Arrange for interpretive services to assist at discharge as needed  - Refer to Case Management Department for coordinating discharge planning if the patient needs post-hospital services based on physician/advanced practitioner order or complex needs related to functional status, cognitive ability, or social support system  Outcome: Progressing     Problem: Knowledge Deficit  Goal: Patient/family/caregiver demonstrates understanding of disease process, treatment plan, medications, and discharge instructions  Description: Complete learning assessment and assess knowledge base.  Interventions:  - Provide teaching at level of understanding  - Provide teaching via preferred learning methods  Outcome: Progressing     Problem: RESPIRATORY - ADULT  Goal: Achieves optimal ventilation and oxygenation  Description: INTERVENTIONS:  - Assess for changes in respiratory status  - Assess for changes in mentation and behavior  - Position to facilitate oxygenation and minimize respiratory effort  - Oxygen administered by appropriate delivery if ordered  - Initiate smoking cessation education as indicated  - Encourage broncho-pulmonary hygiene including cough, deep breathe, Incentive Spirometry  - Assess the need for suctioning and aspirate as needed  - Assess and instruct to report SOB or any respiratory difficulty  - Respiratory Therapy support as indicated  Outcome: Progressing     Problem: Nutrition/Hydration-ADULT  Goal: Nutrient/Hydration intake appropriate for improving, restoring or maintaining nutritional  needs  Description: Monitor and assess patient's nutrition/hydration status for malnutrition. Collaborate with interdisciplinary team and initiate plan and interventions as ordered.  Monitor patient's weight and dietary intake as ordered or per policy. Utilize nutrition screening tool and intervene as necessary. Determine patient's food preferences and provide high-protein, high-caloric foods as appropriate.     INTERVENTIONS:  - Monitor oral intake, urinary output, labs, and treatment plans  - Assess nutrition and hydration status and recommend course of action  - Evaluate amount of meals eaten  - Assist patient with eating if necessary   - Allow adequate time for meals  - Recommend/ encourage appropriate diets, oral nutritional supplements, and vitamin/mineral supplements  - Order, calculate, and assess calorie counts as needed  - Recommend, monitor, and adjust tube feedings and TPN/PPN based on assessed needs  - Assess need for intravenous fluids  - Provide specific nutrition/hydration education as appropriate  - Include patient/family/caregiver in decisions related to nutrition  Outcome: Progressing     Problem: Prexisting or High Potential for Compromised Skin Integrity  Goal: Skin integrity is maintained or improved  Description: INTERVENTIONS:  - Identify patients at risk for skin breakdown  - Assess and monitor skin integrity  - Assess and monitor nutrition and hydration status  - Monitor labs   - Assess for incontinence   - Turn and reposition patient  - Assist with mobility/ambulation  - Relieve pressure over bony prominences  - Avoid friction and shearing  - Provide appropriate hygiene as needed including keeping skin clean and dry  - Evaluate need for skin moisturizer/barrier cream  - Collaborate with interdisciplinary team   - Patient/family teaching  - Consider wound care consult   Outcome: Progressing

## 2024-02-17 NOTE — RESPIRATORY THERAPY NOTE
02/10/24 0742   Respiratory Protocol   Protocol Initiated? Yes   Protocol Selection Respiratory   Language Barrier? No   Medical & Social History Reviewed? Yes   Diagnostic Studies Reviewed? Yes   Physical Assessment Performed? Yes   Respiratory Plan No distress/Pulmonary history   Respiratory Assessment   Assessment Type During-treatment   General Appearance Alert;Awake   Respiratory Pattern Normal   Chest Assessment Chest expansion symmetrical   Bilateral Breath Sounds Diminished   Cough None   Resp Comments pt has hx lung CA   O2 Device NC 6LPM   Subjective Data pt in no distress   Additional Assessments   SpO2 90 %       
Called to room for pt desaturating. Upon arrival pt was on NRB with saturation in the 70s. RN stated that pt desaturated into the 60s and she placed him on NRB. Pt had coarse breath sounds and was unable to cough up secretions. RT NT suctioned for moderate amount of thick, white secretions. Pt sat was still in the 80s and pt breathing was labored with accessory muscle usage. RN notified provider. RT placed pt on bipap via verbal order from provider on documented settings. Pt sat in 90s on bipap and pt has decreased WOB.   
RT Protocol Note  Abdulaziz Gomez 63 y.o. 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 rate 20, SpO2 95%.          Imaging and other studies: I have personally reviewed pertinent reports.      O2 Device: 5L NC     Plan    Respiratory Plan: Discontinue Protocol        Resp Comments: Pt states he has a recent hx of lung carcinoma restricting his breathing. Does not use home bronchodilator due to reaction of headache. No home O2.   
all other ROS negative except as per HPI

## 2024-02-17 NOTE — ASSESSMENT & PLAN NOTE
Malignant right pleural effusion likely in setting of underlying metastatic lung cancer s/p TPC placement on 2/13/2024.  Plan for drainage every other day  Will require home drainage bottles upon discharge  Pulmonary following will need to notify 48 hours prior to discharge for arranging home drainage bottles  Removal of sutures in 7 to 10 days from placement

## 2024-02-17 NOTE — ASSESSMENT & PLAN NOTE
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  CTA PE study : \"Redemonstration of tumor encasing the distal trachea and right bronchi extending into the anterior mediastinum and lower neck bilaterally encasing and narrowing of the brachiocephalic veins and SVC and occlusion of the right bronchi. Mild bilateral axillary lymphadenopathy, possibly metastatic. Liver metastases better shown on prior study.\"  Radiation oncology on board started on inpatient radiation with plan of total 10 sessions, can be continued outpatient as well  Oncology following plan to start chemotherapy  2/16/2024  Patient would like inpatient radiation dose on Monday prior to discharge   "

## 2024-02-18 NOTE — ASSESSMENT & PLAN NOTE
Pt presented 2/8 w/ increased WOB, likely due to stage IV lung adenocarcinoma, malignant pleural effusion, and emphysema. Currently saturating 90 on BiPAP. S/p pleurex catheter placement 2/13. Most recent drainage preformed 2/18.     Plan  Repeat CXR  Lasix 40 mg x1, diurese further as needed  Continue BiPAP, wean as tolerated

## 2024-02-18 NOTE — CONSULTS
[facility]  Consult  Name: Abdulaziz Gomez 63 y.o. male I MRN: [id number]  Unit/Bed#: S -01 I Date of Admission: 2/7/2024   Date of Service: 2/18/2024 I Hospital Day: 11    Assessment/Plan   * Acute respiratory failure with hypoxia (HCC)  Assessment & Plan  Pt presented 2/8 w/ increased WOB, likely due to stage IV lung adenocarcinoma, malignant pleural effusion, and emphysema. Currently saturating 90 on BiPAP. S/p pleurex catheter placement 2/13. Most recent drainage preformed 2/18.     Plan  Repeat CXR  Lasix 40 mg x1, diurese further as needed  Continue BiPAP, wean as tolerated    Metastatic non-small cell lung cancer (HCC)  Assessment & Plan  Secondary to smoking history. Currently receiving palliative radiation as well as chemo while in hospital. Further plan per radiation oncology and medical oncology.     Malignant pleural effusion  Assessment & Plan  S/p pleurex catheter placement and drainages. Continue to drain as indicated.     Cancer related pain  Assessment & Plan  Extensive metastases noted on imaging, including bony mets.     Plan  Palliative care following, further recs appreciated  Tylenol 975 q8  Continue gabapentin 100 q.a.m., 200 BID  Continue dilaudid 8 mg PO q8  Dilaudid 6 mg PO q6 prn for moderate and severe pain  Dilaudid 0.5 mg IV q4 prn for breakthrough pain    Constipation  Assessment & Plan  Continue Miralax and Senna daily, Doculax suppository prn    Acute deep vein thrombosis (DVT) of both upper extremities (HCC)  Assessment & Plan  Acute DVT's noted R subclavian and L IJ and subclavian on 2/12.     Plan  Continue DVT heparin drip, currently day 7  Consider transition to eliquis for further anticoagulation     Current every day smoker  Assessment & Plan  Continue 14 mg nicotine replacement patch           History of Present Illness     HPI: Abdulaziz Gomez is a 63 y.o. with a history of smoking and stage IV lung adenocarcinoma with bony mets who  presents with acute respiratory failure with hypoxia. Pt was initially admitted 2/8 after being sent by outpatient palliative care for increased work of breathing and worsening cancer related pain. He was found to have a R sided pleural effusion and pleurex catheter was placed 2/13. He has had the effusion drained several times, including today 2/18, with some relief in symptoms. His oxygen requirement began to increase 2/17-18, eventually requiring MFNC and then BiPAP to maintain oxygen saturations. At that time the decision was made to upgrade his status and move him to the ICU.     He is being seen by palliative care while admitted, as well as receiving chemo and palliative radiation treatments.     History obtained from wife, chart review, and the patient.  Review of Systems   Constitutional:  Positive for fatigue. Negative for chills and fever.   Eyes:  Negative for visual disturbance.   Respiratory:  Positive for chest tightness and shortness of breath.    Cardiovascular:  Negative for chest pain and leg swelling.   Gastrointestinal:  Negative for abdominal pain, nausea and vomiting.   Musculoskeletal:  Positive for arthralgias.        L arm, hip pain   Neurological:  Positive for weakness (generalized).   Psychiatric/Behavioral:  Negative for agitation and confusion.      Disposition: Stepdown Level 1   Historical Information   Past Medical History:  No date: Metastatic non-small cell lung cancer (HCC) Past Surgical History:  1/23/2024: IR BIOPSY BONE  No date: TONSILLECTOMY; Bilateral   Current Outpatient Medications   Medication Instructions    albuterol (Proventil HFA) 90 mcg/act inhaler 2 puffs, Inhalation, Every 6 hours PRN    apixaban (ELIQUIS) 5 mg, Oral, 2 times daily    HYDROmorphone HCl ER 8 MG TB24 1 tablet, Oral, Daily    oxyCODONE-acetaminophen (Percocet) 5-325 mg per tablet 1 tablet, Oral, Every 6 hours PRN    No Known Allergies   Social History     Tobacco Use    Smoking status: Every Day      Current packs/day: 1.00     Average packs/day: 1 pack/day for 50.1 years (50.1 ttl pk-yrs)     Types: Cigarettes     Start date: 1974     Passive exposure: Past    Smokeless tobacco: Never   Vaping Use    Vaping status: Never Used   Substance Use Topics    Alcohol use: No    Drug use: No    Family History   Problem Relation Age of Onset    Diabetes Mother     Heart disease Brother         Objective                            Vitals I/O      Most Recent Min/Max in 24hrs   Temp 97.6 °F (36.4 °C) Temp  Min: 97.2 °F (36.2 °C)  Max: 97.6 °F (36.4 °C)   Pulse (!) 106 Pulse  Min: 94  Max: 106   Resp 17 Resp  Min: 16  Max: 17   /78 BP  Min: 136/78  Max: 162/81   O2 Sat 91 % SpO2  Min: 87 %  Max: 93 %      Intake/Output Summary (Last 24 hours) at 2/18/2024 1523  Last data filed at 2/18/2024 1227  Gross per 24 hour   Intake 480 ml   Output 700 ml   Net -220 ml       Diet Regular; Regular House    Invasive Monitoring           Physical Exam   Physical Exam  Eyes:      Extraocular Movements: Extraocular movements intact.      Conjunctiva/sclera: Conjunctivae normal.   Skin:     General: Skin is warm and dry.   HENT:      Head: Normocephalic and atraumatic.   Cardiovascular:      Rate and Rhythm: Normal rate and regular rhythm.   Abdominal:      Palpations: Abdomen is soft.      Tenderness: There is no abdominal tenderness. There is no guarding.   Constitutional:       General: He is in acute distress.      Appearance: He is ill-appearing.   Pulmonary:      Breath sounds: Wheezing (faint, L) and rhonchi (mild, diffuse) present.      Comments: BiPAP  Neurological:      General: No focal deficit present.      Mental Status: He is oriented to person, place and time. He is not agitated.            Diagnostic Studies      EKG: sinus tachycardia 2/7  Imaging:   XR chest portable   Final Result by Rose Grijalva MD (02/17 2057)      No acute disease with mild right base atelectasis/scar.      Right paratracheal opacity due  to tumor.            Workstation performed: [id number]         XR chest portable   Final Result by Rose Grijalva MD (02/13 1149)      Insertion of right pleural drainage catheter with tip over the right lung base with drainage of right effusion.      Trace right apical pneumothorax.      Moderate right base atelectasis.            Workstation performed: [id number]         VAS upper limb venous duplex scan, complete, bilateral   Final Result by Daron Campuzano DO (02/12 1350)      XR shoulder 2+ vw left   Final Result by Mehdi Morocho MD (02/12 0837)      No acute osseous abnormality.         Workstation performed: [id number]         XR chest portable   Final Result by Dl Lutz MD (02/11 1428)      1.  Stable right basilar opacity likely representing a combination of atelectasis and pleural effusion.      2.  Mild colonic distention with right-sided stool.            Workstation performed: [id number]         MRI lumbar spine w wo contrast   Final Result by E. Alec Schoenberger, MD (02/10 0744)   Redemonstration of diffuse osseous metastatic disease. No compression of the conus or cauda equina.   Slight loss of height at L1 with mild epidural disease without significant canal stenosis.   Extraosseous disease arising from the left sacral metastasis effacing the left lateral recess S1 and encroaching on the left S1 foramen as well as foraminal extension into the foramen at L5-S1   Tiny enhancing intradural extramedullary nodule along the conus at T12. Metastatic disease not excluded but this could also represent incidental nerve sheath tumor. Recommend follow-up and/or consider correlation with CSF.      Study marked in epic for notification.         Workstation performed: [id number]         MRI thoracic spine w wo contrast   Final Result by E. Alec Schoenberger, MD (02/10 0743)      Redemonstration of diffuse osseous metastatic disease. Pathologic compression fractures C7-T2. Subtle L1 compression.  Posterior bowing of the cortex/mild epidural disease most pronounced at C7-T1 with mild canal stenosis. No cord compression.   2 mm enhancing intradural extramedullary nodule at T12. Metastasis not excluded but this could represent incidental nerve sheath tumor.      Study was marked in epic for notification.      Workstation performed: [id number]         MRI cervical spine w wo contrast   Final Result by E. Alec Schoenberger, MD (02/10 0740)      Redemonstration of diffuse osseous metastatic disease. Pathologic compression fractures with severe loss of height at C7 and mild to moderate compression at T1 and T2. Posterior bowing of the cortex/mild ventral epidural disease at C7-T1 causing mild    canal stenosis. No cord compression.   Degenerative spondylosis as above with mild canal stenosis and multilevel foraminal stenosis.   Superimposed extraosseous tumor into the left C6-7 foramen contributing to severe foraminal stenosis.      Study marked in epic for notification.      Workstation performed: [id number]         XR chest portable   Final Result by Doreen Xavier MD (02/08 1200)      Persistent moderate right pleural effusion. No pneumothorax.      Persistent airspace opacity at the right lung base.            Workstation performed: [id number]         CTA ED chest PE Study   Final Result by Rose Grijalva MD (02/07 1941)      No pulmonary embolus.      Persistent large right pleural effusion.      Worsening groundglass opacity in the right middle lobe, of uncertain etiology.      Redemonstration of tumor encasing the distal trachea and right bronchi extending into the anterior mediastinum and lower neck bilaterally encasing and narrowing of the brachiocephalic veins and SVC and occlusion of the right bronchi.      Mild bilateral axillary lymphadenopathy, possibly metastatic.      Liver metastases better shown on prior study.      Multiple bone metastases.            Workstation performed: [id number]          XR chest portable ICU    (Results Pending)      I have personally reviewed pertinent reports.       Medications:  Scheduled PRN   acetaminophen, 975 mg, Q8H SHANTAL  cyanocobalamin, 1,000 mcg, Daily  fluticasone, 1 spray, Daily  folic acid, 1 mg, Daily  gabapentin, 100 mg, QAM  gabapentin, 200 mg, BID  HYDROmorphone, 8 mg, Q6H SHANTAL  ipratropium, 0.5 mg, TID  levalbuterol, 1.25 mg, TID  lidocaine, 2 patch, Daily  lidocaine-epinephrine, 20 mL, Once  menthol-methyl salicylate, , TID  nicotine, 14 mg, Daily  polyethylene glycol, 17 g, Q12H  senna-docusate sodium, 2 tablet, BID  sodium chloride, 20 mL/hr, Once      albuterol, 2.5 mg, Q6H PRN  alteplase, 2 mg, Q1MIN PRN  bisacodyl, 10 mg, Daily PRN  heparin (porcine), 2,400 Units, Q6H PRN  heparin (porcine), 4,800 Units, Q6H PRN  HYDROmorphone, 0.5 mg, Q4H PRN  HYDROmorphone, 6 mg, Q4H PRN  naloxone, 0.04 mg, Q1MIN PRN  sodium chloride, 1 spray, Q1H PRN       Continuous    heparin (porcine), 3-30 Units/kg/hr (Order-Specific), Last Rate: 19 Units/kg/hr (02/18/24 0704)         Labs:    CBC    Recent Labs     02/17/24  0425 02/18/24  0512   WBC 7.59 8.25   HGB 8.0* 8.6*   HCT 25.0* 26.6*    273     BMP    Recent Labs     02/17/24  0425 02/18/24  0512   SODIUM 134* 134*   K 4.4 4.7   CL 94* 93*   CO2 31 31   AGAP 9 10   BUN 28* 27*   CREATININE 0.56* 0.54*   CALCIUM 9.1 9.4       Coags    Recent Labs     02/18/24  0512 02/18/24  1239   PTT 56* 39*        Additional Electrolytes  No recent results       Blood Gas    Recent Labs     02/18/24  1446   PHART 7.471*   LDB9ZNI 43.1   PO2ART 57.8*   [id number] 30.7*   BEART 6.4   SOURCE Radial, Left     Recent Labs     02/18/24  1446   SOURCE Radial, Left    LFTs  No recent results    Infectious  No recent results  Glucose  Recent Labs     02/17/24  0425 02/18/24  0512   GLUC 134 121           Philip Eden, DO

## 2024-02-18 NOTE — PROCEDURES
General procedure    Date/Time: 2/7/2024 5:15 PM    Performed by: Bia Lovett DO  Authorized by: Bia Lovett DO    Patient location:  Bedside  Pre-procedure details:     Skin preparation:  ChloraPrep  Anesthesia (see MAR for exact dosages):     Anesthesia method:  None  Procedure Detail:     Procedure note (site, laterality, method, findings):  Bedside indwelling pleural catheter drainage under sterile conditions and redressed.  150 cc of lizette fluid removed without incident

## 2024-02-18 NOTE — SEPSIS NOTE
"  Sepsis Note   Abdulaziz Gomez 63 y.o. male MRN: [id number]  Unit/Bed#: ICU 16 Encounter: [id number]       Initial Sepsis Screening       Row Name 02/18/24 1622                Is the patient's history suggestive of a new or worsening infection? Yes (Proceed)  -SS        Suspected source of infection pneumonia  -SS        Indicate SIRS criteria Tachycardia > 90 bpm;Tachypnea > 20 resp per min  -SS        Are two or more of the above signs & symptoms of infection both present and new to the patient? Yes (Proceed)  -SS        Assess for evidence of organ dysfunction: Are any of the below criteria present within 6 hours of suspected infection and SIRS criteria that are NOT considered to be chronic conditions? New need for invasive/non-invasive ventilation  -        Date of presentation of severe sepsis 02/18/24  -        Time of presentation of severe sepsis 1615  -SS        Sepsis Note: Click \"NEXT\" below (NOT \"close\") to generate sepsis note based on above information. YES (proceed by clicking \"NEXT\")  -                  User Key  (r) = Recorded By, (t) = Taken By, (c) = Cosigned By      Initials Name Provider Type    SS MARY Banuelos Nurse Practitioner                        Body mass index is 18.56 kg/m².  Wt Readings from Last 1 Encounters:   02/17/24 57 kg (125 lb 10.6 oz)        Ideal body weight: 70.7 kg (155 lb 13.8 oz)    BCx2 pending  LA pending   Requiring biPAP for oxygen   Normotensive.   No IVF at this time. Patient also being diuresed. Will monitor LA.     MARY Lorenz   "

## 2024-02-18 NOTE — ASSESSMENT & PLAN NOTE
Secondary to smoking history. Currently receiving palliative radiation as well as chemo while in hospital. Further plan per radiation oncology and medical oncology.

## 2024-02-18 NOTE — PROGRESS NOTES
"Progress Note - Pulmonary   Abdulaziz Gomez 63 y.o. male MRN: [id number]  Unit/Bed#: S -01 Encounter: [id number]    Assessment:  Acute hypoxic respiratory failure  Non-small cell lung cancer stage IV  Malignant right pleural effusion  Suspected COPD  Nicotine dependence  DVT in left IJ and right subclavian    Plan:  From pulmonary standpoint continue drainage every other day at home and assess response.  The patient that the amount of fluid may decrease over time and we can space out drainages we will follow as an outpatient  Patient has high oxygen requirements saturating high 80s despite 12 L of oxygen, continue out of bed to chair incentive spirometer    Chief Complaint:   I am having a tough time    Subjective:   Patient is having hard time breathing appears comfortable, denies any cough or productive mucus    Objective:     Vitals: Blood pressure 162/81, pulse 100, temperature 97.6 °F (36.4 °C), resp. rate 17, height 5' 9\" (1.753 m), weight 57 kg (125 lb 10.6 oz), SpO2 (!) 89%.,Body mass index is 18.56 kg/m².      Intake/Output Summary (Last 24 hours) at 2/18/2024 1350  Last data filed at 2/18/2024 1227  Gross per 24 hour   Intake 480 ml   Output 1100 ml   Net -620 ml       Invasive Devices       Peripheral Intravenous Line  Duration             Peripheral IV 02/14/24 Left;Ventral (anterior) Forearm 3 days    Peripheral IV 02/17/24 Right;Ventral (anterior) Forearm 1 day              Drain  Duration             Pleural Effusion Long-Term Catheter 16 Fr. 5 days                    Physical Exam: /81   Pulse 100   Temp 97.6 °F (36.4 °C)   Resp 17   Ht 5' 9\" (1.753 m)   Wt 57 kg (125 lb 10.6 oz)   SpO2 (!) 89%   BMI 18.56 kg/m²   General appearance: alert and oriented, in no acute distress  Neck: no adenopathy, no carotid bruit, no JVD, supple, symmetrical, trachea midline, and thyroid not enlarged, symmetric, no tenderness/mass/nodules  Lungs: diminished breath sounds  Chest wall: no " tenderness  Extremities: extremities normal, warm and well-perfused; no cyanosis, clubbing, or edema  Lymph nodes: Cervical, supraclavicular, and axillary nodes normal.  Neurologic: Grossly normal     Labs: CBC:   Lab Results   Component Value Date    WBC 8.25 02/18/2024    HGB 8.6 (L) 02/18/2024    HCT 26.6 (L) 02/18/2024    MCV 96 02/18/2024     02/18/2024    RBC 2.77 (L) 02/18/2024    MCH 31.0 02/18/2024    MCHC 32.3 02/18/2024    RDW 14.9 02/18/2024    MPV 9.3 02/18/2024    NRBC 0 02/18/2024   , CMP:   Lab Results   Component Value Date    SODIUM 134 (L) 02/18/2024    K 4.7 02/18/2024    CL 93 (L) 02/18/2024    CO2 31 02/18/2024    BUN 27 (H) 02/18/2024    CREATININE 0.54 (L) 02/18/2024    CALCIUM 9.4 02/18/2024    EGFR 111 02/18/2024     Imaging and other studies: I have personally reviewed pertinent films in PACS

## 2024-02-18 NOTE — ASSESSMENT & PLAN NOTE
Acute DVT's noted R subclavian and L IJ and subclavian on 2/12.     Plan  Continue DVT heparin drip, currently day 7  Consider transition to eliquis for further anticoagulation

## 2024-02-18 NOTE — PROGRESS NOTES
Progress Note - Abdulaziz Gomez 63 y.o. male MRN: [id number]    Unit/Bed#: S -01 Encounter: [id number]      Assessment:  Constipation  Assessment & Plan  Last bowel movement 5 days ago.  Consider Relistor tomorrow if still not having bowel movement  Continue bowel regimen for today     Acute deep vein thrombosis (DVT) of both upper extremities (HCC)  Assessment & Plan  Acute nonocclusive DVT in right subclavian vein and left IJ in setting of multiple provoking factors.     Continue heparin drip  Eliquis sent to pharmacy, price needed to be confirmed with case management  Consider transitioning to Eliquis 10 mg twice daily tomorrow for 2 more days to complete total of 7-day aggressive anticoagulation followed by 5 mg twice daily for at least 3 months of therapy  Follow-up pulmonology and oncology outpatient     Left shoulder pain  Assessment & Plan  Left shoulder pain, pain radiating to little finger  Cervical spine reveals features of cervical spine stenosis metastatic disease possibly contributing to symptoms  Continue gabapentin  PT/OT     Severe protein-calorie malnutrition (HCC)  Assessment & Plan        Body mass index is 18.36 kg/m².   Continue to encourage caloric intake     COPD (chronic obstructive pulmonary disease) (HCC)  Assessment & Plan  Not in acute exacerbation  Continue to monitor     Hyponatremia  Assessment & Plan   has since improved and stabilized  Continue to monitor     Malignant pleural effusion  Assessment & Plan  Malignant right pleural effusion likely in setting of underlying metastatic lung cancer s/p TPC placement on 2/13/2024.  Plan for drainage every other day  Will require home drainage bottles upon discharge  Pulmonary following will need to notify 48 hours prior to discharge for arranging home drainage bottles  Removal of sutures in 7 to 10 days from placement     Cancer related pain  Assessment & Plan  Imaging studies show extensive metastatic disease including  "bones  Palliative care on board  Continue gabapentin  Currently is on scheduled oral Dilaudid 8 mg every 6 hours  Continue p.o. Dilaudid 6 mg every 4 hours as needed and IV Dilaudid 0.5 mg every 4 hours as needed  Bowel regimen in place   follow-up further recommendations regarding pain control     Metastatic non-small cell lung cancer (HCC)  Assessment & Plan  patient recently diagnosed with 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  CTA PE study : \"Redemonstration of tumor encasing the distal trachea and right bronchi extending into the anterior mediastinum and lower neck bilaterally encasing and narrowing of the brachiocephalic veins and SVC and occlusion of the right bronchi. Mild bilateral axillary lymphadenopathy, possibly metastatic. Liver metastases better shown on prior study.\"  Radiation oncology on board started on inpatient radiation with plan of total 10 sessions, can be continued outpatient as well  Oncology following plan to start chemotherapy  2/16/2024  Patient would like inpatient radiation dose on Monday prior to discharge      Current every day smoker  Assessment & Plan  Smoking cessation counseling provided     * Acute respiratory failure with hypoxia (HCC)  Assessment & Plan  Likely multifactorial in setting of pleural effusion, lung cancer with possible lymphangitic spread, emphysema.  Currently on 6 L.  Has significantly improved since presentation.  2/17: patient was laid flat for suppository and had acute desaturation and had to be placed on bipap. Chest Xray obtain and showed pulmonary vascular congestion, was given one dose of IV lasix 40 mg.      Home O2 evaluation on discharge    Plan:  Worsening hypoxia again today requiring BIPAP. Repeat chest XRAY is worse today compared to yesterday, may be developing post obstructive PNA vs mucous plugging. ABG shows low paO2. He was transferred to Critical care for monitoring. Responded well yesterday to dose of IV lasix, " "given another dose of 40 mg IV lasix.     Subjective:   Patient having trouble breathing.     Objective:     Vitals: Blood pressure 136/78, pulse (!) 106, temperature 97.6 °F (36.4 °C), resp. rate 17, height 5' 9\" (1.753 m), weight 57 kg (125 lb 10.6 oz), SpO2 91%.,Body mass index is 18.56 kg/m².      Intake/Output Summary (Last 24 hours) at 2/18/2024 1525  Last data filed at 2/18/2024 1227  Gross per 24 hour   Intake 480 ml   Output 700 ml   Net -220 ml       Physical Exam:   Physical Exam  Vitals and nursing note reviewed.   Constitutional:       General: He is in acute distress.      Appearance: He is well-developed. He is ill-appearing. He is not diaphoretic.   HENT:      Head: Normocephalic and atraumatic.   Eyes:      Conjunctiva/sclera: Conjunctivae normal.   Cardiovascular:      Rate and Rhythm: Normal rate and regular rhythm.      Heart sounds: No murmur heard.     Comments: No JVD   Pulmonary:      Effort: Respiratory distress present.      Breath sounds: Rhonchi present.   Abdominal:      Palpations: Abdomen is soft.      Tenderness: There is no abdominal tenderness.   Musculoskeletal:      Cervical back: Neck supple.      Right lower leg: No edema.      Left lower leg: No edema.   Skin:     General: Skin is warm and dry.      Capillary Refill: Capillary refill takes less than 2 seconds.   Neurological:      Mental Status: He is alert.   Psychiatric:         Mood and Affect: Mood normal.           Invasive Devices       Peripheral Intravenous Line  Duration             Peripheral IV 02/14/24 Left;Ventral (anterior) Forearm 3 days    Peripheral IV 02/17/24 Right;Ventral (anterior) Forearm 1 day              Drain  Duration             Pleural Effusion Long-Term Catheter 16 Fr. 5 days                    Lab, Imaging and other studies: I have personally reviewed pertinent films in PACS  VTE Pharmacologic Prophylaxis: Heparin drip   VTE Mechanical Prophylaxis: sequential compression device   "

## 2024-02-19 NOTE — PROGRESS NOTES
The patient was seen and examined, his wife was engaged in the discussion as well as his daughter, he had deterioration of respiratory status over the weekend, currently on high flow O2    He has exacerbation of COPD    Initiated on steroids this morning as well as antianxiety for air hunger, we discussed palliative/hospice care however, the family would like to have 24 to 48-hour depends if he has improvement and decrease the need for high flow O2    Prognosis guarded    20 minutes

## 2024-02-19 NOTE — ASSESSMENT & PLAN NOTE
Acute nonocclusive DVT in right subclavian vein and left IJ      Plan  Continue DVT heparin infusion, currently day 8  Consider transition to eliquis for further anticoagulation

## 2024-02-19 NOTE — ASSESSMENT & PLAN NOTE
Secondary to metastatic disease  Continue aggressive pain control utilizing gabapentin, Dilaudid, topical Bengay, acetaminophen  Breakthrough regimen ordered  Palliative care consulted-sophie fernandez

## 2024-02-19 NOTE — ASSESSMENT & PLAN NOTE
Exam without evidence of wheezing  Ipratropium scheduled ordered  As needed albuterol ordered  Continue to monitor pulse ox and exam for wheezing

## 2024-02-19 NOTE — ASSESSMENT & PLAN NOTE
[facility] discussion held with Pulm/Critical Care Team (Dr. Lo).  -Patient and family (wife and daughter both bedside).  -Discussed patient's understanding and opportunity for medical update.  -Dr. Lo discussed concerns for pneumonia and patient's escalating oxygen needs as well as what the potential options would be moving forward.   -In light of the concerns for patient's respiratory decline, Code Status was reviewed regarding Cardiac and Pulmonary Resuscitation    -Patient and family agree that they would like to pursue Level 3 - DNR/DNI code status at this time.    -Patient would like to proceed with treatments for Pneumonia as well as remain focused on all disease directed treatments.  -Patient would not like to remain on mechanical life support or if there was not meaningful recovery. Therefore, states he would like to continue treatments with the limitations of his code status of DNR/DNI.    Support provided to patient and family.

## 2024-02-19 NOTE — ASSESSMENT & PLAN NOTE
AUTHORIZATION FOR SURGICAL OPERATION OR OTHER PROCEDURE    1.  I hereby authorize Dr. Rosemary Wren, and [address] [place] [address] staff assigned to my case to perform the following operation and/or procedure at the [address] [place] [address]:    ______________Aspiration Tigist Cervantes Extensive metastases noted on imaging, including bony mets.     Plan  Palliative care following, further recs appreciated  Tylenol 975 q8  Continue gabapentin 100 q.a.m., 200 BID  Continue dilaudid 8 mg PO q8  Dilaudid 6 mg PO q6 prn for moderate and severe pain  Dilaudid 0.5 mg IV q4 prn for breakthrough pain   ______________________________________________________  (please print)      Patient signature:  ___________________________________________________             Relationship to Patient:           []  Parent    Responsible person                          []

## 2024-02-19 NOTE — ASSESSMENT & PLAN NOTE
Lab Results   Component Value Date    SODIUM 134 (L) 02/19/2024    SODIUM 133 (L) 02/18/2024    SODIUM 134 (L) 02/18/2024     Stable and just below normal  Continue to monitor

## 2024-02-19 NOTE — ASSESSMENT & PLAN NOTE
Cancer related pain  Ongoing left shoulder pain exacerbated by movement but improved.  -Left thigh and hip pain improved with placement of Lidocaine patch at the left hip.   1) Discontinue PO Opioids and Gabapentin due to patient's escalating oxygen requirements with transfer to ICU as well as need for BiPAP now (inability to take PO meds).   2) Start IV Dilaudid 1 mg q6 hours ATC for pain   3) Start IV dilaudid 1 mg q3 hours PRN for moderate to severe pain    -the above with hold parameters for patient safety   4) Continue bowel regimen to avoid OIC    -patient had a bowel movement this morning since admission  5) Narcan on order for concerns for respiratory depression or need for opioid reversal

## 2024-02-19 NOTE — OCCUPATIONAL THERAPY NOTE
Occupational Therapy Cancellation Note     Patient Name: Abdulaziz Gomez  Today's Date: 2/19/2024 02/19/24 1100   Note Type   Note Type Cancelled Session   Cancel Reasons Medical status  (Per ICU mobility round, patient is not appropriate for OT treatment at this time. Pt is on continuous BI-PAP. Will hold and follow up as able and appropriate.)     Maureen Bruce MS OTR/L   [place] Licensure# [id number]

## 2024-02-19 NOTE — ASSESSMENT & PLAN NOTE
Malignant right pleural effusion secondary to underlying metastatic lung cancer  TPC placed 2/13/2024  Monitor and drain as indicated, currently every other day.

## 2024-02-19 NOTE — ASSESSMENT & PLAN NOTE
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

## 2024-02-19 NOTE — UTILIZATION REVIEW
Continued Stay Review    Date: 2/19/24                           Current Patient Class: inpatient   Current Level of Care: Level 1 Stepdown    HPI:63 y.o. male initially admitted on 2/7/24 inpatient:  acute respiratory failure with hypoxia/ Recently diagnosed with stage IV adenocarcinoma of the lung with diffuse bony metastasis/Malignant R Pleural effusion/cancer related pain/acute DVT.  TPC placed 2/13/24.    Chemo started 2/16/24.  IP radiation.     Assessment/Plan:   2/19/24:  Yesterday moved to stepdown due to increased work of breathing.  Overnight has required both HFNC and Bipap.   Today + fatigue, shortness of breath.  Severe left arm and hip weakness.   On exam: tachycardia.   Bilateral UE edema.  Left arm tenderness.   Appears ill and toxic.   Lungs rhonchi.  On Bipap.   Appears uncomfortable.   Plan:  check Cxr.  Wean oxygen as able.   Bipap as needed.  Diuresis as needed.   Monitor TPC and drain every other day.   Pain control.   Bowel regimen.  Possible Relistor.  Heparin gtt.       Vital Signs:   02/19/24 1200 -- 107 Abnormal  20 121/71 90 92 % -- -- -- -- -- -- --   02/19/24 1100 -- 108 Abnormal  23 Abnormal  133/87 103 95 % -- -- -- -- -- -- --   02/19/24 1047 -- -- -- 87/54 Abnormal  64 Abnormal  -- -- -- -- -- -- -- --   02/19/24 0900 -- 109 Abnormal  15 107/67 82 94 % -- -- -- -- -- -- --   02/19/24 0844 -- -- -- -- -- 93 % 100 -- -- -- BiPAP Full face mask --   02/19/24 0700 -- 109 Abnormal  17 106/66 -- 95 % -- -- -- -- -- -- --   02/19/24 0600 -- 120 Abnormal  25 Abnormal  123/72 90 84 % Abnormal  -- 80 -- 15 L/min Non-rebreather mask -- --   02/19/24 0500 -- 101 22 130/76 94 95 % -- -- -- -- -- -- --   02/19/24 0400 -- 98 19 130/67 93 96 % -- -- -- -- -- -- --   02/19/24 0300 -- 98 10 Abnormal  97/68 76 94 % -- -- -- -- -- Full face mask --   02/19/24 0200 -- 98 8 Abnormal  102/65 78 92 % -- -- -- -- -- -- --   02/19/24 0100 -- 97 -- 102/59 74 94 % -- -- -- -- -- -- --   02/19/24 0000 -- 96 --  105/56 74 94 % -- -- -- -- -- -- --   02/18/24 2301 -- 104 24 Abnormal  138/76 98 97 % -- -- -- -- -- Full face mask      Pertinent Labs/Diagnostic Results:   XR chest portable ICU   Final Result by Doreen Xavier MD (02/19 1223)      Similar appearance of reticular and airspace opacity at the right lung base. Cannot exclude right pleural effusion.      There is new airspace opacity containing air bronchograms at the medial left lung base.      Stable right paratracheal opacity.               Workstation performed: [id number]         XR chest portable   Final Result by Rose Grijalva MD (02/17 2057)      No acute disease with mild right base atelectasis/scar.      Right paratracheal opacity due to tumor.            Workstation performed: [id number]         XR chest portable   Final Result by Rose Grijalva MD (02/13 1149)      Insertion of right pleural drainage catheter with tip over the right lung base with drainage of right effusion.      Trace right apical pneumothorax.      Moderate right base atelectasis.            Workstation performed: [id number]         VAS upper limb venous duplex scan, complete, bilateral   Final Result by Daron Campuzano DO (02/12 1350)      XR shoulder 2+ vw left   Final Result by Mehdi Morocho MD (02/12 0837)      No acute osseous abnormality.         Workstation performed: [id number]         XR chest portable   Final Result by Dl Lutz MD (02/11 7718)      1.  Stable right basilar opacity likely representing a combination of atelectasis and pleural effusion.      2.  Mild colonic distention with right-sided stool.            Workstation performed: [id number]         MRI lumbar spine w wo contrast   Final Result by E. Alec Schoenberger, MD (02/10 0744)   Redemonstration of diffuse osseous metastatic disease. No compression of the conus or cauda equina.   Slight loss of height at L1 with mild epidural disease without significant canal stenosis.   Extraosseous  disease arising from the left sacral metastasis effacing the left lateral recess S1 and encroaching on the left S1 foramen as well as foraminal extension into the foramen at L5-S1   Tiny enhancing intradural extramedullary nodule along the conus at T12. Metastatic disease not excluded but this could also represent incidental nerve sheath tumor. Recommend follow-up and/or consider correlation with CSF.      Study marked in epic for notification.         Workstation performed: [id number]         MRI thoracic spine w wo contrast   Final Result by E. Alec Schoenberger, MD (02/10 0743)      Redemonstration of diffuse osseous metastatic disease. Pathologic compression fractures C7-T2. Subtle L1 compression. Posterior bowing of the cortex/mild epidural disease most pronounced at C7-T1 with mild canal stenosis. No cord compression.   2 mm enhancing intradural extramedullary nodule at T12. Metastasis not excluded but this could represent incidental nerve sheath tumor.      Study was marked in epic for notification.      Workstation performed: [id number]         MRI cervical spine w wo contrast   Final Result by E. Alec Schoenberger, MD (02/10 0740)      Redemonstration of diffuse osseous metastatic disease. Pathologic compression fractures with severe loss of height at C7 and mild to moderate compression at T1 and T2. Posterior bowing of the cortex/mild ventral epidural disease at C7-T1 causing mild    canal stenosis. No cord compression.   Degenerative spondylosis as above with mild canal stenosis and multilevel foraminal stenosis.   Superimposed extraosseous tumor into the left C6-7 foramen contributing to severe foraminal stenosis.      Study marked in epic for notification.      Workstation performed: [id number]         XR chest portable   Final Result by Doreen Xavier MD (02/08 1200)      Persistent moderate right pleural effusion. No pneumothorax.      Persistent airspace opacity at the right lung base.             Workstation performed: [id number]         CTA ED chest PE Study   Final Result by Rose Grijalva MD (02/07 1941)      No pulmonary embolus.      Persistent large right pleural effusion.      Worsening groundglass opacity in the right middle lobe, of uncertain etiology.      Redemonstration of tumor encasing the distal trachea and right bronchi extending into the anterior mediastinum and lower neck bilaterally encasing and narrowing of the brachiocephalic veins and SVC and occlusion of the right bronchi.      Mild bilateral axillary lymphadenopathy, possibly metastatic.      Liver metastases better shown on prior study.      Multiple bone metastases.            Workstation performed: [id number]            Results from last 7 days   Lab Units 02/19/24  1037 02/19/24  0532 02/18/24  0512 02/17/24  0425 02/16/24  0430 02/14/24  0437 02/13/24  0354 02/12/24  1909   WBC Thousand/uL 2.64* 2.93* 8.25 7.59 8.22 8.77 7.86 8.98   HEMOGLOBIN g/dL 8.6* 8.6* 8.6* 8.0* 8.6* 9.8* 9.8* 9.7*   HEMATOCRIT % 26.5* 26.9* 26.6* 25.0* 26.0* 29.8* 29.7* 30.0*   PLATELETS Thousands/uL 211 240 273 267 263 243 220 213   NEUTROS ABS Thousands/µL  --   --  7.55 6.79  --   --  5.22  --    BANDS PCT %  --  17*  --   --   --  3  --  2     Results from last 7 days   Lab Units 02/19/24  0532 02/18/24  1728 02/18/24  0512 02/17/24  0425 02/16/24  0430   SODIUM mmol/L 134* 133* 134* 134* 134*   POTASSIUM mmol/L 4.2 3.9 4.7 4.4 4.4   CHLORIDE mmol/L 90* 90* 93* 94* 94*   CO2 mmol/L 35* 33* 31 31 30   ANION GAP mmol/L 9 10 10 9 10   BUN mg/dL 32* 31* 27* 28* 24   CREATININE mg/dL 0.52* 0.60 0.54* 0.56* 0.52*   EGFR ml/min/1.73sq m 113 107 111 110 113   CALCIUM mg/dL 9.7 9.4 9.4 9.1 9.5   MAGNESIUM mg/dL 2.1  --   --   --   --    PHOSPHORUS mg/dL 4.4*  --   --   --   --      Results from last 7 days   Lab Units 02/19/24  0532   AST U/L 48*   ALT U/L 46   ALK PHOS U/L 104   TOTAL PROTEIN g/dL 6.2*   ALBUMIN g/dL 3.1*   TOTAL BILIRUBIN mg/dL 0.52      Results from last 7 days   Lab Units 02/19/24  0532 02/18/24  1728 02/18/24  0512 02/17/24  0425 02/16/24  0430 02/14/24  0437 02/13/24  0354   GLUCOSE RANDOM mg/dL 107 119 121 134 127 103 121     Results from last 7 days   Lab Units 02/18/24  1446   PH ART  7.471*   PCO2 ART mm Hg 43.1   PO2 ART mm Hg 57.8*   HCO3 ART mmol/L 30.7*   BASE EXC ART mmol/L 6.4   O2 CONTENT ART mL/dL 12.2*   O2 HGB, ARTERIAL % 88.5*   ABG SOURCE  Radial, Left     Results from last 7 days   Lab Units 02/19/24  1140 02/19/24  0200 02/18/24  1728 02/13/24  0354 02/12/24  1909   PROTIME seconds  --   --   --   --  15.2*   INR   --   --   --   --  1.13   PTT seconds 82* 179* >210*   < > 193*    < > = values in this interval not displayed.     Results from last 7 days   Lab Units 02/19/24  0532 02/18/24  1728   PROCALCITONIN ng/ml 2.09* 0.64*     Results from last 7 days   Lab Units 02/18/24  1728   LACTIC ACID mmol/L 2.0     Results from last 7 days   Lab Units 02/18/24  1730   BLOOD CULTURE  Received in Microbiology Lab. Culture in Progress.  Received in Microbiology Lab. Culture in Progress.         Medications:   Scheduled Medications:  acetaminophen, 975 mg, Oral, Q8H SHANTAL  chlorhexidine, 15 mL, Mouth/Throat, Q12H SHANTAL  cyanocobalamin, 1,000 mcg, Oral, Daily  fluticasone, 1 spray, Each Nare, Daily  folic acid, 1 mg, Oral, Daily  HYDROmorphone, 1 mg, Intravenous, Q6H  ipratropium, 0.5 mg, Nebulization, TID  levalbuterol, 1.25 mg, Nebulization, TID  lidocaine, 2 patch, Topical, Daily  menthol-methyl salicylate, , Apply externally, TID  methylPREDNISolone sodium succinate, 40 mg, Intravenous, Q12H SHANTAL  nicotine, 14 mg, Transdermal, Daily  polyethylene glycol, 17 g, Oral, Q12H  senna-docusate sodium, 2 tablet, Oral, BID  sodium chloride, 20 mL/hr, Intravenous, Once    furosemide (LASIX) injection 20 mg  Dose: 20 mg  Freq: Once Route: IV  Start: 02/19/24 0930 End: 02/19/24 1037   gabapentin (NEURONTIN) capsule 100 mg  Dose: 100 mg  Freq:  Every morning Route: PO  Indications of Use: NEUROPATHIC PAIN  gabapentin (NEURONTIN) capsule 200 mg  Dose: 200 mg  Freq: 2 times daily Route: PO  Indications of Use: NEUROPATHIC PAIN  Start: 02/16/24 1345 End: 02/19/24 1135 art: 02/17/24 0900 End: 02/19/24 1135  HYDROmorphone (DILAUDID) tablet 8 mg  Dose: 8 mg  Freq: Every 6 hours scheduled Route: PO  Start: 02/13/24 1200 End: 02/19/24 1135        Continuous IV Infusions:  heparin (porcine), 3-30 Units/kg/hr (Order-Specific), Intravenous, Titrated      PRN Meds:  albuterol, 2.5 mg, Nebulization, Q6H PRN  alteplase, 2 mg, Intracatheter, Q1MIN PRN  bisacodyl, 10 mg, Rectal, Daily PRN  heparin (porcine), 2,400 Units, Intravenous, Q6H PRN  heparin (porcine), 4,800 Units, Intravenous, Q6H PRN  HYDROmorphone, 1 mg, Intravenous, Q3H PRN x 1   naloxone, 0.04 mg, Intravenous, Q1MIN PRN  sodium chloride, 1 spray, Each Nare, Q1H PRN        Discharge Plan: to be determined     Network Utilization Review Department  ATTENTION: Please call with any questions or concerns to [phone] and carefully listen to the prompts so that you are directed to the right person. All voicemails are confidential.   For Discharge needs, contact Care Management DC Support Team at [phone] opt. 2  Send all requests for admission clinical reviews, approved or denied determinations and any other requests to dedicated fax number below belonging to the campus where the patient is receiving treatment. List of dedicated fax numbers for the Facilities:  FACILITY NAME UR FAX NUMBER   ADMISSION DENIALS (Administrative/Medical Necessity) [phone]   DISCHARGE SUPPORT TEAM (NETWORK) [phone]   PARENT CHILD HEALTH (Maternity/NICU/Pediatrics) [phone]   [facility] [phone]   [facility] [phone]   [facility] [phone]   [facility] [phone]   [facility]  [place] [phone]   [facility] [phone]   [facility] [phone]   [facility] [phone]   [facility] [phone]   [facility] [phone]   [facility] [phone]   [facility] [phone]

## 2024-02-19 NOTE — PHYSICAL THERAPY NOTE
Physical Therapy Cancellation Note       02/19/24 1029   Note Type   Note Type Cancelled Session   Cancel Reasons Medical status   Assessment   Assessment attempted to see pt for PT intervention but Kathe UMANZOR states pt is not appropriate due to respiratory status. will follow and continue PT as medically appropriate and schedule allows.     Cristobal Ponce, PT

## 2024-02-19 NOTE — ASSESSMENT & PLAN NOTE
Pt presented 2/8/2024  with increased WOB, likely due to stage IV lung adenocarcinoma, malignant pleural effusion, and emphysema.   Currently saturating mid-90s on MFNC   S/p pleurex catheter placement 2/13/2024  Most recent drainage preformed 2/18/2024    Plan  Repeat CXR on 2/19/2024  Diurese as needed, negative 505 mL in last 24h  Continue BiPAP, wean as tolerated

## 2024-02-19 NOTE — ASSESSMENT & PLAN NOTE
Last BM 6 days ago   Continue Miralax and Senokot daily, Doculax suppository prn  Consider Relistor administration today

## 2024-02-19 NOTE — ASSESSMENT & PLAN NOTE
Malnutrition Findings:   Adult Malnutrition type: Chronic illness  Adult Degree of Malnutrition: Other severe protein calorie malnutrition  Malnutrition Characteristics: Fat loss, Muscle loss, Inadequate energy, Weight loss     BMI 18.56 kg/m²  Continue to encourage caloric intake             360 Statement: Other severe protein calorie malnutrition related to catabolic illness evidenced by < 75% energy intake versus needs for > 1 months resulting in 17#(11.9%) wt loss over past 4 months since 10/26/23; exhibiting moderate clavicle region muscle loss, moderate buccal fat pads loss. Treating with Regular diet and Ensure supplements TID    BMI Findings:           Body mass index is 18.56 kg/m².

## 2024-02-19 NOTE — PROGRESS NOTES
[facility]  Progress Note  Name: Abdulaziz Gomez I  MRN: [id number]  Unit/Bed#: ICU 16 I Date of Admission: 2/7/2024   Date of Service: 2/19/2024 I Hospital Day: 12    Assessment/Plan   Palliative care patient  Assessment & Plan  Ongoing support provided with symptom monitoring.  -Palliative Social Work assisting in providing further support to patient and family.      Cancer related pain  Assessment & Plan  Cancer related pain  Ongoing left shoulder pain exacerbated by movement but improved.  -Left thigh and hip pain improved with placement of Lidocaine patch at the left hip.   1) Discontinue PO Opioids and Gabapentin due to patient's escalating oxygen requirements with transfer to ICU as well as need for BiPAP now (inability to take PO meds).   2) Start IV Dilaudid 1 mg q6 hours ATC for pain   3) Start IV dilaudid 1 mg q3 hours PRN for moderate to severe pain    -the above with hold parameters for patient safety   4) Continue bowel regimen to avoid OIC    -patient had a bowel movement this morning since admission  5) Narcan on order for concerns for respiratory depression or need for opioid reversal    Goals of care, counseling/discussion  Assessment & Plan  GOC discussion held with Pulm/Critical Care Team (Dr. Lo).  -Patient and family (wife and daughter both bedside).  -Discussed patient's understanding and opportunity for medical update.  -Dr. Lo discussed concerns for pneumonia and patient's escalating oxygen needs as well as what the potential options would be moving forward.   -In light of the concerns for patient's respiratory decline, Code Status was reviewed regarding Cardiac and Pulmonary Resuscitation    -Patient and family agree that they would like to pursue Level 3 - DNR/DNI code status at this time.    -Patient would like to proceed with treatments for Pneumonia as well as remain focused on all disease directed treatments.  -Patient would not like to  remain on mechanical life support or if there was not meaningful recovery. Therefore, states he would like to continue treatments with the limitations of his code status of DNR/DNI.    Support provided to patient and family.    Metastatic non-small cell lung cancer (HCC)  Assessment & Plan  Oncology following.  Started inpatient Chemotherapy.         Follow-up  We appreciate the opportunity to participate in this patient's care.   We will continue to follow. PSC to follow up.  Please do not hesitate to contact our on-call provider through our clinic answering service at [phone] should there be an acute change or other symptom control concerns.    Code status: Level 3 - DNAR and DNI   Decisional apparatus:  Patient does have capacity to make medical decisions on my exam today. If such capacity is lost, patient's substitute decision maker would default to patient's Health Care representative (Daughter - Soniya Cunningham) by [place] Act 169.   Advance Directive / Living Will / POLST:  Yes on file - [facility] ACP    We appreciate the opportunity to participate in this patient's care. We will continue to follow. Please do not hesitate to contact our on-call provider through our clinic answering service at [phone] should you have acute symptom control concerns.    Review of Systems   Constitutional:  Positive for fatigue.   Respiratory:  Positive for shortness of breath.    Cardiovascular:  Negative for chest pain.   Gastrointestinal:  Negative for abdominal pain, constipation, diarrhea, nausea and vomiting.   Genitourinary:  Negative for difficulty urinating and dysuria.   Musculoskeletal:  Positive for back pain (well-controlled). Negative for arthralgias and myalgias.   Psychiatric/Behavioral:  Positive for sleep disturbance. Negative for confusion.    All other systems reviewed and are negative.      MEDICATIONS / ALLERGIES:  all current active meds have been reviewed, current meds:   Current Facility-Administered  Medications   Medication Dose Route Frequency    acetaminophen (TYLENOL) tablet 975 mg  975 mg Oral Q8H SHANTAL    albuterol inhalation solution 2.5 mg  2.5 mg Nebulization Q6H PRN    alteplase (CATHFLO) injection 2 mg  2 mg Intracatheter Q1MIN PRN    bisacodyl (DULCOLAX) rectal suppository 10 mg  10 mg Rectal Daily PRN    cefTRIAXone (ROCEPHIN) 2,000 mg in dextrose 5 % 50 mL IVPB  2,000 mg Intravenous Q24H    chlorhexidine (PERIDEX) 0.12 % oral rinse 15 mL  15 mL Mouth/Throat Q12H SHANTAL    cyanocobalamin (VITAMIN B-12) tablet 1,000 mcg  1,000 mcg Oral Daily    fluticasone (FLONASE) 50 mcg/act nasal spray 1 spray  1 spray Each Nare Daily    folic acid (FOLVITE) tablet 1 mg  1 mg Oral Daily    heparin (porcine) 25,000 units in 0.45% NaCl 250 mL infusion (premix)  3-30 Units/kg/hr (Order-Specific) Intravenous Titrated    heparin (porcine) injection 2,400 Units  2,400 Units Intravenous Q6H PRN    heparin (porcine) injection 4,800 Units  4,800 Units Intravenous Q6H PRN    HYDROmorphone (DILAUDID) injection 1 mg  1 mg Intravenous Q6H    HYDROmorphone (DILAUDID) injection 1 mg  1 mg Intravenous Q3H PRN    ipratropium (ATROVENT) 0.02 % inhalation solution 0.5 mg  0.5 mg Nebulization TID    levalbuterol (XOPENEX) inhalation solution 1.25 mg  1.25 mg Nebulization TID    lidocaine (LIDODERM) 5 % patch 2 patch  2 patch Topical Daily    menthol-methyl salicylate (BENGAY) 10-15 % cream   Apply externally TID    methylPREDNISolone sodium succinate (Solu-MEDROL) injection 40 mg  40 mg Intravenous Q12H SHANTAL    methylPREDNISolone sodium succinate (Solu-MEDROL) injection 40 mg  40 mg Intravenous Daily    naloxone (NARCAN) 0.04 mg/mL syringe 0.04 mg  0.04 mg Intravenous Q1MIN PRN    nicotine (NICODERM CQ) 14 mg/24hr TD 24 hr patch 14 mg  14 mg Transdermal Daily    polyethylene glycol (MIRALAX) packet 17 g  17 g Oral Q12H    senna-docusate sodium (SENOKOT S) 8.6-50 mg per tablet 2 tablet  2 tablet Oral BID    sodium chloride (OCEAN) 0.65 %  "nasal spray 1 spray  1 spray Each Nare Q1H PRN    sodium chloride 0.9 % infusion  20 mL/hr Intravenous Once   , and PTA meds:   Prior to Admission Medications   Prescriptions Last Dose Informant Patient Reported? Taking?   HYDROmorphone HCl ER 8 MG TB24 2/7/2024  No Yes   Sig: Take 1 tablet by mouth in the morning Max Daily Amount: 1 tablet   albuterol (Proventil HFA) 90 mcg/act inhaler Not Taking Self No No   Sig: Inhale 2 puffs every 6 (six) hours as needed for wheezing   Patient not taking: Reported on 1/3/2024   oxyCODONE-acetaminophen (Percocet) 5-325 mg per tablet 2/6/2024  No Yes   Sig: Take 1 tablet by mouth every 6 (six) hours as needed for moderate pain Max Daily Amount: 4 tablets      Facility-Administered Medications: None       No Known Allergies    OBJECTIVE:  /71   Pulse (!) 107   Temp (!) 97.3 °F (36.3 °C) (Oral)   Resp 20   Ht 5' 9\" (1.753 m)   Wt 57 kg (125 lb 10.6 oz)   SpO2 91%   BMI 18.56 kg/m²   Nursing notes reviewed.  Physical Exam  Vitals and nursing note reviewed.   Constitutional:       General: He is not in acute distress.     Appearance: He is ill-appearing. He is not toxic-appearing or diaphoretic.      Interventions: He is not intubated.     Comments: Bipap mask on. Patient alert and oriented. Unable to speak but is able to write his thoughts on paper coherently. Thin, cachetic body habitus.   HENT:      Head: Normocephalic and atraumatic.   Eyes:      Conjunctiva/sclera: Conjunctivae normal.   Cardiovascular:      Rate and Rhythm: Tachycardia present.   Pulmonary:      Effort: No tachypnea, accessory muscle usage or respiratory distress. He is not intubated.      Comments: Requiring BiPAP due to his respiratory needs.  Abdominal:      Palpations: Abdomen is soft.      Tenderness: There is no abdominal tenderness.   Musculoskeletal:         General: No swelling.      Right lower leg: No edema.      Left lower leg: No edema.   Skin:     General: Skin is warm and dry.      " Coloration: Skin is not cyanotic.   Neurological:      General: No focal deficit present.      Mental Status: He is alert.   Psychiatric:         Mood and Affect: Mood normal. Mood is not anxious.         Behavior: Behavior normal. Behavior is not agitated.         Lab Results: I have personally reviewed pertinent labs.    HEMATOLOGY PROFILE:  Results from last 7 days   Lab Units 02/19/24  1037 02/19/24  0532 02/18/24  0512 02/17/24  0425 02/14/24  0437 02/13/24  0354   WBC Thousand/uL 2.64* 2.93* 8.25 7.59   < > 7.86   HEMOGLOBIN g/dL 8.6* 8.6* 8.6* 8.0*   < > 9.8*   HEMATOCRIT % 26.5* 26.9* 26.6* 25.0*   < > 29.7*   PLATELETS Thousands/uL 211 240 273 267   < > 220   NEUTROS PCT %  --   --  91* 89*  --  65   MONOS PCT %  --   --  2* 4  --  10   MONO PCT % 1* 0*  --   --    < >  --    EOS PCT % 0 3 1 0   < > 2    < > = values in this interval not displayed.       CHEMISTRY PROFILE:  Results from last 7 days   Lab Units 02/19/24  0532 02/18/24  1728 02/18/24  0512   SODIUM mmol/L 134* 133* 134*   POTASSIUM mmol/L 4.2 3.9 4.7   CHLORIDE mmol/L 90* 90* 93*   CO2 mmol/L 35* 33* 31   BUN mg/dL 32* 31* 27*   CREATININE mg/dL 0.52* 0.60 0.54*   ALBUMIN g/dL 3.1*  --   --    CALCIUM mg/dL 9.7 9.4 9.4   ALK PHOS U/L 104  --   --    ALT U/L 46  --   --    AST U/L 48*  --   --        Albumin:  0   Lab Value Date/Time    ALB 3.1 (L) 02/19/2024 0532     Imaging Studies: I have personally reviewed pertinent reports.  EKG, Pathology, and Other Studies: I have personally reviewed pertinent reports.    Counseling / Coordination of Care:  Total floor / unit time spent today 70 minutes. Greater than 50% of total time was spent with the patient and / or family counseling and / or coordination of care. A description of the counseling / coordination of care: symptom assessment and management, medication review, medication adjustment, psychosocial support, chart review, imaging review, lab review, goals of care, opioid titration,  "supportive listening, and anticipatory guidance.    Jb Maynard DO  [facility]  [phone]    Portions of this document may have been created using dictation software and as such some \"sound alike\" terms may have been generated by the system. Do not hesitate to contact me with any questions or clarifications.      "

## 2024-02-19 NOTE — PROGRESS NOTES
[facility]  Progress Note  Name: Abdulaziz Gomez I  MRN: [id number]  Unit/Bed#: ICU 16 I Date of Admission: 2/7/2024   Date of Service: 2/19/2024 I Hospital Day: 12    Assessment/Plan   * Acute respiratory failure with hypoxia (HCC)  Assessment & Plan  Pt presented 2/8/2024  with increased WOB, likely due to stage IV lung adenocarcinoma, malignant pleural effusion, and emphysema.   Currently saturating mid-90s on MFNC   S/p pleurex catheter placement 2/13/2024  Most recent drainage preformed 2/18/2024    Plan  Repeat CXR on 2/19/2024  Diurese as needed, negative 505 mL in last 24h  Continue BiPAP, wean as tolerated    Metastatic non-small cell lung cancer (HCC)  Assessment & Plan  Recently diagnosed with stage IV adenocarcinoma of the lung with diffuse bony metastasis  Secondary to smoking history  Radiation oncology on board started on inpatient radiation with plan of total 10 sessions, can be continued outpatient as well  Oncology following and chemotherapy  started 2/16/2024  Further plan per radiation oncology and medical oncology.     Malignant pleural effusion  Assessment & Plan  Malignant right pleural effusion secondary to underlying metastatic lung cancer  TPC placed 2/13/2024  Monitor and drain as indicated, currently every other day.    Cancer related pain  Assessment & Plan  Extensive metastases noted on imaging, including bony mets.     Plan  Palliative care following, further recs appreciated  Tylenol 975 q8  Continue gabapentin 100 q.a.m., 200 BID  Continue dilaudid 8 mg PO q8  Dilaudid 6 mg PO q6 prn for moderate and severe pain  Dilaudid 0.5 mg IV q4 prn for breakthrough pain    Constipation  Assessment & Plan  Last BM 6 days ago   Continue Miralax and Senokot daily, Doculax suppository prn  Consider Relistor administration today    Acute deep vein thrombosis (DVT) of both upper extremities (HCC)  Assessment & Plan  Acute nonocclusive DVT in right subclavian vein and left  IJ      Plan  Continue DVT heparin infusion, currently day 8  Consider transition to eliquis for further anticoagulation     Left shoulder pain  Assessment & Plan  Secondary to metastatic disease  Continue aggressive pain control utilizing gabapentin, Dilaudid, topical Bengay, acetaminophen  Breakthrough regimen ordered  Palliative care consulted-appreciate recs    Severe protein-calorie malnutrition (HCC)  Assessment & Plan  Malnutrition Findings:   Adult Malnutrition type: Chronic illness  Adult Degree of Malnutrition: Other severe protein calorie malnutrition  Malnutrition Characteristics: Fat loss, Muscle loss, Inadequate energy, Weight loss     BMI 18.56 kg/m²  Continue to encourage caloric intake             360 Statement: Other severe protein calorie malnutrition related to catabolic illness evidenced by < 75% energy intake versus needs for > 1 months resulting in 17#(11.9%) wt loss over past 4 months since 10/26/23; exhibiting moderate clavicle region muscle loss, moderate buccal fat pads loss. Treating with Regular diet and Ensure supplements TID    BMI Findings:           Body mass index is 18.56 kg/m².       COPD (chronic obstructive pulmonary disease) ([facility])  Assessment & Plan  Exam without evidence of wheezing  Ipratropium scheduled ordered  As needed albuterol ordered  Continue to monitor pulse ox and exam for wheezing    Hyponatremia  Assessment & Plan  Lab Results   Component Value Date    SODIUM 134 (L) 02/19/2024    SODIUM 133 (L) 02/18/2024    SODIUM 134 (L) 02/18/2024     Stable and just below normal  Continue to monitor     Palliative care patient  Assessment & Plan  Continue pain control per palliative  Need GOC discussion with patient, family    Current every day smoker  Assessment & Plan  Continue 14 mg nicotine replacement patch             Disposition: Stepdown Level 1    ICU Core Measures     A: Assess, Prevent, and Manage Pain Has pain been assessed? Yes  Need for changes to pain regimen?  Yes   B: Both SAT/SAT  N/A   C: Choice of Sedation RASS Goal: 0 Alert and Calm  Need for changes to sedation or analgesia regimen? Yes   D: Delirium CAM-ICU: Negative   E: Early Mobility  Plan for early mobility? No   F: Family Engagement Plan for family engagement today? Yes         Prophylaxis:  VTE VTE covered by:  heparin (porcine), Intravenous, 17 Units/kg/hr at 02/19/24 0408  heparin (porcine), Intravenous, 2,400 Units at 02/18/24 0703  heparin (porcine), Intravenous, 4,800 Units at 02/18/24 1532       Stress Ulcer  not ordered         Significant 24hr Events     24hr events: Pt upgraded from medsurg to SD1 yesterday due to increased WOB and O2 requirements. Overnight has required either HFNC or BiPAP to maintain O2 saturations. First BM since admission this morning.      Subjective   Review of Systems   Constitutional:  Positive for fatigue.   Respiratory:  Positive for shortness of breath.    Cardiovascular:  Negative for chest pain.   Gastrointestinal:  Negative for abdominal pain, constipation and nausea.   Musculoskeletal:  Positive for arthralgias.        Severe L arm pain, L hip pain   Neurological:  Positive for weakness (generalized).      Objective                            Vitals I/O      Most Recent Min/Max in 24hrs   Temp (!) 97.3 °F (36.3 °C) Temp  Min: 97.3 °F (36.3 °C)  Max: 98 °F (36.7 °C)   Pulse (!) 109 Pulse  Min: 96  Max: 120   Resp 17 Resp  Min: 8  Max: 26   /66 BP  Min: 97/68  Max: 162/81   O2 Sat 95 % SpO2  Min: 83 %  Max: 97 %      Intake/Output Summary (Last 24 hours) at 2/19/2024 0826  Last data filed at 2/19/2024 0600  Gross per 24 hour   Intake 1359.83 ml   Output 1865 ml   Net -505.17 ml       Diet Regular; Regular House    Invasive Monitoring           Physical Exam   Physical Exam  Eyes:      Extraocular Movements: Extraocular movements intact.   Skin:     General: Skin is warm and dry.   HENT:      Head: Normocephalic and atraumatic.   Cardiovascular:      Rate and  Rhythm: Tachycardia present.   Musculoskeletal:      Comments: B/l upper extremity edema  L arm tenderness   Abdominal:      Palpations: Abdomen is soft.      Tenderness: There is no abdominal tenderness.   Constitutional:       General: He is in acute distress.      Appearance: He is ill-appearing and toxic-appearing.   Pulmonary:      Breath sounds: Rhonchi present.      Comments: Requiring BiPAP  Neurological:      Mental Status: He is alert.      Comments: uncomfortable            Diagnostic Studies      EKG: none new  Imaging:   XR chest portable   Final Result by Rose Grijalva MD (02/17 2057)      No acute disease with mild right base atelectasis/scar.      Right paratracheal opacity due to tumor.            Workstation performed: [id number]         XR chest portable   Final Result by Rose Grijalva MD (02/13 1149)      Insertion of right pleural drainage catheter with tip over the right lung base with drainage of right effusion.      Trace right apical pneumothorax.      Moderate right base atelectasis.            Workstation performed: [id number]         VAS upper limb venous duplex scan, complete, bilateral   Final Result by Daron Campuzano DO (02/12 1350)      XR shoulder 2+ vw left   Final Result by Mehdi Morocho MD (02/12 0837)      No acute osseous abnormality.         Workstation performed: [id number]         XR chest portable   Final Result by Dl Lutz MD (02/11 1428)      1.  Stable right basilar opacity likely representing a combination of atelectasis and pleural effusion.      2.  Mild colonic distention with right-sided stool.            Workstation performed: [id number]         MRI lumbar spine w wo contrast   Final Result by E. Alec Schoenberger, MD (02/10 7911)   Redemonstration of diffuse osseous metastatic disease. No compression of the conus or cauda equina.   Slight loss of height at L1 with mild epidural disease without significant canal stenosis.   Extraosseous  disease arising from the left sacral metastasis effacing the left lateral recess S1 and encroaching on the left S1 foramen as well as foraminal extension into the foramen at L5-S1   Tiny enhancing intradural extramedullary nodule along the conus at T12. Metastatic disease not excluded but this could also represent incidental nerve sheath tumor. Recommend follow-up and/or consider correlation with CSF.      Study marked in epic for notification.         Workstation performed: [id number]         MRI thoracic spine w wo contrast   Final Result by E. Alec Schoenberger, MD (02/10 0743)      Redemonstration of diffuse osseous metastatic disease. Pathologic compression fractures C7-T2. Subtle L1 compression. Posterior bowing of the cortex/mild epidural disease most pronounced at C7-T1 with mild canal stenosis. No cord compression.   2 mm enhancing intradural extramedullary nodule at T12. Metastasis not excluded but this could represent incidental nerve sheath tumor.      Study was marked in epic for notification.      Workstation performed: [id number]         MRI cervical spine w wo contrast   Final Result by E. Alec Schoenberger, MD (02/10 0740)      Redemonstration of diffuse osseous metastatic disease. Pathologic compression fractures with severe loss of height at C7 and mild to moderate compression at T1 and T2. Posterior bowing of the cortex/mild ventral epidural disease at C7-T1 causing mild    canal stenosis. No cord compression.   Degenerative spondylosis as above with mild canal stenosis and multilevel foraminal stenosis.   Superimposed extraosseous tumor into the left C6-7 foramen contributing to severe foraminal stenosis.      Study marked in epic for notification.      Workstation performed: [id number]         XR chest portable   Final Result by Doreen Xavier MD (02/08 1200)      Persistent moderate right pleural effusion. No pneumothorax.      Persistent airspace opacity at the right lung base.             Workstation performed: [id number]         CTA ED chest PE Study   Final Result by Rose Grijalva MD (02/07 1941)      No pulmonary embolus.      Persistent large right pleural effusion.      Worsening groundglass opacity in the right middle lobe, of uncertain etiology.      Redemonstration of tumor encasing the distal trachea and right bronchi extending into the anterior mediastinum and lower neck bilaterally encasing and narrowing of the brachiocephalic veins and SVC and occlusion of the right bronchi.      Mild bilateral axillary lymphadenopathy, possibly metastatic.      Liver metastases better shown on prior study.      Multiple bone metastases.            Workstation performed: [id number]         XR chest portable ICU    (Results Pending)      I have personally reviewed pertinent reports.       Medications:  Scheduled PRN   acetaminophen, 975 mg, Q8H SHANTAL  cyanocobalamin, 1,000 mcg, Daily  fluticasone, 1 spray, Daily  folic acid, 1 mg, Daily  gabapentin, 100 mg, QAM  gabapentin, 200 mg, BID  HYDROmorphone, 8 mg, Q6H SHANTAL  ipratropium, 0.5 mg, TID  levalbuterol, 1.25 mg, TID  lidocaine, 2 patch, Daily  lidocaine-epinephrine, 20 mL, Once  menthol-methyl salicylate, , TID  methylPREDNISolone sodium succinate, 40 mg, Q12H SHANTAL  nicotine, 14 mg, Daily  polyethylene glycol, 17 g, Q12H  senna-docusate sodium, 2 tablet, BID  sodium chloride, 20 mL/hr, Once      albuterol, 2.5 mg, Q6H PRN  alteplase, 2 mg, Q1MIN PRN  bisacodyl, 10 mg, Daily PRN  heparin (porcine), 2,400 Units, Q6H PRN  heparin (porcine), 4,800 Units, Q6H PRN  HYDROmorphone, 0.5 mg, Q4H PRN  HYDROmorphone, 6 mg, Q4H PRN  naloxone, 0.04 mg, Q1MIN PRN  sodium chloride, 1 spray, Q1H PRN       Continuous    heparin (porcine), 3-30 Units/kg/hr (Order-Specific), Last Rate: 17 Units/kg/hr (02/19/24 0408)         Labs:    CBC    Recent Labs     02/18/24  0512 02/19/24  0532   WBC 8.25 2.93*   HGB 8.6* 8.6*   HCT 26.6* 26.9*    240     BMP    Recent  Labs     02/18/24  1728 02/19/24  0532   SODIUM 133* 134*   K 3.9 4.2   CL 90* 90*   CO2 33* 35*   AGAP 10 9   BUN 31* 32*   CREATININE 0.60 0.52*   CALCIUM 9.4 9.7       Coags    Recent Labs     02/18/24  1728 02/19/24  0200   PTT >210* 179*        Additional Electrolytes  Recent Labs     02/19/24  0532   MG 2.1   PHOS 4.4*          Blood Gas    Recent Labs     02/18/24  1446   PHART 7.471*   [id number] 43.1   PO2ART 57.8*   [id number] 30.7*   BEART 6.4   SOURCE Radial, Left     Recent Labs     02/18/24  1446   SOURCE Radial, Left    LFTs  Recent Labs     02/19/24  0532   ALT 46   AST 48*   ALKPHOS 104   ALB 3.1*   TBILI 0.52       Infectious  Recent Labs     02/18/24  1728 02/19/24  0532   PROCALCITONI 0.64* 2.09*     Glucose  Recent Labs     02/18/24  0512 02/18/24  1728 02/19/24  0532   GLUC 121 119 107             Philip Eden, DO

## 2024-02-20 NOTE — PLAN OF CARE
Amrik Ellis  02/19/24    0582  Patient family members at bedside, requesting to speak with provider regarding patient's GOC and desire to transition to comfort measure. CCAP aware and at bedside.       Problem: RESPIRATORY - ADULT  Goal: Achieves optimal ventilation and oxygenation  Description: INTERVENTIONS:  - Assess for changes in respiratory status  - Assess for changes in mentation and behavior  - Position to facilitate oxygenation and minimize respiratory effort  - Oxygen administered by appropriate delivery if ordered  - Initiate smoking cessation education as indicated  - Encourage broncho-pulmonary hygiene including cough, deep breathe, Incentive Spirometry  - Assess the need for suctioning and aspirate as needed  - Assess and instruct to report SOB or any respiratory difficulty  - Respiratory Therapy support as indicated  Outcome: Progressing     Problem: Nutrition/Hydration-ADULT  Goal: Nutrient/Hydration intake appropriate for improving, restoring or maintaining nutritional needs  Description: Monitor and assess patient's nutrition/hydration status for malnutrition. Collaborate with interdisciplinary team and initiate plan and interventions as ordered.  Monitor patient's weight and dietary intake as ordered or per policy. Utilize nutrition screening tool and intervene as necessary. Determine patient's food preferences and provide high-protein, high-caloric foods as appropriate.     INTERVENTIONS:  - Monitor oral intake, urinary output, labs, and treatment plans  - Assess nutrition and hydration status and recommend course of action  - Evaluate amount of meals eaten  - Assist patient with eating if necessary   - Allow adequate time for meals  - Recommend/ encourage appropriate diets, oral nutritional supplements, and vitamin/mineral supplements  - Order, calculate, and assess calorie counts as needed  - Recommend, monitor, and adjust tube feedings and TPN/PPN based on assessed needs  - Assess need for  intravenous fluids  - Provide specific nutrition/hydration education as appropriate  - Include patient/family/caregiver in decisions related to nutrition  Outcome: Progressing     Problem: Knowledge Deficit  Goal: Patient/family/caregiver demonstrates understanding of disease process, treatment plan, medications, and discharge instructions  Description: Complete learning assessment and assess knowledge base.  Interventions:  - Provide teaching at level of understanding  - Provide teaching via preferred learning methods  Outcome: Progressing     Problem: DISCHARGE PLANNING  Goal: Discharge to home or other facility with appropriate resources  Description: INTERVENTIONS:  - Identify barriers to discharge w/patient and caregiver  - Arrange for needed discharge resources and transportation as appropriate  - Identify discharge learning needs (meds, wound care, etc.)  - Arrange for interpretive services to assist at discharge as needed  - Refer to Case Management Department for coordinating discharge planning if the patient needs post-hospital services based on physician/advanced practitioner order or complex needs related to functional status, cognitive ability, or social support system  Outcome: Progressing

## 2024-02-20 NOTE — DISCHARGE SUMMARY
Discharge Summary - Abdulaziz Gomez 63 y.o. male MRN: [id number]    Unit/Bed#: ICU 16 Encounter: [id number] PCP: Genesis Leonard MD    Admission Date:   Admission Orders (From admission, onward)       Ordered        02/07/24 2013  INPATIENT ADMISSION  Once                            Admitting Diagnosis: Pleural effusion [J90]  Hypoxia [R09.02]  Metastatic disease (HCC) [C79.9]  Intractable pain [R52]    HPI: per H&P  63 y.o. male with a PMH of metastatic non-small cell lung cancer and daily cigarette smoker who presents after referral from outpatient palliative care provider due to complaints of worsening shortness of breath and worsening pain associated with recent diagnosis of lung cancer with metastases. Patient endorses back pain with radiation to left lower extremity. Patient reports shortness of breath is worse with movement. He endorses wheezing. He reports that he formerly smoked 1 PPD, he states he has cut down to 3-4 cigarettes per day.     Procedures Performed:   Orders Placed This Encounter   Procedures    ED ECG Documentation Only    General procedure       Summary of Hospital Course: Patient with stage IV non-small cell lung cancer with bone and liver mets was originally admitted to the [facility] floor with hypoxic respiratory failure.  On 2/18 his hypoxia worsened and he was transferred to the ICU for stepdown admission.  Imaging was suggestive of a developing right basilar infiltrate.  He also had known bilateral DVTs, right malignant effusion that was last drained on 2/17 and severe chronic pain related to his bony mets.  Patient had been on and off BiPAP and HFNC.  Patient was being treated aggressively with steroids and antibiotics.  Tonight patient said he did not want to live like this anymore and decision was made to progress to comfort care.  Patient was given Ativan and Dilaudid and placed on Dilaudid drip.  He was removed from the BiPAP and wife and daughter at bedside.  Patient remained  comfortable and  at 0025.    Significant Findings, Care, Treatment and Services Provided: None    Complications: None    Disposition:      Final Diagnosis: Hypoxic Respiratory Failure, Metastatic Lung CA    Medical Problems       Resolved Problems  Date Reviewed: 2024   None         Condition at Time of Death:     Date, Time and Cause of Death    Date of Death: 24  Time of Death: 12:25 AM  Preliminary Cause of Death: Hypoxic respiratory failure (HCC)  Entered by: Doc HERNANDEZ[DT1.1]       Attribution       DT1.1 MARY Goodwin 24 00:45            Death Note:    INPATIENT DEATH NOTE  Abdulaziz Gomez 63 y.o. male MRN: [id number]  Unit/Bed#: ICU 16 Encounter: [id number]    Date, Time and Cause of Death    Date of Death: 24  Time of Death: 12:25 AM  Preliminary Cause of Death: Hypoxic respiratory failure (HCC)  Entered by: Doc HERNANDEZ[DT1.1]       Attribution       DT1.1 MARY Goodwin 24 00:45             Patient's Information  Pronounced by: Doc HERNANDEZ  Did the patient's death occur in the ED?: No  Did the patient's death occur in the OR?: No  Did the patient's death occur less than 10 days post-op?: No  Did the patient's death occur within 24 hours of admission?: No  Was code status DNR at the time of death?: Yes    PHYSICAL EXAM:  Unresponsive to noxious stimuli, Spontaneous respirations absent, Breath sounds absent, Carotid pulse absent, Heart sounds absent, Pupillary light reflex absent, and Corneal blink reflex absent    Medical Examiner notification criteria:  NONE APPLICABLE   Medical Examiner's office notified?:  Yes   Medical Examiner accepted case?:  No    Family Notification  Was the family notified?: Yes  Date Notified: 24  Time Notified: 002  Notified by: Doc HERNANDEZ  Name of Family Notified of Death: Niall   Relationship to Patient: Spouse, Daughter  Family Notification  Route: At bedside  Was the family told to contact a  home?: Yes  Name of  Home:: [place]    Autopsy Options:  Post-mortem examination declined by next of kin    Primary Service Attending Physician notified?:  yes - Attending:  Dominic Lo,*    Physician/Resident responsible for completing Discharge Summary:  Doc HERNANDEZ

## 2024-02-20 NOTE — ACP (ADVANCE CARE PLANNING)
Called to bedside by wife Malgorzata and daughter Soniya. Patient is tired of not being able to breath and wishes to proceed to comfort care. Wife and daughter in agreement and supportive. Will place orders and once seems comfortable will remove bipap.

## 2024-02-20 NOTE — UTILIZATION REVIEW
NOTIFICATION OF ADMISSION DISCHARGE   This is a Notification of Discharge from [facility]. Please be advised that this patient has been discharge from our facility. Below you will find the admission and discharge date and time including the patient’s disposition.   UTILIZATION REVIEW CONTACT:  Juanjose Jang  Utilization   Network Utilization Review Department  Phone: [phone] x carefully listen to the prompts. All voicemails are confidential.  Email: [email or web address]     ADMISSION INFORMATION  PRESENTATION DATE: 2024  5:15 PM  OBERVATION ADMISSION DATE:   INPATIENT ADMISSION DATE: 24  8:14 PM   DISCHARGE DATE: 2024  2:33 AM   DISPOSITION:    Network Utilization Review Department  ATTENTION: Please call with any questions or concerns to [phone] and carefully listen to the prompts so that you are directed to the right person. All voicemails are confidential.   For Discharge needs, contact Care Management DC Support Team at [phone] opt. 2  Send all requests for admission clinical reviews, approved or denied determinations and any other requests to dedicated fax number below belonging to the campus where the patient is receiving treatment. List of dedicated fax numbers for the Facilities:  FACILITY NAME UR FAX NUMBER   ADMISSION DENIALS (Administrative/Medical Necessity) [phone]   DISCHARGE SUPPORT TEAM ([facility]) [phone]   PARENT CHILD HEALTH (Maternity/NICU/Pediatrics) [phone]   [facility] [phone]   [facility] [phone]   [facility] [phone]   [facility] [phone]   [facility] [phone]   [facility] [phone]   [facility] [phone]   [facility] [phone]   [facility]  [facility] [phone]   [facility] [phone]   [facility] [phone]   [facility] [phone]

## 2024-02-20 NOTE — TELEPHONE ENCOUNTER
Per Celi at patient's PCP office, the [facility] papers that were faxed to our office on 2/19/2024 can be shredded.  Patient has passed away.

## 2024-02-20 NOTE — DEATH NOTE
INPATIENT DEATH NOTE  Abdulaziz Gomez 63 y.o. male MRN: [id number]  Unit/Bed#: ICU 16 Encounter: [id number]    Date, Time and Cause of Death    Date of Death: 24  Time of Death: 12:25 AM  Preliminary Cause of Death: Hypoxic respiratory failure (HCC)  Entered by: Doc HERNANDEZ[DT1.1]       Attribution       DT1.1 MARY Goodwin 24 00:45             Patient's Information  Pronounced by: Doc HERNANDEZ  Did the patient's death occur in the ED?: No  Did the patient's death occur in the OR?: No  Did the patient's death occur less than 10 days post-op?: No  Did the patient's death occur within 24 hours of admission?: No  Was code status DNR at the time of death?: Yes    PHYSICAL EXAM:  Unresponsive to noxious stimuli, Spontaneous respirations absent, Breath sounds absent, Carotid pulse absent, Heart sounds absent, Pupillary light reflex absent, and Corneal blink reflex absent    Medical Examiner notification criteria:  NONE APPLICABLE   Medical Examiner's office notified?:  Yes   Medical Examiner accepted case?:  No    Family Notification  Was the family notified?: Yes  Date Notified: 24  Time Notified: 0025  Notified by: Doc HERNANDEZ  Name of Family Notified of Death: Niall   Relationship to Patient: Spouse, Daughter  Family Notification Route: At bedside  Was the family told to contact a  home?: Yes  Name of  Home:: [facility]    Autopsy Options:  Post-mortem examination declined by next of kin    Primary Service Attending Physician notified?:  yes - Attending:  Dominic Lo,*    Physician/Resident responsible for completing Discharge Summary:  Doc HERNANDEZ

## 2024-02-21 LAB
DME PARACHUTE DELIVERY DATE ACTUAL: NORMAL
DME PARACHUTE DELIVERY DATE REQUESTED: NORMAL
DME PARACHUTE ITEM DESCRIPTION: NORMAL
DME PARACHUTE ITEM DESCRIPTION: NORMAL
DME PARACHUTE ORDER STATUS: NORMAL
DME PARACHUTE SUPPLIER NAME: NORMAL
DME PARACHUTE SUPPLIER PHONE: NORMAL

## 2024-02-23 LAB
BACTERIA BLD CULT: NORMAL
BACTERIA BLD CULT: NORMAL

## 2024-11-22 NOTE — ASSESSMENT & PLAN NOTE
No documented PFTs on file  Continue respiratory treatments  Pulm inputs noted  Supportive cares   Attending Attestation (For Attendings USE Only)...